# Patient Record
Sex: FEMALE | Race: WHITE | NOT HISPANIC OR LATINO | Employment: OTHER | URBAN - NONMETROPOLITAN AREA
[De-identification: names, ages, dates, MRNs, and addresses within clinical notes are randomized per-mention and may not be internally consistent; named-entity substitution may affect disease eponyms.]

---

## 2017-01-04 DIAGNOSIS — J44.1 COPD EXACERBATION (H): Primary | ICD-10-CM

## 2017-01-04 RX ORDER — PREDNISONE 10 MG/1
TABLET ORAL
Qty: 18 TABLET | Refills: 0 | Status: SHIPPED | OUTPATIENT
Start: 2017-01-04 | End: 2017-01-19

## 2017-01-08 DIAGNOSIS — I25.810 CORONARY ARTERY DISEASE INVOLVING CORONARY BYPASS GRAFT OF NATIVE HEART WITHOUT ANGINA PECTORIS: Primary | ICD-10-CM

## 2017-01-10 RX ORDER — CLOPIDOGREL BISULFATE 75 MG/1
TABLET ORAL
Qty: 90 TABLET | Refills: 0 | Status: SHIPPED | OUTPATIENT
Start: 2017-01-10 | End: 2017-04-04

## 2017-01-10 NOTE — TELEPHONE ENCOUNTER
Last visit: 9.22.15 - Overdue on follow up. Please advise on refills. Thank you  Last refill: 10.12.16 #90

## 2017-01-19 DIAGNOSIS — J44.1 COPD EXACERBATION (H): Primary | ICD-10-CM

## 2017-01-23 RX ORDER — PREDNISONE 10 MG/1
TABLET ORAL
Qty: 18 TABLET | Refills: 0 | Status: SHIPPED | OUTPATIENT
Start: 2017-01-23 | End: 2017-02-12

## 2017-01-25 ENCOUNTER — OFFICE VISIT (OUTPATIENT)
Dept: FAMILY MEDICINE | Facility: OTHER | Age: 72
End: 2017-01-25
Attending: FAMILY MEDICINE
Payer: COMMERCIAL

## 2017-01-25 VITALS
TEMPERATURE: 99.2 F | WEIGHT: 123.6 LBS | HEIGHT: 62 IN | HEART RATE: 93 BPM | OXYGEN SATURATION: 90 % | RESPIRATION RATE: 18 BRPM | SYSTOLIC BLOOD PRESSURE: 160 MMHG | DIASTOLIC BLOOD PRESSURE: 60 MMHG | BODY MASS INDEX: 22.74 KG/M2

## 2017-01-25 DIAGNOSIS — J44.0 CHRONIC OBSTRUCTIVE PULMONARY DISEASE WITH ACUTE LOWER RESPIRATORY INFECTION (H): ICD-10-CM

## 2017-01-25 DIAGNOSIS — J43.1 PANLOBULAR EMPHYSEMA (H): ICD-10-CM

## 2017-01-25 DIAGNOSIS — R05.9 COUGH: Primary | ICD-10-CM

## 2017-01-25 PROBLEM — Z71.89 ACP (ADVANCE CARE PLANNING): Chronic | Status: ACTIVE | Noted: 2017-01-25

## 2017-01-25 PROCEDURE — 99214 OFFICE O/P EST MOD 30 MIN: CPT | Performed by: FAMILY MEDICINE

## 2017-01-25 PROCEDURE — 71020 ZZHC CHEST TWO VIEWS, FRONT/LAT: CPT | Mod: TC | Performed by: RADIOLOGY

## 2017-01-25 RX ORDER — IPRATROPIUM BROMIDE AND ALBUTEROL SULFATE 2.5; .5 MG/3ML; MG/3ML
1 SOLUTION RESPIRATORY (INHALATION) EVERY 6 HOURS PRN
Qty: 90 VIAL | Refills: 3 | Status: SHIPPED | OUTPATIENT
Start: 2017-01-25 | End: 2017-06-07

## 2017-01-25 ASSESSMENT — ANXIETY QUESTIONNAIRES
GAD7 TOTAL SCORE: 0
3. WORRYING TOO MUCH ABOUT DIFFERENT THINGS: NOT AT ALL
IF YOU CHECKED OFF ANY PROBLEMS ON THIS QUESTIONNAIRE, HOW DIFFICULT HAVE THESE PROBLEMS MADE IT FOR YOU TO DO YOUR WORK, TAKE CARE OF THINGS AT HOME, OR GET ALONG WITH OTHER PEOPLE: NOT DIFFICULT AT ALL
7. FEELING AFRAID AS IF SOMETHING AWFUL MIGHT HAPPEN: NOT AT ALL
5. BEING SO RESTLESS THAT IT IS HARD TO SIT STILL: NOT AT ALL
1. FEELING NERVOUS, ANXIOUS, OR ON EDGE: NOT AT ALL
6. BECOMING EASILY ANNOYED OR IRRITABLE: NOT AT ALL
2. NOT BEING ABLE TO STOP OR CONTROL WORRYING: NOT AT ALL

## 2017-01-25 ASSESSMENT — PAIN SCALES - GENERAL: PAINLEVEL: NO PAIN (0)

## 2017-01-25 ASSESSMENT — PATIENT HEALTH QUESTIONNAIRE - PHQ9: 5. POOR APPETITE OR OVEREATING: NOT AT ALL

## 2017-01-25 NOTE — MR AVS SNAPSHOT
"              After Visit Summary   1/25/2017    Sheila Nuñez    MRN: [id number]           Patient Information     Date Of Birth          1945        Visit Information        Provider Department      1/25/2017 3:20 PM David [facility]        Today's Diagnoses     Cough    -  1     Chronic obstructive pulmonary disease with acute lower respiratory infection (H)         Panlobular emphysema (H)           Care Instructions    Take amoxicillin-clavulinic acid (Augmentin) twice daily        Follow-ups after your visit        Follow-up notes from your care team     Return in about 2 weeks (around 2/8/2017) for Follow Up Visit.      Who to contact     If you have questions or need follow up information about today's clinic visit or your schedule please contact [facility] directly at [phone].  Normal or non-critical lab and imaging results will be communicated to you by MyChart, letter or phone within 4 business days after the clinic has received the results. If you do not hear from us within 7 days, please contact the clinic through MyChart or phone. If you have a critical or abnormal lab result, we will notify you by phone as soon as possible.  Submit refill requests through [employer] or call your pharmacy and they will forward the refill request to us. Please allow 3 business days for your refill to be completed.          Additional Information About Your Visit        [employer] Information     [employer] lets you send messages to your doctor, view your test results, renew your prescriptions, schedule appointments and more. To sign up, go to [email or web address] . Click on \"Log in\" on the left side of the screen, which will take you to the Welcome page. Then click on \"Sign up Now\" on the right side of the page.     You will be asked to enter the access code listed below, as well as some personal information. Please follow the directions to create your username and " "password.     Your access code is: [email or web address]  Expires: 2017 10:16 AM     Your access code will  in 90 days. If you need help or a new code, please call your [place] clinic or [phone].        Care EveryWhere ID     This is your Care EveryWhere ID. This could be used by other organizations to access your [place] medical records  [id number]        Your Vitals Were     Pulse Temperature Respirations Height BMI (Body Mass Index) Pulse Oximetry    93 99.2  F (37.3  C) (Tympanic) 18 5' 2\" (1.575 m) 22.60 kg/m2 90%       Blood Pressure from Last 3 Encounters:   17 160/60   09/22/15 130/66   09/03/15 160/67    Weight from Last 3 Encounters:   17 123 lb 9.6 oz (56.065 kg)   09/22/15 125 lb (56.7 kg)   09/02/15 129 lb 6.6 oz (58.7 kg)              We Performed the Following     XR CHEST 2 VW (Clinic Performed)          Today's Medication Changes          These changes are accurate as of: 17 11:59 PM.  If you have any questions, ask your nurse or doctor.               Start taking these medicines.        Dose/Directions    amoxicillin-clavulanate 875-125 MG per tablet   Commonly known as:  AUGMENTIN   Used for:  Chronic obstructive pulmonary disease with acute lower respiratory infection (H)   Started by:  David Littlejohn MD        Dose:  1 tablet   Take 1 tablet by mouth 2 times daily   Quantity:  20 tablet   Refills:  0         These medicines have changed or have updated prescriptions.        Dose/Directions    * ipratropium - albuterol 0.5 mg/2.5 mg/3 mL 0.5-2.5 (3) MG/3ML neb solution   Commonly known as:  DUONEB   This may have changed:  Another medication with the same name was added. Make sure you understand how and when to take each.   Used for:  COPD (chronic obstructive pulmonary disease) (H)   Changed by:  David Littlejohn MD        Dose:  1 vial   Take 1 vial (3 mLs) by nebulization every 6 hours as needed for shortness of breath / dyspnea or wheezing   Quantity:  90 vial "   Refills:  1       * COMBIVENT RESPIMAT  MCG/ACT inhaler   This may have changed:  Another medication with the same name was added. Make sure you understand how and when to take each.   Used for:  COPD exacerbation (H)   Generic drug:  Ipratropium-Albuterol        Dose:  2 puff   Inhale 2 puffs into the lungs 4 times daily as needed for shortness of breath / dyspnea or wheezing Not to exceed 6 doses per day.   Quantity:  3 Inhaler   Refills:  1       * ipratropium - albuterol 0.5 mg/2.5 mg/3 mL 0.5-2.5 (3) MG/3ML neb solution   Commonly known as:  DUONEB   This may have changed:  You were already taking a medication with the same name, and this prescription was added. Make sure you understand how and when to take each.   Used for:  Chronic obstructive pulmonary disease with acute lower respiratory infection (H)   Changed by:  David Littlejohn MD        Dose:  1 vial   Take 1 vial (3 mLs) by nebulization every 6 hours as needed for shortness of breath / dyspnea or wheezing   Quantity:  90 vial   Refills:  3       lovastatin 20 MG tablet   Commonly known as:  MEVACOR   This may have changed:  Another medication with the same name was removed. Continue taking this medication, and follow the directions you see here.   Used for:  Mixed hyperlipidemia   Changed by:  David Littlejohn MD        TAKE ONE TABLET BY MOUTH ONCE DAILY   Quantity:  90 tablet   Refills:  0       * Notice:  This list has 3 medication(s) that are the same as other medications prescribed for you. Read the directions carefully, and ask your doctor or other care provider to review them with you.      Stop taking these medicines if you haven't already. Please contact your care team if you have questions.     azithromycin 250 MG tablet   Commonly known as:  ZITHROMAX   Stopped by:  David Littlejohn MD           nicotine 21 MG/24HR 24 hr patch   Commonly known as:  NICODERM CQ   Stopped by:  David Littlejohn MD                Where to get your  medicines      These medications were sent to [id number] - [place], [place] - [address]   [address] , [place] [address]     Phone:  [phone]    - amoxicillin-clavulanate 875-125 MG per tablet  - ipratropium - albuterol 0.5 mg/2.5 mg/3 mL 0.5-2.5 (3) MG/3ML neb solution             Primary Care Provider Office Phone # Fax #    David Littlejohn [phone]       [facility] [address]  [place] [address]        Thank you!     Thank you for choosing [facility]  for your care. Our goal is always to provide you with excellent care. Hearing back from our patients is one way we can continue to improve our services. Please take a few minutes to complete the written survey that you may receive in the mail after your visit with us. Thank you!             Your Updated Medication List - Protect others around you: Learn how to safely use, store and throw away your medicines at www.disposemymeds.org.          This list is accurate as of: 1/25/17 11:59 PM.  Always use your most recent med list.                   Brand Name Dispense Instructions for use    amoxicillin-clavulanate 875-125 MG per tablet    AUGMENTIN    20 tablet    Take 1 tablet by mouth 2 times daily       clopidogrel 75 MG tablet    PLAVIX    90 tablet    TAKE ONE TABLET BY MOUTH ONCE DAILY       FLAXSEED      2,000 mg 2 times daily       * ipratropium - albuterol 0.5 mg/2.5 mg/3 mL 0.5-2.5 (3) MG/3ML neb solution    DUONEB    90 vial    Take 1 vial (3 mLs) by nebulization every 6 hours as needed for shortness of breath / dyspnea or wheezing       * COMBIVENT RESPIMAT  MCG/ACT inhaler   Generic drug:  Ipratropium-Albuterol     3 Inhaler    Inhale 2 puffs into the lungs 4 times daily as needed for shortness of breath / dyspnea or wheezing Not to exceed 6 doses per day.       * ipratropium - albuterol 0.5 mg/2.5 mg/3 mL 0.5-2.5 (3) MG/3ML neb solution    DUONEB    90 vial    Take 1 vial (3 mLs) by  nebulization every 6 hours as needed for shortness of breath / dyspnea or wheezing       lovastatin 20 MG tablet    MEVACOR    90 tablet    TAKE ONE TABLET BY MOUTH ONCE DAILY       metoprolol 50 MG tablet    LOPRESSOR    60 tablet    TAKE ONE TABLET BY MOUTH TWICE DAILY WITH MEALS       predniSONE 10 MG tablet    DELTASONE    18 tablet    TAKE THREE TABLETS BY MOUTH ONCE DAILY FOR 3 DAYS TWO ONCE DAILY FOR 3 DAYS ONE ONCE DAILY FOR 3 DAYS WITH FOOD THEN  STOP       * Notice:  This list has 3 medication(s) that are the same as other medications prescribed for you. Read the directions carefully, and ask your doctor or other care provider to review them with you.

## 2017-01-25 NOTE — NURSING NOTE
"Chief Complaint   Patient presents with     URI     has a non productive cough with chills, fatigue and low grade temp,  x one month     COPD     f/u       Initial /60 mmHg  Pulse 93  Temp(Src) 99.2  F (37.3  C) (Tympanic)  Resp 18  Ht 5' 2\" (1.575 m)  Wt 123 lb 9.6 oz (56.065 kg)  BMI 22.60 kg/m2  SpO2 90% Estimated body mass index is 22.6 kg/(m^2) as calculated from the following:    Height as of this encounter: 5' 2\" (1.575 m).    Weight as of this encounter: 123 lb 9.6 oz (56.065 kg).  BP completed using cuff size: aramis Montenegro      "

## 2017-01-26 ASSESSMENT — ANXIETY QUESTIONNAIRES: GAD7 TOTAL SCORE: 0

## 2017-01-26 ASSESSMENT — PATIENT HEALTH QUESTIONNAIRE - PHQ9: SUM OF ALL RESPONSES TO PHQ QUESTIONS 1-9: 0

## 2017-01-29 NOTE — PROGRESS NOTES
SUBJECTIVE:  Sheila Nuñez, 71 year old, female is seen with upper respiratory congestion and cough.  Present over the last weeks.  She now has developed progressive dyspnea.  Sheila was retarted on prednisone and her symptoms persist.    Denies chest pain, dizzyness, nausea, vomiting, diaphoresis, palpitations, numbness, tingling, or paresthesias.      Current Outpatient Prescriptions   Medication Sig Dispense Refill     amoxicillin-clavulanate (AUGMENTIN) 875-125 MG per tablet Take 1 tablet by mouth 2 times daily 20 tablet 0     ipratropium - albuterol 0.5 mg/2.5 mg/3 mL (DUONEB) 0.5-2.5 (3) MG/3ML neb solution Take 1 vial (3 mLs) by nebulization every 6 hours as needed for shortness of breath / dyspnea or wheezing 90 vial 3     clopidogrel (PLAVIX) 75 MG tablet TAKE ONE TABLET BY MOUTH ONCE DAILY 90 tablet 0     metoprolol (LOPRESSOR) 50 MG tablet TAKE ONE TABLET BY MOUTH TWICE DAILY WITH MEALS 60 tablet 0     lovastatin (MEVACOR) 20 MG tablet TAKE ONE TABLET BY MOUTH ONCE DAILY 90 tablet 0     Ipratropium-Albuterol (COMBIVENT RESPIMAT)  MCG/ACT inhaler Inhale 2 puffs into the lungs 4 times daily as needed for shortness of breath / dyspnea or wheezing Not to exceed 6 doses per day. 3 Inhaler 1     ipratropium - albuterol 0.5 mg/2.5 mg/3 mL (DUONEB) 0.5-2.5 (3) MG/3ML nebulization Take 1 vial (3 mLs) by nebulization every 6 hours as needed for shortness of breath / dyspnea or wheezing 90 vial 1     FLAXSEED 2,000 mg 2 times daily        predniSONE (DELTASONE) 10 MG tablet TAKE THREE TABLETS BY MOUTH ONCE DAILY FOR 3 DAYS TWO ONCE DAILY FOR 3 DAYS ONE ONCE DAILY FOR 3 DAYS WITH FOOD THEN  STOP 18 tablet 0        Allergies   Allergen Reactions     Ciprofloxacin Other (See Comments)     Cipro  intolerance     Ciprofloxacin Hcl Other (See Comments)     Cipro  intolerance     Influenza Vaccines      Influenza virus vacc. Specific       Morphine Other (See Comments)     Becomes very mean     Nitrofurantoin  Other (See Comments)     Macrobid  intolerance     Nitrofurantoin Macrocrystal Other (See Comments)     Macrobid  Intolerance         Past Medical History   Diagnosis Date     Dyslipidemia 10/06/2011     CHD (coronary heart disease) 7/27/2011     Tobacco abuse 7/27/2011     Asthma 1/3/2012     Past Surgical History   Procedure Laterality Date     Stents       Angiogram       Family History   Problem Relation Age of Onset     C.A.D. Father 63     cause of death     C.A.D. Mother 78     cause of death     Social History     Social History     Marital Status:      Spouse Name: N/A     Number of Children: N/A     Years of Education: N/A     Occupational History     Not on file.     Social History Main Topics     Smoking status: Current Some Day Smoker -- 0.30 packs/day for 20 years     Types: Cigarettes     Last Attempt to Quit: 08/30/2015     Smokeless tobacco: Never Used      Comment: no passive smoke exposure     Alcohol Use: No     Drug Use: Not on file     Sexual Activity: Not on file     Other Topics Concern     Caffeine Concern Yes     coffee > 6 cups daily     Social History Narrative         Review Of Systems  Constitutional, HEENT, cardiovascular, pulmonary, gi and gu systems are negative, except as otherwise noted.    OBJECTIVE:  Vitals: B/P: 160/60, T: 99.2, P: 93, R: 18    Exam:  Physical Exam   Constitutional: She is oriented to person, place, and time. She appears well-developed and well-nourished. No distress.   HENT:   Head: Normocephalic.   Right Ear: Hearing, tympanic membrane, external ear and ear canal normal.   Left Ear: Hearing, tympanic membrane, external ear and ear canal normal.   Nose: Nose normal. Right sinus exhibits no maxillary sinus tenderness and no frontal sinus tenderness. Left sinus exhibits no maxillary sinus tenderness and no frontal sinus tenderness.   Mouth/Throat: Uvula is midline and oropharynx is clear and moist. No oropharyngeal exudate or posterior oropharyngeal  erythema.   Eyes: Conjunctivae are normal.   Neck: Neck supple.   Pulmonary/Chest: Effort normal. No respiratory distress. She has wheezes. She has rales.   Markedly diminished breath sounds and she is displaying pursed lip breathing.   Lymphadenopathy:     She has no cervical adenopathy.   Neurological: She is alert and oriented to person, place, and time.   Skin: Skin is warm and dry.   Psychiatric: She has a normal mood and affect.     Other exam not repeated    Labs:  Admission on 09/02/2015, Discharged on 09/03/2015   Component Date Value Ref Range Status     Sodium 09/02/2015 130* 133 - 144 mmol/L Final     Potassium 09/02/2015 3.4  3.4 - 5.3 mmol/L Final     Chloride 09/02/2015 96  94 - 109 mmol/L Final     Carbon Dioxide 09/02/2015 25  20 - 32 mmol/L Final     Anion Gap 09/02/2015 9  3 - 14 mmol/L Final     Glucose 09/02/2015 126* 70 - 99 mg/dL Final     Urea Nitrogen 09/02/2015 10  7 - 30 mg/dL Final     Creatinine 09/02/2015 0.62  0.52 - 1.04 mg/dL Final     GFR Estimate 09/02/2015   >60 mL/min/1.7m2 Final                    Value:>90  Non  GFR Calc       GFR Estimate If Black 09/02/2015   >60 mL/min/1.7m2 Final                    Value:>90   GFR Calc       Calcium 09/02/2015 8.9  8.5 - 10.1 mg/dL Final     Bilirubin Total 09/02/2015 1.0  0.2 - 1.3 mg/dL Final     Albumin 09/02/2015 3.3* 3.4 - 5.0 g/dL Final     Protein Total 09/02/2015 8.1  6.8 - 8.8 g/dL Final     Alkaline Phosphatase 09/02/2015 119  40 - 150 U/L Final     ALT 09/02/2015 14  0 - 50 U/L Final     AST 09/02/2015 12  0 - 45 U/L Final     WBC 09/02/2015 15.1* 4.0 - 11.0 10e9/L Final     RBC Count 09/02/2015 4.84  3.8 - 5.2 10e12/L Final     Hemoglobin 09/02/2015 14.7  11.7 - 15.7 g/dL Final     Hematocrit 09/02/2015 42.0  35.0 - 47.0 % Final     MCV 09/02/2015 87  78 - 100 fl Final     MCH 09/02/2015 30.4  26.5 - 33.0 pg Final     MCHC 09/02/2015 35.0  31.5 - 36.5 g/dL Final     RDW 09/02/2015 13.0  10.0 -  15.0 % Final     Platelet Count 09/02/2015 182  150 - 450 10e9/L Final     Diff Method 09/02/2015 Automated Method   Final     % Neutrophils 09/02/2015 76.7   Final     % Lymphocytes 09/02/2015 9.0   Final     % Monocytes 09/02/2015 13.6   Final     % Eosinophils 09/02/2015 0.1   Final     % Basophils 09/02/2015 0.3   Final     % Immature Granulocytes 09/02/2015 0.3   Final     Absolute Neutrophil 09/02/2015 11.6* 1.6 - 8.3 10e9/L Final     Absolute Lymphocytes 09/02/2015 1.4  0.8 - 5.3 10e9/L Final     Absolute Monocytes 09/02/2015 2.1* 0.0 - 1.3 10e9/L Final     Absolute Eosinophils 09/02/2015 0.0  0.0 - 0.7 10e9/L Final     Absolute Basophils 09/02/2015 0.0  0.0 - 0.2 10e9/L Final     Abs Immature Granulocytes 09/02/2015 0.0  0 - 0.4 10e9/L Final     Lactic Acid 09/02/2015 1.0  0.4 - 2.0 mmol/L Final     Specimen Description 09/02/2015 Blood   Final     Special Requests 09/02/2015 Left Arm   Final     Culture Micro 09/02/2015 No growth after 6 days   Final     Micro Report Status 09/02/2015 FINAL 09/08/2015   Final     Specimen Description 09/02/2015 Blood   Final     Special Requests 09/02/2015 Right Arm   Final     Culture Micro 09/02/2015 No growth after 6 days   Final     Micro Report Status 09/02/2015 FINAL 09/08/2015   Final     Troponin I ES 09/02/2015   0.000 - 0.045 ug/L Final                    Value:<0.015  The 99th percentile for upper reference range is 0.045 ug/L.  Troponin values in   the range of 0.045 - 0.120 ug/L may be associated with risks of adverse   clinical events.       Magnesium 09/02/2015 1.9  1.6 - 2.3 mg/dL Final     Specimen Description 09/02/2015 Swab   Final     Culture Micro 09/02/2015 No MRSA isolated   Final     Micro Report Status 09/02/2015 FINAL 09/04/2015   Final     Sodium 09/03/2015 140  133 - 144 mmol/L Final     Potassium 09/03/2015 3.8  3.4 - 5.3 mmol/L Final     Chloride 09/03/2015 107  94 - 109 mmol/L Final     Carbon Dioxide 09/03/2015 24  20 - 32 mmol/L Final      Anion Gap 09/03/2015 9  3 - 14 mmol/L Final     Glucose 09/03/2015 126* 70 - 99 mg/dL Final     Urea Nitrogen 09/03/2015 10  7 - 30 mg/dL Final     Creatinine 09/03/2015 0.54  0.52 - 1.04 mg/dL Final     GFR Estimate 09/03/2015   >60 mL/min/1.7m2 Final                    Value:>90  Non  GFR Calc       GFR Estimate If Black 09/03/2015   >60 mL/min/1.7m2 Final                    Value:>90   GFR Calc       Calcium 09/03/2015 9.0  8.5 - 10.1 mg/dL Final     WBC 09/03/2015 12.3* 4.0 - 11.0 10e9/L Final     RBC Count 09/03/2015 4.48  3.8 - 5.2 10e12/L Final     Hemoglobin 09/03/2015 13.7  11.7 - 15.7 g/dL Final     Hematocrit 09/03/2015 39.7  35.0 - 47.0 % Final     MCV 09/03/2015 89  78 - 100 fl Final     MCH 09/03/2015 30.6  26.5 - 33.0 pg Final     MCHC 09/03/2015 34.5  31.5 - 36.5 g/dL Final     RDW 09/03/2015 13.1  10.0 - 15.0 % Final     Platelet Count 09/03/2015 201  150 - 450 10e9/L Final     Diff Method 09/03/2015 Automated Method   Final     % Neutrophils 09/03/2015 84.9   Final     % Lymphocytes 09/03/2015 8.4   Final     % Monocytes 09/03/2015 6.3   Final     % Eosinophils 09/03/2015 0.0   Final     % Basophils 09/03/2015 0.1   Final     % Immature Granulocytes 09/03/2015 0.3   Final     Absolute Neutrophil 09/03/2015 10.5* 1.6 - 8.3 10e9/L Final     Absolute Lymphocytes 09/03/2015 1.0  0.8 - 5.3 10e9/L Final     Absolute Monocytes 09/03/2015 0.8  0.0 - 1.3 10e9/L Final     Absolute Eosinophils 09/03/2015 0.0  0.0 - 0.7 10e9/L Final     Absolute Basophils 09/03/2015 0.0  0.0 - 0.2 10e9/L Final     Abs Immature Granulocytes 09/03/2015 0.0  0 - 0.4 10e9/L Final     Magnesium 09/03/2015 2.1  1.6 - 2.3 mg/dL Final       ASSESSMENT/PLAN:  Cough  Chest xray show small right lower lobe infiltrate.  - XR CHEST 2 VW (Clinic Performed)    Chronic obstructive pulmonary disease with acute lower respiratory infection (H)  Augmentin twice daily.   Continue prednisone and home nebs  -  amoxicillin-clavulanate (AUGMENTIN) 875-125 MG per tablet; Take 1 tablet by mouth 2 times daily  - ipratropium - albuterol 0.5 mg/2.5 mg/3 mL (DUONEB) 0.5-2.5 (3) MG/3ML neb solution; Take 1 vial (3 mLs) by nebulization every 6 hours as needed for shortness of breath / dyspnea or wheezing            David Littlejohn MD

## 2017-02-12 DIAGNOSIS — J44.1 COPD EXACERBATION (H): ICD-10-CM

## 2017-02-13 RX ORDER — PREDNISONE 10 MG/1
TABLET ORAL
Qty: 18 TABLET | Refills: 0 | Status: SHIPPED | OUTPATIENT
Start: 2017-02-13 | End: 2017-03-06

## 2017-03-06 DIAGNOSIS — J44.1 COPD EXACERBATION (H): ICD-10-CM

## 2017-03-06 RX ORDER — PREDNISONE 10 MG/1
TABLET ORAL
Qty: 18 TABLET | Refills: 0 | Status: SHIPPED | OUTPATIENT
Start: 2017-03-06 | End: 2017-03-27

## 2017-03-06 NOTE — TELEPHONE ENCOUNTER
Prednisone      Last Written Prescription Date: 2/13/17  Last Fill Quantity: 18,  # refills: 0   Last Office Visit with [facility], P or [facility] prescribing provider: 1/25/17

## 2017-03-27 DIAGNOSIS — J44.1 COPD EXACERBATION (H): ICD-10-CM

## 2017-03-27 DIAGNOSIS — I10 HTN (HYPERTENSION): ICD-10-CM

## 2017-03-29 RX ORDER — METOPROLOL TARTRATE 50 MG
TABLET ORAL
Qty: 60 TABLET | Refills: 0 | Status: SHIPPED | OUTPATIENT
Start: 2017-03-29 | End: 2017-04-27

## 2017-03-29 RX ORDER — PREDNISONE 10 MG/1
TABLET ORAL
Qty: 18 TABLET | Refills: 0 | Status: SHIPPED | OUTPATIENT
Start: 2017-03-29 | End: 2017-04-27

## 2017-04-04 DIAGNOSIS — I25.810 CORONARY ARTERY DISEASE INVOLVING CORONARY BYPASS GRAFT OF NATIVE HEART WITHOUT ANGINA PECTORIS: ICD-10-CM

## 2017-04-06 RX ORDER — CLOPIDOGREL BISULFATE 75 MG/1
TABLET ORAL
Qty: 90 TABLET | Refills: 0 | Status: SHIPPED | OUTPATIENT
Start: 2017-04-06 | End: 2017-07-15

## 2017-04-06 NOTE — TELEPHONE ENCOUNTER
Clopidogrel 75mg           Last Written Prescription Date: 1-  Last Fill Quantity: 90, # refills: 0    Last Office Visit with [facility], [facility] or [facility] prescribing provider:  1-   Future Office Visit:       Lab Results   Component Value Date    WBC 12.3 09/03/2015     Lab Results   Component Value Date    RBC 4.48 09/03/2015     Lab Results   Component Value Date    HGB 13.7 09/03/2015     Lab Results   Component Value Date    HCT 39.7 09/03/2015     No components found for: MCT  Lab Results   Component Value Date    MCV 89 09/03/2015     Lab Results   Component Value Date    MCH 30.6 09/03/2015     Lab Results   Component Value Date    MCHC 34.5 09/03/2015     Lab Results   Component Value Date    RDW 13.1 09/03/2015     Lab Results   Component Value Date     09/03/2015     Lab Results   Component Value Date    AST 12 09/02/2015     Lab Results   Component Value Date    ALT 14 09/02/2015     Creatinine   Date Value Ref Range Status   09/03/2015 0.54 0.52 - 1.04 mg/dL Final   ]

## 2017-04-27 DIAGNOSIS — I10 HTN (HYPERTENSION): ICD-10-CM

## 2017-04-27 DIAGNOSIS — J44.1 COPD EXACERBATION (H): ICD-10-CM

## 2017-04-27 NOTE — TELEPHONE ENCOUNTER
metoprolol      Last Written Prescription Date: 3/29/17  Last Fill Quantity: 60, # refills: 0  Last Office Visit with [facility], [facility] or [facility] prescribing provider: 1/25/17       Potassium   Date Value Ref Range Status   09/03/2015 3.8 3.4 - 5.3 mmol/L Final     Creatinine   Date Value Ref Range Status   09/03/2015 0.54 0.52 - 1.04 mg/dL Final     BP Readings from Last 3 Encounters:   01/25/17 160/60   09/22/15 130/66   09/03/15 160/67     prednisone      Last Written Prescription Date: 3/29/17  Last Fill Quantity: 18,  # refills: 0   Last Office Visit with [facility], [facility] or [facility] prescribing provider: 1/25/17

## 2017-04-28 RX ORDER — PREDNISONE 10 MG/1
TABLET ORAL
Qty: 18 TABLET | Refills: 0 | Status: SHIPPED | OUTPATIENT
Start: 2017-04-28 | End: 2017-06-01

## 2017-04-28 RX ORDER — METOPROLOL TARTRATE 50 MG
TABLET ORAL
Qty: 60 TABLET | Refills: 8 | Status: ON HOLD | OUTPATIENT
Start: 2017-04-28 | End: 2017-06-07

## 2017-06-01 DIAGNOSIS — E78.2 MIXED HYPERLIPIDEMIA: ICD-10-CM

## 2017-06-01 DIAGNOSIS — J44.1 COPD EXACERBATION (H): ICD-10-CM

## 2017-06-02 RX ORDER — LOVASTATIN 20 MG
TABLET ORAL
Qty: 90 TABLET | Refills: 1 | Status: SHIPPED | OUTPATIENT
Start: 2017-06-02 | End: 2017-12-09

## 2017-06-02 RX ORDER — PREDNISONE 10 MG/1
TABLET ORAL
Qty: 18 TABLET | Refills: 0 | Status: ON HOLD | OUTPATIENT
Start: 2017-06-02 | End: 2017-06-08

## 2017-06-02 NOTE — TELEPHONE ENCOUNTER
predniSONE (DELTASONE) 10 MG tablet    Last Written Prescription Date: 04/28/2017  Last Fill Quantity: 18,  # refills: 0   Last Office Visit with [facility], [facility] or [facility] prescribing provider: 01/25/2017

## 2017-06-07 ENCOUNTER — HOSPITAL ENCOUNTER (INPATIENT)
Facility: HOSPITAL | Age: 72
LOS: 1 days | Discharge: HOME | DRG: 190 | End: 2017-06-08
Attending: FAMILY MEDICINE | Admitting: INTERNAL MEDICINE
Payer: COMMERCIAL

## 2017-06-07 ENCOUNTER — TELEPHONE (OUTPATIENT)
Dept: FAMILY MEDICINE | Facility: OTHER | Age: 72
End: 2017-06-07

## 2017-06-07 DIAGNOSIS — J44.1 COPD EXACERBATION (H): ICD-10-CM

## 2017-06-07 DIAGNOSIS — I25.10 CORONARY ARTERY DISEASE INVOLVING NATIVE CORONARY ARTERY OF NATIVE HEART WITHOUT ANGINA PECTORIS: Primary | ICD-10-CM

## 2017-06-07 LAB
ANION GAP SERPL CALCULATED.3IONS-SCNC: 9 MMOL/L (ref 3–14)
BASOPHILS # BLD AUTO: 0 10E9/L (ref 0–0.2)
BASOPHILS NFR BLD AUTO: 0.3 %
BUN SERPL-MCNC: 11 MG/DL (ref 7–30)
CALCIUM SERPL-MCNC: 8.7 MG/DL (ref 8.5–10.1)
CHLORIDE SERPL-SCNC: 99 MMOL/L (ref 94–109)
CO2 SERPL-SCNC: 25 MMOL/L (ref 20–32)
CREAT SERPL-MCNC: 0.71 MG/DL (ref 0.52–1.04)
DIFFERENTIAL METHOD BLD: ABNORMAL
EOSINOPHIL # BLD AUTO: 0 10E9/L (ref 0–0.7)
EOSINOPHIL NFR BLD AUTO: 0 %
ERYTHROCYTE [DISTWIDTH] IN BLOOD BY AUTOMATED COUNT: 12.6 % (ref 10–15)
GFR SERPL CREATININE-BSD FRML MDRD: 81 ML/MIN/1.7M2
GLUCOSE SERPL-MCNC: 124 MG/DL (ref 70–99)
HCT VFR BLD AUTO: 42.1 % (ref 35–47)
HGB BLD-MCNC: 15.2 G/DL (ref 11.7–15.7)
IMM GRANULOCYTES # BLD: 0 10E9/L (ref 0–0.4)
IMM GRANULOCYTES NFR BLD: 0.2 %
LYMPHOCYTES # BLD AUTO: 1 10E9/L (ref 0.8–5.3)
LYMPHOCYTES NFR BLD AUTO: 7.6 %
MCH RBC QN AUTO: 31.5 PG (ref 26.5–33)
MCHC RBC AUTO-ENTMCNC: 36.1 G/DL (ref 31.5–36.5)
MCV RBC AUTO: 87 FL (ref 78–100)
MONOCYTES # BLD AUTO: 1 10E9/L (ref 0–1.3)
MONOCYTES NFR BLD AUTO: 7.8 %
NEUTROPHILS # BLD AUTO: 10.5 10E9/L (ref 1.6–8.3)
NEUTROPHILS NFR BLD AUTO: 84.1 %
NRBC # BLD AUTO: 0 10*3/UL
NRBC BLD AUTO-RTO: 0 /100
PLATELET # BLD AUTO: 209 10E9/L (ref 150–450)
POTASSIUM SERPL-SCNC: 3.5 MMOL/L (ref 3.4–5.3)
RBC # BLD AUTO: 4.82 10E12/L (ref 3.8–5.2)
SODIUM SERPL-SCNC: 133 MMOL/L (ref 133–144)
WBC # BLD AUTO: 12.5 10E9/L (ref 4–11)

## 2017-06-07 PROCEDURE — 25000125 ZZHC RX 250: Performed by: INTERNAL MEDICINE

## 2017-06-07 PROCEDURE — 99223 1ST HOSP IP/OBS HIGH 75: CPT | Performed by: INTERNAL MEDICINE

## 2017-06-07 PROCEDURE — 94640 AIRWAY INHALATION TREATMENT: CPT

## 2017-06-07 PROCEDURE — 93010 ELECTROCARDIOGRAM REPORT: CPT | Performed by: INTERNAL MEDICINE

## 2017-06-07 PROCEDURE — 25000128 H RX IP 250 OP 636: Performed by: FAMILY MEDICINE

## 2017-06-07 PROCEDURE — 96375 TX/PRO/DX INJ NEW DRUG ADDON: CPT

## 2017-06-07 PROCEDURE — 94640 AIRWAY INHALATION TREATMENT: CPT | Mod: 76

## 2017-06-07 PROCEDURE — 40000786 ZZHCL STATISTIC ACTIVE MRSA SURVEILLANCE CULTURE: Performed by: INTERNAL MEDICINE

## 2017-06-07 PROCEDURE — 80048 BASIC METABOLIC PNL TOTAL CA: CPT | Performed by: FAMILY MEDICINE

## 2017-06-07 PROCEDURE — 12000000 ZZH R&B MED SURG/OB

## 2017-06-07 PROCEDURE — 40000275 ZZH STATISTIC RCP TIME EA 10 MIN

## 2017-06-07 PROCEDURE — 85025 COMPLETE CBC W/AUTO DIFF WBC: CPT | Performed by: FAMILY MEDICINE

## 2017-06-07 PROCEDURE — 25000128 H RX IP 250 OP 636: Performed by: INTERNAL MEDICINE

## 2017-06-07 PROCEDURE — 96367 TX/PROPH/DG ADDL SEQ IV INF: CPT

## 2017-06-07 PROCEDURE — 96365 THER/PROPH/DIAG IV INF INIT: CPT

## 2017-06-07 PROCEDURE — 25000132 ZZH RX MED GY IP 250 OP 250 PS 637: Performed by: INTERNAL MEDICINE

## 2017-06-07 PROCEDURE — 71020 ZZHC CHEST TWO VIEWS, FRONT/LAT: CPT | Mod: TC

## 2017-06-07 PROCEDURE — 25000125 ZZHC RX 250: Performed by: FAMILY MEDICINE

## 2017-06-07 PROCEDURE — 99285 EMERGENCY DEPT VISIT HI MDM: CPT | Mod: 25

## 2017-06-07 PROCEDURE — 93005 ELECTROCARDIOGRAM TRACING: CPT

## 2017-06-07 PROCEDURE — 99285 EMERGENCY DEPT VISIT HI MDM: CPT | Performed by: FAMILY MEDICINE

## 2017-06-07 RX ORDER — METOPROLOL TARTRATE 25 MG/1
25 TABLET, FILM COATED ORAL EVERY EVENING
Status: DISCONTINUED | OUTPATIENT
Start: 2017-06-07 | End: 2017-06-08 | Stop reason: HOSPADM

## 2017-06-07 RX ORDER — NALOXONE HYDROCHLORIDE 0.4 MG/ML
.1-.4 INJECTION, SOLUTION INTRAMUSCULAR; INTRAVENOUS; SUBCUTANEOUS
Status: DISCONTINUED | OUTPATIENT
Start: 2017-06-07 | End: 2017-06-08 | Stop reason: HOSPADM

## 2017-06-07 RX ORDER — ONDANSETRON 2 MG/ML
4 INJECTION INTRAMUSCULAR; INTRAVENOUS EVERY 6 HOURS PRN
Status: DISCONTINUED | OUTPATIENT
Start: 2017-06-07 | End: 2017-06-08 | Stop reason: HOSPADM

## 2017-06-07 RX ORDER — IPRATROPIUM BROMIDE AND ALBUTEROL SULFATE 2.5; .5 MG/3ML; MG/3ML
3 SOLUTION RESPIRATORY (INHALATION)
Status: COMPLETED | OUTPATIENT
Start: 2017-06-07 | End: 2017-06-07

## 2017-06-07 RX ORDER — ONDANSETRON 4 MG/1
4 TABLET, ORALLY DISINTEGRATING ORAL EVERY 6 HOURS PRN
Status: DISCONTINUED | OUTPATIENT
Start: 2017-06-07 | End: 2017-06-08 | Stop reason: HOSPADM

## 2017-06-07 RX ORDER — IPRATROPIUM BROMIDE AND ALBUTEROL SULFATE 2.5; .5 MG/3ML; MG/3ML
3 SOLUTION RESPIRATORY (INHALATION) EVERY 4 HOURS
Status: DISCONTINUED | OUTPATIENT
Start: 2017-06-07 | End: 2017-06-08

## 2017-06-07 RX ORDER — LEVOFLOXACIN 5 MG/ML
750 INJECTION, SOLUTION INTRAVENOUS ONCE
Status: COMPLETED | OUTPATIENT
Start: 2017-06-07 | End: 2017-06-07

## 2017-06-07 RX ORDER — ALBUTEROL SULFATE 0.83 MG/ML
3 SOLUTION RESPIRATORY (INHALATION)
Status: DISCONTINUED | OUTPATIENT
Start: 2017-06-07 | End: 2017-06-08 | Stop reason: HOSPADM

## 2017-06-07 RX ORDER — IPRATROPIUM BROMIDE AND ALBUTEROL SULFATE 2.5; .5 MG/3ML; MG/3ML
3 SOLUTION RESPIRATORY (INHALATION) ONCE
Status: COMPLETED | OUTPATIENT
Start: 2017-06-07 | End: 2017-06-07

## 2017-06-07 RX ORDER — ACETAMINOPHEN 325 MG/1
650 TABLET ORAL EVERY 4 HOURS PRN
Status: DISCONTINUED | OUTPATIENT
Start: 2017-06-07 | End: 2017-06-08 | Stop reason: HOSPADM

## 2017-06-07 RX ORDER — LEVOFLOXACIN 500 MG/1
500 TABLET, FILM COATED ORAL DAILY
Status: DISCONTINUED | OUTPATIENT
Start: 2017-06-08 | End: 2017-06-08 | Stop reason: HOSPADM

## 2017-06-07 RX ORDER — CLOPIDOGREL BISULFATE 75 MG/1
75 TABLET ORAL DAILY
Status: DISCONTINUED | OUTPATIENT
Start: 2017-06-08 | End: 2017-06-08 | Stop reason: HOSPADM

## 2017-06-07 RX ORDER — METOPROLOL TARTRATE 50 MG
50 TABLET ORAL EVERY MORNING
Status: DISCONTINUED | OUTPATIENT
Start: 2017-06-08 | End: 2017-06-08 | Stop reason: HOSPADM

## 2017-06-07 RX ORDER — METHYLPREDNISOLONE SODIUM SUCCINATE 125 MG/2ML
60 INJECTION, POWDER, LYOPHILIZED, FOR SOLUTION INTRAMUSCULAR; INTRAVENOUS DAILY
Status: DISCONTINUED | OUTPATIENT
Start: 2017-06-07 | End: 2017-06-08

## 2017-06-07 RX ORDER — METOPROLOL TARTRATE 50 MG
50 TABLET ORAL 2 TIMES DAILY
Status: DISCONTINUED | OUTPATIENT
Start: 2017-06-07 | End: 2017-06-07

## 2017-06-07 RX ORDER — SIMVASTATIN 10 MG
10 TABLET ORAL DAILY
Status: DISCONTINUED | OUTPATIENT
Start: 2017-06-07 | End: 2017-06-08 | Stop reason: HOSPADM

## 2017-06-07 RX ORDER — METHYLPREDNISOLONE SODIUM SUCCINATE 125 MG/2ML
125 INJECTION, POWDER, LYOPHILIZED, FOR SOLUTION INTRAMUSCULAR; INTRAVENOUS ONCE
Status: COMPLETED | OUTPATIENT
Start: 2017-06-07 | End: 2017-06-07

## 2017-06-07 RX ADMIN — IPRATROPIUM BROMIDE AND ALBUTEROL SULFATE 3 ML: .5; 3 SOLUTION RESPIRATORY (INHALATION) at 09:55

## 2017-06-07 RX ADMIN — TAZOBACTAM SODIUM AND PIPERACILLIN SODIUM 4.5 G: 500; 4 INJECTION, SOLUTION INTRAVENOUS at 14:07

## 2017-06-07 RX ADMIN — LEVOFLOXACIN 750 MG: 5 INJECTION, SOLUTION INTRAVENOUS at 14:59

## 2017-06-07 RX ADMIN — IPRATROPIUM BROMIDE AND ALBUTEROL SULFATE 3 ML: .5; 3 SOLUTION RESPIRATORY (INHALATION) at 14:37

## 2017-06-07 RX ADMIN — METHYLPREDNISOLONE SODIUM SUCCINATE 125 MG: 125 INJECTION, POWDER, FOR SOLUTION INTRAMUSCULAR; INTRAVENOUS at 10:14

## 2017-06-07 RX ADMIN — METOPROLOL TARTRATE 25 MG: 25 TABLET ORAL at 20:33

## 2017-06-07 RX ADMIN — ENOXAPARIN SODIUM 40 MG: 40 INJECTION SUBCUTANEOUS at 18:25

## 2017-06-07 RX ADMIN — IPRATROPIUM BROMIDE AND ALBUTEROL SULFATE 3 ML: .5; 3 SOLUTION RESPIRATORY (INHALATION) at 10:05

## 2017-06-07 RX ADMIN — SIMVASTATIN 10 MG: 10 TABLET, FILM COATED ORAL at 20:33

## 2017-06-07 RX ADMIN — IPRATROPIUM BROMIDE AND ALBUTEROL SULFATE 3 ML: .5; 3 SOLUTION RESPIRATORY (INHALATION) at 19:39

## 2017-06-07 RX ADMIN — IPRATROPIUM BROMIDE AND ALBUTEROL SULFATE 3 ML: .5; 3 SOLUTION RESPIRATORY (INHALATION) at 10:31

## 2017-06-07 ASSESSMENT — ENCOUNTER SYMPTOMS
MUSCULOSKELETAL NEGATIVE: 1
ACTIVITY CHANGE: 1
COUGH: 1
FEVER: 1
ABDOMINAL PAIN: 0
PALPITATIONS: 0
NEUROLOGICAL NEGATIVE: 1
NERVOUS/ANXIOUS: 1
SHORTNESS OF BREATH: 1
WHEEZING: 1
FATIGUE: 1

## 2017-06-07 NOTE — ED NOTES
"Presents to ER with c/o \"increasing shortness of breath,starting this am.\" Does have history of COPD.  Denies cough or any oter symptoms. See assessments. Call light placed within reach.   "

## 2017-06-07 NOTE — TELEPHONE ENCOUNTER
8:01 AM    Reason for Call: OVERBOOK    Patient is having the following symptoms: difficulty breathing for 5 days.    The patient is requesting an appointment for 6-7-17 with Dr Littlejohn.    Was an appointment offered for this call? Yes, patient would like sooner than next available    Preferred method for responding to this message: Telephone Call [phone]    If we cannot reach you directly, may we leave a detailed response at the number you provided? Yes    Can this message wait until your PCP/provider returns, if unavailable today? YES    Kyleigh Hi

## 2017-06-07 NOTE — IP AVS SNAPSHOT
MRN:[id number]                      After Visit Summary   6/7/2017    Sheila Nuñez    MRN: [id number]           Thank you!     Thank you for choosing [facility] for your care. Our goal is always to provide you with excellent care. Hearing back from our patients is one way we can continue to improve our services. Please take a few minutes to complete the written survey that you may receive in the mail after you visit with us. Thank you!        Patient Information     Date Of Birth          1945        Designated Caregiver       Most Recent Value    Caregiver    Will someone help with your care after discharge? no      About your hospital stay     You were admitted on:  June 7, 2017 You last received care in the:  [facility]    You were discharged on:  June 8, 2017        Reason for your hospital stay       Ms. Sheila Nuñez is a 71-year-old woman with history of COPD, clinically severe, coronary artery disease, dyslipidemia and chronic smoking who presented to the Emergency Department because of shortness of breath.  This morning her pulse oximetry at home showed 83%.  Her daughter insisted on her to come to the Emergency Department.  In the Emergency Department, the patient was found to have low sats and she had to be placed on oxygen.  By the next morning she felt markedly improved.  She was anxious to leave the hospital.  She reluctantly agreed to home oxygen.  Added to dorsa as a long-acting bronchodilator with inhaled corticosteroid.  Tapering dose of prednisone.                  Who to Call     For medical emergencies, please call 911.  For non-urgent questions about your medical care, please call your primary care provider or clinic, [phone]          Attending Provider     Provider Specialty    Sylvia Campbell MD Emergency Medicine    GharaibehVelvet MD Internal Medicine    Monty Delgadillo MD Internal Medicine       Primary Care  Provider Office Phone # Fax #    David Littlejohn [phone]      After Care Instructions     Activity       Your activity upon discharge: activity as tolerated            Diet       Follow this diet upon discharge: Orders Placed This Encounter      Combination Diet Regular Diet Adult            Oxygen Adult       [place] Oxygen Order 02 liter(s) by nasal cannula continuously with use of portable tank. Expected treatment length is indefinite (99 months).. Test on conserving device as applicable.    Patients who qualify for home O2 coverage under the CMS guidelines require ABG tests or O2 sat readings obtained THE DAY OF DISCHARGE WHILE IN STABLE CONDITION; 85 PERCENT ON ROOM AIR AT REST;  92 PERCENT, BREATHING 2 l OF OXYGEN VIA NASAL CANNULA  Testing must be performed while patient is in the chronic stable state. See notes for O2 sats.    I certify that this patient, Sheila Nuñez has been under my care and that I, or a nurse practitioner or physician's assistant working with me, had a face-to-face encounter that meets the face-to-face encounter requirements with this patient on 6/8/2017. The patient, Sheila Nuñez was evaluated or treated in whole, or in part, for the following medical condition, which necessitates the use of the ordered oxygen. Treatment Diagnosis: Severe COPD    Attending Provider: Monty Bailey  Physician signature: See electronic signature associated with these discharge orders  Date of Order: June 8, 2017                  Follow-up Appointments     Follow-up and recommended labs and tests        Follow up with primary care provider, David Littlejohn, within 7-14 days for hospital follow- up.                  Your next 10 appointments already scheduled     Jun 21, 2017 10:20 AM CDT   (Arrive by 10:05 AM)   SHORT with David Littlejohn MD   [facility] ([facility])    [address] [place] [address]   [phone]              Future  "tests that were ordered for you     General PFT Lab (Please always keep checked)           Pulmonary Function Test       ** Bronchodilators/neb treatments should be held 4-6 hours prior to receiving a bronchodilator study.    [facility] will distribute Patient Information Packet;     [facility] ([facility]) will contact patient by telephone    Please call the following departments if there are questions or need assistance:  [facility]: [phone]  [facility]: [phone]  [facility]: [phone]  [facility] Registered: [phone]  [facility]: [phone]  [facility]: [phone]                        Further instructions from your care team       You have a follow up appointment with Dr. Littlejohn on 6/21/2017 at 10:05 AM    Pending Results     Date and Time Order Name Status Description    6/7/2017 1658 Active MRSA Surveillance Culture Preliminary     6/7/2017 1019 EKG CARDIAC - HIM SCAN Preliminary             Statement of Approval     Ordered          06/08/17 1209  I have reviewed and agree with all the recommendations and orders detailed in this document.  EFFECTIVE NOW     Approved and electronically signed by:  Monty Delgadillo MD             Admission Information     Date & Time Provider Department Dept. Phone    6/7/2017 Monty Delgadillo MD [facility] [phone]      Your Vitals Were     Blood Pressure Pulse Temperature Respirations Height Weight    157/65 (BP Location: Left arm) 89 98  F (36.7  C) (Tympanic) 18 1.588 m (5' 2.5\") 55.2 kg (121 lb 11.1 oz)    Pulse Oximetry BMI (Body Mass Index)                92% 21.9 kg/m2          MyChart Information     [facility] lets you send messages to your doctor, view your test results, renew your prescriptions, schedule appointments and more. To sign up, go to [email or web address] . Click on \"Log in\" on the left side of " "the screen, which will take you to the Welcome page. Then click on \"Sign up Now\" on the right side of the page.     You will be asked to enter the access code listed below, as well as some personal information. Please follow the directions to create your username and password.     Your access code is: [email or web address]  Expires: 2017 12:18 PM     Your access code will  in 90 days. If you need help or a new code, please call your [facility] clinic or [phone].        Care EveryWhere ID     This is your Care EveryWhere ID. This could be used by other organizations to access your [facility] medical records  [id number]           Review of your medicines      START taking        Dose / Directions    levofloxacin 500 MG tablet   Commonly known as:  LEVAQUIN   Indication:  Community Acquired Pneumonia        Dose:  500 mg   Start taking on:  2017   Take 1 tablet (500 mg) by mouth daily for 5 days   Quantity:  5 tablet   Refills:  0       umeclidinium-vilanterol 62.5-25 MCG/INH oral inhaler   Commonly known as:  ANORO ELLIPTA        Dose:  1 puff   Inhale 1 puff into the lungs daily   Quantity:  1 Inhaler   Refills:  11         CONTINUE these medicines which may have CHANGED, or have new prescriptions. If we are uncertain of the size of tablets/capsules you have at home, strength may be listed as something that might have changed.        Dose / Directions    predniSONE 10 MG tablet   Commonly known as:  DELTASONE   This may have changed:  See the new instructions.        4 tabs PO daily x3, then 3 tabs PO daily x3, then 2 tabs PO daily x3, then 1 tab PO daily x3, then stop   Quantity:  30 tablet   Refills:  0         CONTINUE these medicines which have NOT CHANGED        Dose / Directions    clopidogrel 75 MG tablet   Commonly known as:  PLAVIX   Used for:  Coronary artery disease involving coronary bypass graft of native heart without angina pectoris        TAKE ONE TABLET BY MOUTH ONCE DAILY   Quantity:  90 " tablet   Refills:  0       FLAXSEED        Dose:  2000 mg   2,000 mg 2 times daily   Refills:  0       * LOPRESSOR PO        Dose:  25 mg   Take 25 mg by mouth every evening   Refills:  0       * LOPRESSOR PO        Dose:  50 mg   Take 50 mg by mouth every morning   Refills:  0       lovastatin 20 MG tablet   Commonly known as:  MEVACOR   Used for:  Mixed hyperlipidemia        TAKE ONE TABLET BY MOUTH ONCE DAILY   Quantity:  90 tablet   Refills:  1       * Notice:  This list has 2 medication(s) that are the same as other medications prescribed for you. Read the directions carefully, and ask your doctor or other care provider to review them with you.         Where to get your medicines      These medications were sent to [facility] - [place], [place] - [address]   [id number] , [place] [address]     Phone:  [phone]     levofloxacin 500 MG tablet    predniSONE 10 MG tablet    umeclidinium-vilanterol 62.5-25 MCG/INH oral inhaler                Protect others around you: Learn how to safely use, store and throw away your medicines at www.disposemymeds.org.             Medication List: This is a list of all your medications and when to take them. Check marks below indicate your daily home schedule. Keep this list as a reference.      Medications           Morning Afternoon Evening Bedtime As Needed    clopidogrel 75 MG tablet   Commonly known as:  PLAVIX   TAKE ONE TABLET BY MOUTH ONCE DAILY   Last time this was given:  75 mg on 6/8/2017  8:12 AM                                FLAXSEED   2,000 mg 2 times daily                                levofloxacin 500 MG tablet   Commonly known as:  LEVAQUIN   Take 1 tablet (500 mg) by mouth daily for 5 days   Start taking on:  6/9/2017                                * LOPRESSOR PO   Take 25 mg by mouth every evening   Last time this was given:  50 mg on 6/8/2017  8:12 AM                                * LOPRESSOR PO   Take 50 mg by mouth every morning   Last time  this was given:  50 mg on 6/8/2017  8:12 AM                                lovastatin 20 MG tablet   Commonly known as:  MEVACOR   TAKE ONE TABLET BY MOUTH ONCE DAILY                                predniSONE 10 MG tablet   Commonly known as:  DELTASONE   4 tabs PO daily x3, then 3 tabs PO daily x3, then 2 tabs PO daily x3, then 1 tab PO daily x3, then stop   Last time this was given:  40 mg on 6/8/2017  8:12 AM                                umeclidinium-vilanterol 62.5-25 MCG/INH oral inhaler   Commonly known as:  ANORO ELLIPTA   Inhale 1 puff into the lungs daily   Last time this was given:  1 puff on 6/8/2017 11:03 AM                                * Notice:  This list has 2 medication(s) that are the same as other medications prescribed for you. Read the directions carefully, and ask your doctor or other care provider to review them with you.              More Information        Pneumonia (Adult)  Pneumonia is an infection deep within the lungs. It is in the small air sacs (alveoli). Pneumonia may be caused by a virus or bacteria. Pneumonia caused by bacteria is usually treated with an antibiotic. Severe cases may need to be treated in the hospital. Milder cases can be treated at home. Symptoms usually start to get better during the first 2 days of treatment.    Home care  Follow these guidelines when caring for yourself at home:    Rest at home for the first 2 to 3 days, or until you feel stronger. Don t let yourself get overly tired when you go back to your activities.    Stay away from cigarette smoke - yours or other people s.    You may use acetaminophen or ibuprofen to control fever or pain, unless another medicine was prescribed. If you have chronic liver or kidney disease, talk with your health care provider before using these medicines. Also talk with your provider if you ve had a stomach ulcer or GI bleeding. Don t give aspirin to anyone younger than 18 years of age who is ill with a fever. It may  cause severe liver damage.    Your appetite may be poor, so a light diet is fine.    Drink 6 to 8 glasses of fluids every day to make sure you are getting enough fluids. Beverages can include water, sport drinks, sodas without caffeine, juices, tea, or soup. Fluids will help loosen secretions in the lung. This will make it easier for you to cough up the phlegm (sputum). If you also have heart or kidney disease, check with your health care provider before you drink extra fluids.    Take antibiotic medicine prescribed until it is all gone, even if you are feeling better after a few days.  Follow-up care  Follow up with your health care provider in the next 2 to 3 days, or as advised. This is to be sure the medicine is helping you get better.  If you are 65 or older, you should get a pneumococcal vaccine and a yearly flu (influenza) shot. You should also get these vaccines if you have chronic lung disease like asthma, emphysema, or COPD. Ask your provider about this.  When to seek medical advice  Call your health care provider right away if any of these occur:    You don t get better within the first 48 hours of treatment    Shortness of breath gets worse    Rapid breathing (more than 25 breaths per minute)    Coughing up blood    Chest pain gets worse with breathing    Fever of 102 F (38 C) or higher that doesn t get better with fever medicine    Weakness, dizziness, or fainting that gets worse    Thirst or dry mouth that gets worse    Sinus pain, headache, or a stiff neck    Chest pain not caused by coughing    6925-5920 [employer]. [address] [place] [address], [place] [address]. All rights reserved. This information is not intended as a substitute for professional medical care. Always follow your healthcare professional's instructions.                Discharge Instructions for Pneumonia  You have been diagnosed with pneumonia, a serious lung infection. Most cases of pneumonia are caused by bacteria.  Pneumonia most often occurs in older adults, young children, and people with chronic health problems.  Home care    Take your medication exactly as directed. Don t skip doses. Continue taking your antibiotics as directed until they are all gone -- even if you start to feel better. This will prevent the pneumonia from coming back.    Drink at least 8 glasses of water daily, unless directed otherwise. This helps to loosen and thin secretions so that you can cough them up.    Use a cool-mist humidifier in your bedroom. Be sure to clean the humidifier daily.    Coughing up mucus is normal. Don t use medications to suppress your cough unless your cough is dry, painful, or interferes with your sleep. You may use an expectorant if ordered by your doctor.    Warm compresses or a heating pad on the lowest setting can be used to relieve chest discomfort. Use several times a day for 15 to 20 minutes at a time. (To prevent injuring your skin, be sure the temperature of the compress or heating pad is warm, not hot.)    Get plenty of rest until your fever, shortness of breath, and chest pain go away.    Plan to get a flu shot every year.    Ask your doctor about pneumonia vaccinations.     Follow-up care  Make a follow-up appointment as directed by our staff.     When to seek medical care  Call 911 right away if you have any of the following:    Chest pain    Trouble breathing    Blue lips or fingernails  Otherwise, call your doctor if you have any of the following:    Fever above 101.5 F  (38.6 C)    Yellow, green, bloody, or smelly sputum    More than normal mucus production    Vomiting     4350-4283 [employer]. [address] [place] [address], [place] [address]. All rights reserved. This information is not intended as a substitute for professional medical care. Always follow your healthcare professional's instructions.                What is Pneumonia?  Pneumonia is a serious lung infection. Many cases of pneumonia are  caused by bacteria or viruses. Other less common causes include    Fungi    Chemicals    Gases    You may also get pneumonia after another illness, such as a cold, flu, or bronchitis. Those most at risk include the elderly and people with chronic health problems.  Healthy Lungs    Air travels in and out of the lungs through tubes called airways.    The tubes branch into smaller passages called bronchioles. These end in balloonlike sacs called alveoli.    Blood vessels surrounding the alveoli take oxygen into the bloodstream. At the same time, the alveoli remove carbon dioxide (a waste gas) from the blood. The carbon dioxide is then exhaled.  When You Have Pneumonia      Pneumonia causes the bronchioles and the alveoli to fill with excess mucus and become inflamed.    Your body s response may be to cough. This can help clear out the fluid.    The fluid (or mucus) you cough up may appear green or dark yellow.    The excess mucus may make you feel short of breath.    The inflammation and infection may give you a fever.  What are the Symptoms?  Symptoms of pneumonia can come without warning. At first, you may think you have a cold or flu. But symptoms may get worse quickly, turning into pneumonia. Symyptoms can be different for bacterial and viral pneumonia. Common symptoms may include the following:    Severe cough with green or yellow mucus that doesn't improve or gets worse    Fever and chills    Nausea, vomiting or diarrhea    Shortness of breath with normal daily activities    Increased heart rate    Chest pain or discomfort when breathing in or coughing    Headache    Excessive sweating and clammy skin    7711-3563 [employer]. [address] [place] [address]. All rights reserved. This information is not intended as a substitute for professional medical care. Always follow your healthcare professional's instructions.                Treating Pneumonia  Pneumonia is an infection of one or  both of the lungs. Pneumonia:    Is usually caused by a virus    Can be very serious, especially in infants, young children, and older adults. And also in those with other long-term health problems or weakened immune systems    Is sometimes treated at home and sometimes in the hospital    Antibiotic Medications  Antibiotics may be prescribed or administered for pneumonia caused by bacteria. They may be pills or oral medications, or shots or injections. Or they may be given intravenously (IV) or into the vein. If you are taking oral medications at home:    Fill your prescription and start taking your medication as soon as you can.    You will likely start to feel better in a day or two, but don t stop taking the antibiotic.    Use a pill organizer to help you remember to take your medication.    Let your health care provider know if you have side effects.    Take your medication exactly as directed on the label. Talk to your pharmacist if you have any questions.  Antiviral Medications  Antiviral medication may be prescribed or given for pneumonia cause by a virus. For example, antiviral medication may be prescribed for pneumonia caused by the flu virus. Antibiotics do not work against viruses. If you are taking antiviral medication at home:    Fill your prescription and start taking your medication as soon as you can.    Talk with your provider or pharmacist about possible side effects.    Take the medication exactly as instructed.  To Relieve Symptoms  There are many medications that can help relieve symptoms of pneumonia. Some are prescription and some are over-the-counter.  Your health care provider may recommend:    Acetaminophen or ibuprofen to lower your fever and to lessen headache or other pain    Cough medication to loosen mucus or to reduce coughing  Make sure you check with your health care provider or pharmacist before taking any over-the-counter medications.  Special Treatments  If you are hospitalized  for pneumonia, you may have other therapies, including:    Inhaled medications to help with breathing or chest congestion    Supplemental oxygen to increase low oxygen levels  Drink Fluids and Eat Healthy  You should eat healthy to help your body fight the infection and drink a lot of fluids to replace fluids lost from fever and to help loosen mucus in the chest.    Diet. Make health food choices, including fruits and vegetables, lean meats and other proteins, 100% whole grain and low- or no-fat dairy products.    Fluids. Drinks at least 6-8 tall glasses a day. Water and 100% fruit or vegetable juice are best.  Get Plenty of Rest and Sleep  You may be more tired than usual for a while. It is important to get enough sleep at night. And, it's important to rest during the day. Talk with your health care provider if coughing or other symptoms are interfering with your sleep.  Preventing the Spread of Germs  The best thing you can do to prevent spreading germs is to wash your hands often. You should:    Rub your hand with soap and water for 20 to 30 seconds.    Clean in between your fingers, the backs of your hands, and around your nails.    Dry your hands on a separate towel or use paper towels.  You should also:    Keep alcohol-based hand  nearby.    Make sure you also clean surfaces that you touch. Use a product that kills all types of germs.    Stay away from others until you are feeling better.  When to Call Your Health Care Provider  Call your health care provider if you have any of these:    Symptoms get worse    Fever continues    Shortness of breath gets worse    Increased mucus or mucus that is darker in color    Coughing gets worse    Lips or fingers are bluish in color    Side effects from your medication     2816-5260 [employer]. [address] [place] [address], [place] [address]. All rights reserved. This information is not intended as a substitute for professional medical care. Always follow  your healthcare professional's instructions.

## 2017-06-07 NOTE — PROVIDER NOTIFICATION
Notified Dr. Gharaibeh that pt reports she is taking lopressor differently than ordered. 50mg in the morning and 25mg at night. PTA med list updated. Will monitor for new orders.

## 2017-06-07 NOTE — ED NOTES
Up and walked in the dept, pt states felt great ready to go home.   sats decreased to 76% on RA very sob.  Oxygen on pt, sats up to 89% on 2 liters.

## 2017-06-07 NOTE — ED NOTES
Spoke with daughter, concerned over mother's well being, States she feels she won't wear 02 at home and needs monitoring..

## 2017-06-07 NOTE — ED PROVIDER NOTES
"  History     Chief Complaint   Patient presents with     COPD     low sats and sob since yest     HPI  Sheila Nuñez is a 71 year old female who has COPD and still smokes 1/2 PPD.  This morning she started having increased dyspnea, checked her oxygen saturation on her 's oximeter and was 83%.  Her daughter insisted she come to the hospital, she did agree because of the oxygen sat and dyspnea, but wanted a quick fix and to go home.  She ran out of her inhaler some time ago, has not gotten a refill.  She is on no medications for her COPD at this time, and is on metoprolol.      I have reviewed the Medications, Allergies, Past Medical and Surgical History, and Social History in the Epic system.    Review of Systems   Constitutional: Positive for activity change, fatigue and fever.        Low grade fever   HENT: Negative.    Respiratory: Positive for cough, shortness of breath and wheezing.    Cardiovascular: Negative for chest pain, palpitations and leg swelling.   Gastrointestinal: Negative for abdominal pain.   Genitourinary: Negative.    Musculoskeletal: Negative.    Skin: Positive for pallor.   Neurological: Negative.    Psychiatric/Behavioral: The patient is nervous/anxious.        Physical Exam   BP: 177/90  Pulse: 92  Heart Rate: 85  Temp: 99.7  F (37.6  C)  Resp: 20  Height: 158.8 cm (5' 2.5\")  SpO2: (!) 86 %  Physical Exam   Constitutional: She is oriented to person, place, and time. She appears well-developed and well-nourished. She appears distressed.   HENT:   Head: Normocephalic and atraumatic.   Neck: Normal range of motion. Neck supple.   Cardiovascular: Normal rate, regular rhythm, normal heart sounds and intact distal pulses.    No murmur heard.  Pulmonary/Chest: She is in respiratory distress. She has wheezes.   Abdominal: Soft. Bowel sounds are normal. She exhibits no distension. There is no tenderness.   Musculoskeletal: Normal range of motion.   Neurological: She is alert and oriented " to person, place, and time.   Skin: Skin is warm and dry.   Psychiatric: She has a normal mood and affect.   Nursing note and vitals reviewed.      ED Course     ED Course     Procedures             EKG Interpretation:      Interpreted by Sylvia Humphrey  Time reviewed: 1021  Symptoms at time of EKG: dyspnea   Rhythm: normal sinus   Rate: Normal  Axis: Normal  Ectopy: premature atrial contraction  Conduction: normal  ST Segments/ T Waves: No ST-T wave changes  Q Waves: none  Comparison to prior: prolonged Q waves on previous EKG resolved    Clinical Impression: no acute changes and non-specific EKG      Labs Ordered and Resulted from Time of ED Arrival Up to the Time of Departure from the ED   CBC WITH PLATELETS DIFFERENTIAL - Abnormal; Notable for the following:        Result Value    WBC 12.5 (*)     Absolute Neutrophil 10.5 (*)     All other components within normal limits   BASIC METABOLIC PANEL - Abnormal; Notable for the following:     Glucose 124 (*)     All other components within normal limits   VITAL SIGNS   PULSE OXIMETRY NURSING   CARDIAC CONTINUOUS MONITORING   PERIPHERAL IV CATHETER       Assessments & Plan (with Medical Decision Making)   Patient has COPD exacerbation with possible pneumonia.  Antibiotics initiated in ED after pneumonia noted by radiologist.  Patient's oxygen saturation dropped to 86% with ambulation to bathroom, as low as 73% on ambulation in hallway without oxygen.  Patient will be admitted to medicine by Dr. Delgadillo.    I have reviewed the nursing notes.    I have reviewed the findings, diagnosis, plan and need for follow up with the patient.    Current Discharge Medication List          Final diagnoses:   COPD exacerbation (H)       6/7/2017   [facility]     Sylvia Campbell MD  06/07/17 0851

## 2017-06-07 NOTE — TELEPHONE ENCOUNTER
Please schedule patient for date/time: Unable to see today, please see if another provider can see her or Urgent Care.     Have patient go to ER/Urgent Care Center. Urgent Care hours are 9:30 am to 8 pm, open 7 days a week. Yes.    Provider will call patient.No.    Other:

## 2017-06-07 NOTE — PLAN OF CARE
[facility] Inpatient Admission Note:    Patient admitted to 3210/3210-1 at approximately 1520 via cart accompanied by daughter from emergency room . Report received from Alanna TITUS in SBAR format at 1515 via telephone. Patient transferred to bed via self.. Patient is alert and oriented X 3, denies pain; rates at 0 on 0-10 scale.  Patient oriented to room, unit, hourly rounding, and plan of care. Explained admission packet and patient handbook with patient bill of rights brochure. Will continue to monitor and document as needed.     Inpatient Nursing criteria listed below was met:      Health care directives status obtained and documented: No      Care Everywhere authorization obtained No      MRSA swab completed for patient 65 years and older: Yes      Patient identifies a surrogate decision maker: Yes If yes, who: Contact Information:      Core Measure diagnosis present:Yes. If yes, state diagnosis: pneumonia       If initial lactic acid >2.0, repeat lactic acid drawn within one hour of arrival to unit: NA. If no, state reason: none done      Vaccination assessment and education completed: Yes   Vaccinations received prior to admission: Pneumovax no  Influenza(seasonal)  N/A   Vaccination(s) ordered: patient declines      Clergy visit ordered if patient requests: N/A      Skin issues/needs documented: N/A      Isolation Patient: no Education given, correct sign in place and documentation row added to PCS:  N/a        Fall Prevention No: Care plan updated, education given and documented, sticker and magnet in place: N/A      Care Plan initiated: Yes      Education Documented (including assessment): Yes      Patient has discharge needs : No If yes, please explain:

## 2017-06-07 NOTE — IP AVS SNAPSHOT
[facility]    [address] [address]    Phone:  [phone]    Fax:  [phone]                                       After Visit Summary   6/7/2017    Sheila Nuñez    MRN: [id number]           After Visit Summary Signature Page     I have received my discharge instructions, and my questions have been answered. I have discussed any challenges I see with this plan with the nurse or doctor.    ..........................................................................................................................................  Patient/Patient Representative Signature      ..........................................................................................................................................  Patient Representative Print Name and Relationship to Patient    ..................................................               ................................................  Date                                            Time    ..........................................................................................................................................  Reviewed by Signature/Title    ...................................................              ..............................................  Date                                                            Time

## 2017-06-07 NOTE — ED NOTES
Pt's daughter came to desk wanting to talk to the doctor or nurse before her mother comes back from Xray

## 2017-06-08 VITALS
DIASTOLIC BLOOD PRESSURE: 65 MMHG | OXYGEN SATURATION: 92 % | TEMPERATURE: 98 F | WEIGHT: 121.69 LBS | RESPIRATION RATE: 18 BRPM | HEART RATE: 89 BPM | BODY MASS INDEX: 21.56 KG/M2 | SYSTOLIC BLOOD PRESSURE: 157 MMHG | HEIGHT: 63 IN

## 2017-06-08 LAB — GLUCOSE BLDC GLUCOMTR-MCNC: 125 MG/DL (ref 70–99)

## 2017-06-08 PROCEDURE — 94640 AIRWAY INHALATION TREATMENT: CPT

## 2017-06-08 PROCEDURE — 00000146 ZZHCL STATISTIC GLUCOSE BY METER IP

## 2017-06-08 PROCEDURE — 25000132 ZZH RX MED GY IP 250 OP 250 PS 637: Performed by: INTERNAL MEDICINE

## 2017-06-08 PROCEDURE — 94640 AIRWAY INHALATION TREATMENT: CPT | Mod: 76

## 2017-06-08 PROCEDURE — 99239 HOSP IP/OBS DSCHRG MGMT >30: CPT | Performed by: INTERNAL MEDICINE

## 2017-06-08 PROCEDURE — 25000125 ZZHC RX 250: Performed by: INTERNAL MEDICINE

## 2017-06-08 RX ORDER — PREDNISONE 10 MG/1
10 TABLET ORAL DAILY
Status: DISCONTINUED | OUTPATIENT
Start: 2017-06-14 | End: 2017-06-08 | Stop reason: HOSPADM

## 2017-06-08 RX ORDER — PREDNISONE 20 MG/1
40 TABLET ORAL DAILY
Status: DISCONTINUED | OUTPATIENT
Start: 2017-06-08 | End: 2017-06-08 | Stop reason: HOSPADM

## 2017-06-08 RX ORDER — PREDNISONE 20 MG/1
20 TABLET ORAL DAILY
Status: DISCONTINUED | OUTPATIENT
Start: 2017-06-12 | End: 2017-06-08 | Stop reason: HOSPADM

## 2017-06-08 RX ORDER — PREDNISONE 10 MG/1
TABLET ORAL
Qty: 30 TABLET | Refills: 0 | Status: SHIPPED | OUTPATIENT
Start: 2017-06-08 | End: 2017-06-21

## 2017-06-08 RX ORDER — LEVOFLOXACIN 500 MG/1
500 TABLET, FILM COATED ORAL DAILY
Qty: 5 TABLET | Refills: 0 | Status: SHIPPED | OUTPATIENT
Start: 2017-06-09 | End: 2017-06-14

## 2017-06-08 RX ORDER — IPRATROPIUM BROMIDE AND ALBUTEROL SULFATE 2.5; .5 MG/3ML; MG/3ML
3 SOLUTION RESPIRATORY (INHALATION) EVERY 4 HOURS PRN
Status: DISCONTINUED | OUTPATIENT
Start: 2017-06-08 | End: 2017-06-08 | Stop reason: HOSPADM

## 2017-06-08 RX ADMIN — CLOPIDOGREL 75 MG: 75 TABLET, FILM COATED ORAL at 08:12

## 2017-06-08 RX ADMIN — PREDNISONE 40 MG: 20 TABLET ORAL at 08:12

## 2017-06-08 RX ADMIN — UMECLIDINIUM BROMIDE AND VILANTEROL TRIFENATATE 1 PUFF: 62.5; 25 POWDER RESPIRATORY (INHALATION) at 11:03

## 2017-06-08 RX ADMIN — IPRATROPIUM BROMIDE AND ALBUTEROL SULFATE 3 ML: .5; 3 SOLUTION RESPIRATORY (INHALATION) at 11:04

## 2017-06-08 RX ADMIN — METOPROLOL TARTRATE 50 MG: 50 TABLET, FILM COATED ORAL at 08:12

## 2017-06-08 RX ADMIN — IPRATROPIUM BROMIDE AND ALBUTEROL SULFATE 3 ML: .5; 3 SOLUTION RESPIRATORY (INHALATION) at 07:16

## 2017-06-08 NOTE — PLAN OF CARE
Problem: Patient Goal: Social Work Focus  Goal: 1. Patient Goal: Social Work Focus  Outcome: Adequate for Discharge Date Met:  06/08/17  Assessment done via flowsheet.     LOC: awake, alert and oriented X3  Others present: daughter Almaz     Primary PCP: Dr Littlejohn  Healthcare directive: no, does not want information     Support system: daughter     Lives at: home in [place]  With:  Jaylen  Equipment used: none, does not feel any is needed  Home/[facility] services: none  Pharmacy: [facility]     Adequate resources for needs (housing, utilities, transportation, food/med): no concerns  Meds and appointment management: independent, no concerns     ADL's: independent  Ambulation: independent, no devices  Household: maintains independently with      Falls: none  Exposure to violence/abuse: denied     Mental health or substance abuse: denies concerns, does not currently work with any mental health providers  She was provided information on smoking cessation programs through [employer], she was also encouraged to work with her PCP and insurance company to access covered cessation options.      [place]: no     Transportation: drives, also gets ride from her daughter Almaz    We reviewed the Medicare IM letter including the right to remain here for 4 hours; she voiced acknowledgement and signed the letter.      [place]: low     Goal: return home, oxygen to be arranged by respiratory     Barriers: none

## 2017-06-08 NOTE — DISCHARGE SUMMARY
[facility]    Discharge Summary  Hospitalist    Date of Admission:  6/7/2017  Date of Discharge:  6/8/2017  Discharging Provider: Monty Bailey  Date of Service (when I saw the patient): 06/08/17    Discharge Diagnoses   Acute on chronic respiratory failure, predominantly hypoxemic.  Severe chronic obstructive pulmonary disease in exacerbation.  Possible acute bronchitis related to chronic obstructive pulmonary disease exacerbation    History of Present Illness   Ms. Sheila Nuñez is a [id number] with history of COPD,coronary artery disease, dyslipidemia and chronic cigarette use who presented to the Emergency Department complaining of shortness of breath.  This morning when her breathing gradually got worse and she took her pulse oximetry using her 's unit and it was 83%.  Her daughter insisted on her to come to the Emergency Department.  In the Emergency Department, the patient was found to have persistent hypoxemia, particularly with minimal activity. She was, however, rapidly responsive to low flow oxygen.  The patient does not wear oxygen at home.  The patient denied chest pain, no cough, no sputum production, no chills.  The patient denied any other symptoms.  In the Emergency Department, the patient received ipratropium, bromide/albuterol, methylprednisolone, levofloxacin and Zosyn.     Hospital Course   Sheila Nuñez was admitted on 6/7/2017.  The following problems were addressed during her hospitalization:    1.  Acute on chronic respiratory failure.  Marked by hypoxemia, although the patient herself notes that even when she is feeling well.  Her oxygen saturations may be in the mid 80s at times.  However, she had increase in dyspnea, inspiratory effort,  And respiratory frequency.  On presentation to emergency department and certainly acute hypoxemic respiratory failure was present as well.  As below.  No evidence of a lower respiratory tract infection.  Overall meet  criteria for an exacerbation of chronic obstructive pulmonary disease, clinically severe.  2.  Chronic obstructive pulmonary disease in exacerbation.  Prolonged history of tobacco abuse, likely greater than 100 pack years on discussion with her.  Overall, appears to present with exacerbation of chronic obstructive pulmonary disease without clear evidence of an infectious source, although an acute bronchitis cannot be eliminated with competence.  Chest radiograph predominantly shows hyperinflation.  She does have increased markings in the right lower lobe.  However, this is not changed compared to study in January.  She has had persistent dyspnea and multiple courses of prednisone over the period of time from January to now.  Overall, quite unlikely.  The risks represents a lower respiratory tract infection.  Cannot eliminate acute bronchitis or upper airway infection in the context of exacerbation of chronic obstructive pulmonary disease.  Will continue a 5 day course of levofloxacin.  In other respects.  Continue short acting bronchodilators.  At baseline she uses Combivent infrequently, never more than 4 times daily.  She notes little dyspnea at rest.  On review of her records, however she has had multiple short courses of prednisone, particularly over the past 6 months.  Extensive discussion with her regarding treatment for her chronic obstructive pulmonary disease.  Of recommended pulmonary function tests to establish her baseline in another 4-6 weeks.  Recommended a long-acting agent. Umelidinium/vilanterol (Anoro) (initially.  Likely that a long-acting anticholinergic such as this is the best initial long-acting agent for chronic obstructive pulmonary disease.  At time of discharge while her dyspnea is markedly improved and both she and her daughter agree that she looks and feels better, she still quite hypoxemic, although this may be near her baseline.  Discussed with her the role of supplemental oxygen.   "Prescribed 2 L of oxygen by nasal cannula at time of discharge.  At the time of discharge while breathing room air at rest.  Oxygen saturation 84-85 percent.  92 percent while breathing 2 L of oxygen.  Recommended use of this at least 18 hours per day.  Discussed with her that oxygen does not decrease, dyspnea or other uuncomfortable respiratory sensation.  3.  Tobacco abuse.  Extensive discussion  Regarding the high importance of stopping smoking.  She is indicated.  She has done this multiple times in the past for up to 5 years.  I strongly encouraged her that if she is able to do that.  It is very likely she will be successful at another attempt at cessation.  Given information regarding outpatient tobacco cessation support resources.    At the time of discharge, she is quite anxious to leave the hospital.  I discussed with her that another day of hospitalization might allow better stabilization of her respiratory function.  She is insistent, however, on leaving.  She has plans today that were made at sometime in the past.  All she might benefit from further hospitalization.  It is not frankly unsafe for her to leave.  I discussed my reservations.. Arrange relatively rapid follow-up.  Discussed with her that it is important that she return to hospital if she has recurrence of  Significant dyspnea.  He is  Monty Bailey        Pending Results   These results will be followed up by Dr. Littlejohn  Unresulted Labs Ordered in the Past 30 Days of this Admission     Date and Time Order Name Status Description    6/7/2017 1658 Active MRSA Surveillance Culture Preliminary           Code Status   DNR / DNI       Primary Care Physician   David Littlejohn    Vital signs:  Temp: 98  F (36.7  C) Temp src: Tympanic BP: 157/65 Pulse: 89 Heart Rate: 77 Resp: 18 SpO2: 92 % O2 Device: Nasal cannula Oxygen Delivery: 2 LPM Height: 158.8 cm (5' 2.5\") Weight: 55.2 kg (121 lb 11.1 oz)  Estimated body mass index is 21.9 kg/(m^2) as " "calculated from the following:    Height as of this encounter: 1.588 m (5' 2.5\").    Weight as of this encounter: 55.2 kg (121 lb 11.1 oz).        Awake, alert, concentration preserved. Oriented ×3   HEENT: Pupils equal, conjugate. No icterus or nystagmus. Oral mucosa moist. No facial asymmetry.   Neck: Supple, jugular veins not elevated. Trachea midline   Chest: No chest wall movement asymmetry. Aeration globally markedly diminished. Accessory muscles not in use. Expiratory time moderately increased. No tidal wheezes. Basilar medium rhonchi. No discrete crackles.   Cardiac: PMI not displaced. S1, S2 unremarkable. No S3, S4. P2 questionably accentuated. No murmurs. Carotid upstroke preserved. Carotid amplitude preserved.   Abdomen: Soft. No palpation or percussion tenderness. No distention. Normoactive bowel sounds. Liver and spleen not increased in size. No bruits, masses, or pulsations.   Extremities: No lower extremity edema. Extremities warm distally. No eccymoses, clubbing.   Neurologic: Mental state above. Motor 5/5 and bilaterally equal. Tone preserved. No fasiculations or tremors. Sensation intact to light touch. DTR 2/4 and bilaterally equal.     Discharge Disposition   Discharged to home  Condition at discharge: Stable    Consultations This Hospital Stay   CARE COORDINATOR IP CONSULT  RESPIRATORY CARE IP CONSULT    Time Spent on this Encounter   I, Monty Bailey, personally saw the patient today and spent greater than 30 minutes discharging this patient.    Discharge Orders     Reason for your hospital stay   Ms. Sheila Nuñez is a 71-year-old woman with history of COPD, clinically severe, coronary artery disease, dyslipidemia and chronic smoking who presented to the Emergency Department because of shortness of breath.  This morning her pulse oximetry at home showed 83%.  Her daughter insisted on her to come to the Emergency Department.  In the Emergency Department, the patient was found to have " low sats and she had to be placed on oxygen.  By the next morning she felt markedly improved.  She was anxious to leave the hospital.  She reluctantly agreed to home oxygen.  Added to dorsa as a long-acting bronchodilator with inhaled corticosteroid.  Tapering dose of prednisone.     Follow-up and recommended labs and tests    Follow up with primary care provider, David Littlejohn, within 7-14 days for hospital follow- up.     Activity   Your activity upon discharge: activity as tolerated     DNR/DNI     Oxygen Adult   [facility] Order 02 liter(s) by nasal cannula continuously with use of portable tank. Expected treatment length is indefinite (99 months).. Test on conserving device as applicable.    Patients who qualify for home O2 coverage under the CMS guidelines require ABG tests or O2 sat readings obtained THE DAY OF DISCHARGE WHILE IN STABLE CONDITION; 85 PERCENT ON ROOM AIR AT REST;  92 PERCENT, BREATHING 2 l OF OXYGEN VIA NASAL CANNULA  Testing must be performed while patient is in the chronic stable state. See notes for O2 sats.    I certify that this patient, Sheila Nuñez has been under my care and that I, or a nurse practitioner or physician's assistant working with me, had a face-to-face encounter that meets the face-to-face encounter requirements with this patient on 6/8/2017. The patient, Sheila Nuñez was evaluated or treated in whole, or in part, for the following medical condition, which necessitates the use of the ordered oxygen. Treatment Diagnosis: Severe COPD    Attending Provider: Monty Bailey  Physician signature: See electronic signature associated with these discharge orders  Date of Order: June 8, 2017     General PFT Lab (Please always keep checked)     Pulmonary Function Test   ** Bronchodilators/neb treatments should be held 4-6 hours prior to receiving a bronchodilator study.    [facility] will distribute Patient Information Packet;     [facility] ([facility]) will contact patient by telephone    Please call the following departments if there are questions or need assistance:  [facility]: [phone]  [facility]: [phone]  [facility]: [phone]  [facility] Registered: [phone]  [facility]: [phone]  [facility]: [phone]           Diet   Follow this diet upon discharge: Orders Placed This Encounter     Combination Diet Regular Diet Adult       Discharge Medications   Current Discharge Medication List      START taking these medications    Details   umeclidinium-vilanterol (ANORO ELLIPTA) 62.5-25 MCG/INH oral inhaler Inhale 1 puff into the lungs daily  Qty: 1 Inhaler, Refills: 11    Associated Diagnoses: COPD exacerbation (H)      levofloxacin (LEVAQUIN) 500 MG tablet Take 1 tablet (500 mg) by mouth daily for 5 days  Qty: 5 tablet, Refills: 0    Associated Diagnoses: COPD exacerbation (H)         CONTINUE these medications which have CHANGED    Details   predniSONE (DELTASONE) 10 MG tablet 4 tabs PO daily x3, then 3 tabs PO daily x3, then 2 tabs PO daily x3, then 1 tab PO daily x3, then stop  Qty: 30 tablet, Refills: 0    Associated Diagnoses: COPD exacerbation (H)         CONTINUE these medications which have NOT CHANGED    Details   !! Metoprolol Tartrate (LOPRESSOR PO) Take 25 mg by mouth every evening      !! Metoprolol Tartrate (LOPRESSOR PO) Take 50 mg by mouth every morning      lovastatin (MEVACOR) 20 MG tablet TAKE ONE TABLET BY MOUTH ONCE DAILY  Qty: 90 tablet, Refills: 1    Associated Diagnoses: Mixed hyperlipidemia      clopidogrel (PLAVIX) 75 MG tablet TAKE ONE TABLET BY MOUTH ONCE DAILY  Qty: 90 tablet, Refills: 0    Associated Diagnoses: Coronary artery disease involving coronary bypass graft of native heart without angina pectoris      FLAXSEED 2,000 mg 2 times daily        !! - Potential duplicate medications found. Please  discuss with provider.        Allergies   Allergies   Allergen Reactions     Ciprofloxacin Other (See Comments)     Cipro  intolerance     Influenza Vaccines      Influenza virus vacc. Specific       Morphine Other (See Comments)     Becomes very mean     Nitrofurantoin Other (See Comments)     Macrobid  intolerance     Nitrofurantoin Macrocrystal Other (See Comments)     Macrobid  Intolerance       Data   Most Recent 3 CBC's:  Recent Labs   Lab Test  06/07/17   1005  09/03/15   0650  09/02/15   0925   WBC  12.5*  12.3*  15.1*   HGB  15.2  13.7  14.7   MCV  87  89  87   PLT  209  201  182      Most Recent 3 BMP's:  Recent Labs   Lab Test  06/07/17   1005  09/03/15   0650  09/02/15   0925   NA  133  140  130*   POTASSIUM  3.5  3.8  3.4   CHLORIDE  99  107  96   CO2  25  24  25   BUN  11  10  10   CR  0.71  0.54  0.62   ANIONGAP  9  9  9   MARIELA  8.7  9.0  8.9   GLC  124*  126*  126*     Most Recent 2 LFT's:  Recent Labs   Lab Test  09/02/15   0925  03/30/15   0857   AST  12  33   ALT  14  25   ALKPHOS  119  131   BILITOTAL  1.0  0.4     Most Recent INR's and Anticoagulation Dosing History:  Anticoagulation Dose History     There is no flowsheet data to display.        Most Recent 3 Troponin's:  Recent Labs   Lab Test  09/02/15   0925   TROPI  <0.015  The 99th percentile for upper reference range is 0.045 ug/L.  Troponin values in   the range of 0.045 - 0.120 ug/L may be associated with risks of adverse   clinical events.       Most Recent Cholesterol Panel:  Recent Labs   Lab Test  03/30/15   0857   CHOL  219*   LDL  116   HDL  55   TRIG  240*     Most Recent 6 Bacteria Isolates From Any Culture (See EPIC Reports for Culture Details):  Recent Labs   Lab Test  06/07/17   1703  09/02/15   1227  09/02/15   0952  09/02/15   0925   CULT  Culture in progress  No MRSA isolated  No growth after 6 days  No growth after 6 days     Most Recent TSH, T4 and A1c Labs:  Recent Labs   Lab Test  09/23/14   0940  02/26/14   1030   TSH   0.29*   --    T4  1.24   --    A1C   --   5.9     Results for orders placed or performed during the hospital encounter of 06/07/17   XR Chest 2 Views    Narrative    CHEST TWO VIEWS    HISTORY:  Shortness of breath.    COMPARISON:  Today's study is compared to a prior examination which is  dated January 25, 2017.    FINDINGS:  The cardiac silhouette and pulmonary vasculature are within  normal limits.  Lungs remain hyperinflated.  There is an area of  increased density at the right lung base that appears to represent  worsening right lower lobe pneumonia.    IMPRESSION:  PERSISTENT HYPERINFLATION OF THE LUNGS WITH THE  SUGGESTION OF A WORSENING RIGHT BASILAR PNEUMONIA.  Exam Date: Jun 07, 2017 12:20:00 AM  Author: ORLANDO SEARS  This report is final and signed      O

## 2017-06-08 NOTE — H&P
[facility]  History and Physical  Hospitalist    DATE OF SERVICE:  2017 at 5:27 p.m.      CHIEF COMPLAINT:     1.  Shortness of breath.   2.  Hypoxemia.      HISTORY OF PRESENT ILLNESS:  Ms. Sheila Nuñez is a 71-year-old female with history of COPD, asthma, coronary artery disease, dyslipidemia and chronic smoker who presented to the Emergency Department complaining of shortness of breath.  This morning when her breathing gradually got worse and she took her pulse oximetry through her 's and it was 83%.  Her daughter insisted on her to come to the Emergency Department.  In the Emergency Department, the patient was found to have low sats and she had to be placed on oxygen.  The patient does not wear oxygen at home.  The patient denied chest pain, no cough, no sputum production, no chills.  The patient denied any other symptoms.  In the Emergency Department, the patient received DuoNebs, methylprednisolone, levofloxacin and Zosyn.      REVIEW OF SYSTEMS:  Negative except listed in the HPI.       Past Medical History   I have reviewed this patient's medical history and updated it with pertinent information if needed.   Past Medical History:   Diagnosis Date     Asthma 1/3/2012     CHD (coronary heart disease) 2011     Dyslipidemia 10/06/2011     Tobacco abuse 2011       Past Surgical History   I have reviewed this patient's surgical history and updated it with pertinent information if needed.  Past Surgical History:   Procedure Laterality Date     ANGIOGRAM       stents         Prior to Admission Medications   Prior to Admission Medications   Prescriptions Last Dose Informant Patient Reported? Taking?   FLAXSEED 2017 at Unknown time  Yes Yes   Si,000 mg 2 times daily    clopidogrel (PLAVIX) 75 MG tablet 2017 at Unknown time  No Yes   Sig: TAKE ONE TABLET BY MOUTH ONCE DAILY   lovastatin (MEVACOR) 20 MG tablet 2017 at Unknown time  No Yes   Sig: TAKE ONE TABLET BY  MOUTH ONCE DAILY   metoprolol (LOPRESSOR) 50 MG tablet 6/7/2017 at Unknown time  No Yes   Sig: TAKE ONE TABLET BY MOUTH TWICE DAILY WITH MEALS   predniSONE (DELTASONE) 10 MG tablet 6/7/2017 at Unknown time  No Yes   Sig: TAKE THREE TABLETS BY MOUTH ONCE DAILY FOR 3 DAYS TWO ONCE DAILY FOR 3 DAYS ONE ONCE DAILY FOR 3 DAYS WITH FOOD      Facility-Administered Medications: None     Allergies   Allergies   Allergen Reactions     Ciprofloxacin Other (See Comments)     Cipro  intolerance     Ciprofloxacin Hcl Other (See Comments)     Cipro  intolerance     Influenza Vaccines      Influenza virus vacc. Specific       Morphine Other (See Comments)     Becomes very mean     Nitrofurantoin Other (See Comments)     Macrobid  intolerance     Nitrofurantoin Macrocrystal Other (See Comments)     Macrobid  Intolerance         Social History   I have reviewed this patient's social history and updated it with pertinent information if needed. Sheila Nuñez  reports that she has been smoking Cigarettes.  She has a 20.00 pack-year smoking history. She has never used smokeless tobacco. She reports that she does not drink alcohol or use illicit drugs.    Family History   I have reviewed this patient's family history and updated it with pertinent information if needed.   Family History   Problem Relation Age of Onset     C.A.D. Father 63     cause of death     C.A.D. Mother 78     cause of death       Recent Labs  Lab 06/07/17  1005   WBC 12.5*   HGB 15.2   MCV 87         POTASSIUM 3.5   CHLORIDE 99   CO2 25   BUN 11   CR 0.71   ANIONGAP 9   MARIELA 8.7   *       Recent Results (from the past 24 hour(s))   XR Chest 2 Views    Narrative    CHEST TWO VIEWS    HISTORY:  Shortness of breath.    COMPARISON:  Today's study is compared to a prior examination which is  dated January 25, 2017.    FINDINGS:  The cardiac silhouette and pulmonary vasculature are within  normal limits.  Lungs remain hyperinflated.  There is an area  of  increased density at the right lung base that appears to represent  worsening right lower lobe pneumonia.    IMPRESSION:  PERSISTENT HYPERINFLATION OF THE LUNGS WITH THE  SUGGESTION OF A WORSENING RIGHT BASILAR PNEUMONIA.  Exam Date: Jun 07, 2017 12:20:00 AM  Author: ORLANDO SEARS  This report is final and signed          PHYSICAL EXAMINATION:   VITAL SIGNS:  Afebrile 98.5, heart rate 83, blood pressure 150/86, satting 91% on 2 liters of oxygen.   GENERAL:  Pleasant lady without evidence of acute distress, alert, oriented x4.   HEENT:  No abnormality.     LUNGS:  Decreased air entry bilaterally significantly.  No wheezes, no crackles.   HEART:  Distant heart sounds, otherwise normal S1, S2, regular rate and rhythm.   ABDOMEN:  Soft, lax, nontender.   LOWER EXTREMITIES:  No edema or erythema.     NEUROLOGIC:  No focal deficits.   PSYCHIATRIC:  Normal affect.   SKIN:  No rashes.   NECK:  Supple.      LABORATORY DATA:  Chemistry was significant for mild hyponatremia, 133.  White count 12.5, hemoglobin of 15.2.        The patient had a chest x-ray which showed persistent hyperinflation of the lungs and it is reported has a worsening right basilar pneumonia.      ASSESSMENT AND PLAN:  Ms. Nuñez is a 71-year-old lady with history of dyslipidemia, coronary artery disease, asthma, chronic obstructive pulmonary disease, and chronic smoker who is being admitted with chronic obstructive pulmonary disease exacerbation with possible community-acquired pneumonia.   1.  Right basilar infiltrate concerning for community-acquired pneumonia.   -- The patient received Levaquin and Zosyn in the Emergency Department.  For sure we will discontinue Zosyn and I will keep her on levofloxacin and we will switch her to oral starting tomorrow for a total course of 7 days.   -- Continue supportive care with oxygen.   2.  Chronic obstructive pulmonary disease excerebration.  Antibiotic as above.   --  Continue Solu-Medrol 60 mg q.6 h.  for a total of 24 hours, then we will switch her to prednisone.   --  DuoNebs scheduled every 4 hours, then we will space it off for 24 hours.   --  Continue oxygen and try to wean off as necessary.  Patient may require home oxygen.   3.  Dyslipidemia.  Continue lovastatin.   4.  Coronary artery disease.  Continue Plavix, lovastatin, metoprolol.   5.  Deep venous thrombosis prophylaxis:  Lovenox.      CODE STATUS:  DNR/DNI, discussed this with the patient in detail.      KAMEL A. GHARAIBEH, MD          D: 2017 17:32   T: 2017 18:33   MT: RICHELLE      Name:     SEBASTIAN WEBSTER   MRN:      [id number]        Account:      [id number]   :      1945           Admitted:     [id number]      Document: [id number]

## 2017-06-08 NOTE — PLAN OF CARE
Problem: Patient Goal: Rt Focus  Goal: 1. Patient Goal: RT Focus  Outcome: No Change  [facility] - Respiratory Clinical Assessment     Current Patient History:    Respiratory History: COPD    Smoking History: 1 ppd    Oxygen dependency: Yes,    Oxygen prescribed: none     6/8/2017 5:14 AM Patient Initial Assessment:     Level of Consciousness: alert , cooperative    Skin color: pink    Lung sounds:diminished bilat.    Respirations:  Normal with easy respirations and no use of accessory muscles to breathe    Respiratory symptoms: productive cough    Cough/Sputum:  productive    Current oxygenation status: 93% on 2.5lpm nc        Comments:   Patient refusing nebs at night.  Given every 4hrs during day.  94% on 2.5lpm nc.  Diminished breath sounds.

## 2017-06-08 NOTE — PLAN OF CARE
Problem: Goal Outcome Summary  Goal: Goal Outcome Summary  Patient is alert and oriented.  Denies any pain.  States she is leaving in AM.  SPO2 89-90% on 3L. Lungs are diminished bilat.  Bowel sounds positive X4.  Pedal pulses palpable with no edema.  Patient is currently in bed resting.  Plan to continue to observe.     Patient has been in bed resting without any c/o distress.  Respiratory continues to assist patient with beds as needed. Plan to continue to observe.       0130: Patient in bed resting.  IV flushed, lungs auscultated rhonchi noted in left lower base.  Blood sugar glucose 125.  Patient is perspiring states she always does at night and relates it to her increase use of blankets.  Denies any feelings of unease, no headache, increased thirst or abdominal pain noted. Assisted patient with linen adjustment.  Patient currently resting.  Will continue to observe.     0400:  Patient has been resting comfortably all night with no c/o distress.  Lungs clear but diminished  in lower bases. No coughing noted at this time.  Plan to continue to observe.

## 2017-06-08 NOTE — PLAN OF CARE
Problem: Goal Outcome Summary  Goal: Goal Outcome Summary  Outcome: Adequate for Discharge Date Met:  06/08/17  VSS. O2 saturations 92% with 2lpm resting, 85% on room air.  Alert oriented. Denies pain.

## 2017-06-08 NOTE — PLAN OF CARE
Face to face report given with opportunity to observe patient.    Report given to TACHO Goldberg.     Izabel Barnes   6/8/2017  7:05 AM

## 2017-06-08 NOTE — PLAN OF CARE
Patient discharged at 1:43 PM via ambulation accompanied by daughter and staff. Prescriptions sent to patients preferred pharmacy. All belongings sent with patient.     Discharge instructions reviewed with patient and daughter. Listed belongings gathered and returned to patient. yes    Patient discharged to home.   Report called to n/a    Core Measures and Vaccines  Core Measures applicable during stay: Yes. If yes, state diagnosis: pneumonia  Pneumonia and Influenza given prior to discharge, if indicated: N/A    Surgical Patient   Surgical Procedures during stay: no  Did patient receive discharge instruction on wound care and recognition of infection symptoms? N/A    MISC  Follow up appointment made:  Yes  Home and hospital aquired medications returned to patient: Yes  Patient reports pain was well managed at discharge: Yes

## 2017-06-08 NOTE — PROGRESS NOTES
Name: Sheila Nuñez    MRN#: [id number]    Reason for Hospitalization: COPD exacerbation (H) [J44.1]    RAMONITA: low    Discharge Date: 6/8/2017    Patient / Family response to discharge plan: agreeable    Follow-Up Appt: Future Appointments  Date Time Provider Department Center   6/21/2017 10:20 AM David Littlejohn MD [facility]       Other Providers (Care Coordinator, County Services, PCA services etc): No    Discharge Disposition: home    Samaria Ji RN

## 2017-06-08 NOTE — PLAN OF CARE
Face to face report given with opportunity to observe patient.  Report given to TACHO Crowley.    Norma Marin  6/7/2017, 7:30 PM

## 2017-06-09 ENCOUNTER — TELEPHONE (OUTPATIENT)
Dept: CASE MANAGEMENT | Facility: HOSPITAL | Age: 72
End: 2017-06-09

## 2017-06-09 LAB
BACTERIA SPEC CULT: NORMAL
MICRO REPORT STATUS: NORMAL
SPECIMEN SOURCE: NORMAL

## 2017-06-13 ENCOUNTER — TELEPHONE (OUTPATIENT)
Dept: CASE MANAGEMENT | Facility: HOSPITAL | Age: 72
End: 2017-06-13

## 2017-06-21 ENCOUNTER — OFFICE VISIT (OUTPATIENT)
Dept: FAMILY MEDICINE | Facility: OTHER | Age: 72
End: 2017-06-21
Attending: FAMILY MEDICINE
Payer: COMMERCIAL

## 2017-06-21 VITALS
BODY MASS INDEX: 22.63 KG/M2 | HEIGHT: 62 IN | RESPIRATION RATE: 20 BRPM | HEART RATE: 80 BPM | WEIGHT: 123 LBS | DIASTOLIC BLOOD PRESSURE: 78 MMHG | SYSTOLIC BLOOD PRESSURE: 148 MMHG | TEMPERATURE: 98.5 F | OXYGEN SATURATION: 90 %

## 2017-06-21 DIAGNOSIS — Z72.0 TOBACCO ABUSE: ICD-10-CM

## 2017-06-21 DIAGNOSIS — J43.1 PANLOBULAR EMPHYSEMA (H): Primary | ICD-10-CM

## 2017-06-21 PROCEDURE — 99214 OFFICE O/P EST MOD 30 MIN: CPT | Performed by: FAMILY MEDICINE

## 2017-06-21 ASSESSMENT — PAIN SCALES - GENERAL: PAINLEVEL: NO PAIN (0)

## 2017-06-21 NOTE — NURSING NOTE
"Chief Complaint   Patient presents with     Hospital F/U     hospitalized on 6/7/2017 for COPD... Feels better       Initial /78 (BP Location: Left arm, Patient Position: Chair, Cuff Size: Adult Regular)  Pulse 80  Temp 98.5  F (36.9  C) (Tympanic)  Resp 20  Ht 5' 2\" (1.575 m)  Wt 123 lb (55.8 kg)  SpO2 90%  BMI 22.5 kg/m2 Estimated body mass index is 22.5 kg/(m^2) as calculated from the following:    Height as of this encounter: 5' 2\" (1.575 m).    Weight as of this encounter: 123 lb (55.8 kg).  Medication Reconciliation: complete   Celia Montenegro    "

## 2017-06-21 NOTE — PROGRESS NOTES
SUBJECTIVE:  Sheila Nuñez, 71 year old, female is seen in hospital follow up.  She developed worsening dyspnea and was seen in the ER and subsequently admitted with acute hypoxemic respiratory failure was present as well. There was no evidence of a lower respiratory tract infection. Sheila has a prolonged history of tobacco abuse, likely greater than 100 pack year.  Chest radiograph showed hyperinflation with increased markings in the right lower lobe.  However, this had not changed compared to study in January.  She was discharged on levofloxacin and short acting bronchodilators.  It was recommended that she have pulmonary function tests to establish her baseline in another 4-6 weeks.  Sheila was also prescribed 2 L of oxygen by nasal cannula at time of discharge.  At the time of discharge while breathing room air at rest.  Oxyg. It was recommended use of this at least 18 hours per day.     Unfortunately she continues to smoke. She has quit multiple times in the past for up to 5 years.    Denies chest pain, dyspnea, dizzyness, nausea, vomiting, diaphoresis, palpitations, numbness, tingling, or paresthesias.    Current Outpatient Prescriptions   Medication Sig Dispense Refill     umeclidinium-vilanterol (ANORO ELLIPTA) 62.5-25 MCG/INH oral inhaler Inhale 1 puff into the lungs daily 1 Inhaler 11     Metoprolol Tartrate (LOPRESSOR PO) Take 25 mg by mouth every evening       Metoprolol Tartrate (LOPRESSOR PO) Take 50 mg by mouth every morning       lovastatin (MEVACOR) 20 MG tablet TAKE ONE TABLET BY MOUTH ONCE DAILY 90 tablet 1     clopidogrel (PLAVIX) 75 MG tablet TAKE ONE TABLET BY MOUTH ONCE DAILY 90 tablet 0     FLAXSEED 2,000 mg 2 times daily           Allergies   Allergen Reactions     Ciprofloxacin Other (See Comments)     Cipro  intolerance     Influenza Vaccines      Influenza virus vacc. Specific       Morphine Other (See Comments)     Becomes very mean     Nitrofurantoin Other (See Comments)      Macrobid  intolerance     Nitrofurantoin Macrocrystal Other (See Comments)     Macrobid  Intolerance         Past Medical History:   Diagnosis Date     Asthma 1/3/2012     CHD (coronary heart disease) 7/27/2011     Dyslipidemia 10/06/2011     Tobacco abuse 7/27/2011     Past Surgical History:   Procedure Laterality Date     ANGIOGRAM       stents       Family History   Problem Relation Age of Onset     C.A.D. Father 63     cause of death     C.A.D. Mother 78     cause of death     Social History     Social History     Marital status:      Spouse name: N/A     Number of children: N/A     Years of education: N/A     Occupational History     Not on file.     Social History Main Topics     Smoking status: Current Every Day Smoker     Packs/day: 1.00     Years: 20.00     Types: Cigarettes     Last attempt to quit: 8/30/2015     Smokeless tobacco: Never Used      Comment: no passive smoke exposure     Alcohol use No     Drug use: No     Sexual activity: Not on file     Other Topics Concern     Caffeine Concern Yes     coffee > 6 cups daily     Social History Narrative         Review Of Systems  Constitutional, HEENT, cardiovascular, pulmonary, gi and gu systems are negative, except as otherwise noted.    OBJECTIVE:  Vitals: B/P: 148/78, T: 98.5, P: 80, R: 20    Exam:  Physical Exam   Constitutional: She is oriented to person, place, and time. She appears well-developed and well-nourished. No distress.   HENT:   Head: Normocephalic.   Right Ear: Hearing, tympanic membrane, external ear and ear canal normal.   Left Ear: Hearing, tympanic membrane, external ear and ear canal normal.   Nose: Nose normal. Right sinus exhibits no maxillary sinus tenderness and no frontal sinus tenderness. Left sinus exhibits no maxillary sinus tenderness and no frontal sinus tenderness.   Mouth/Throat: Uvula is midline and oropharynx is clear and moist. No oropharyngeal exudate or posterior oropharyngeal erythema.   Eyes: Conjunctivae  are normal.   Neck: Neck supple.   Cardiovascular: Normal rate and regular rhythm.    Pulmonary/Chest: Effort normal.   Markedly diminished breath sounds throughout.  She is doing pursed lip breathing   Lymphadenopathy:     She has no cervical adenopathy.   Neurological: She is alert and oriented to person, place, and time.   Skin: Skin is warm and dry.   Psychiatric: She has a normal mood and affect.     Other exam not repeated    Labs:  Admission on 06/07/2017, Discharged on 06/08/2017   Component Date Value Ref Range Status     WBC 06/07/2017 12.5* 4.0 - 11.0 10e9/L Final     RBC Count 06/07/2017 4.82  3.8 - 5.2 10e12/L Final     Hemoglobin 06/07/2017 15.2  11.7 - 15.7 g/dL Final     Hematocrit 06/07/2017 42.1  35.0 - 47.0 % Final     MCV 06/07/2017 87  78 - 100 fl Final     MCH 06/07/2017 31.5  26.5 - 33.0 pg Final     MCHC 06/07/2017 36.1  31.5 - 36.5 g/dL Final     RDW 06/07/2017 12.6  10.0 - 15.0 % Final     Platelet Count 06/07/2017 209  150 - 450 10e9/L Final     Diff Method 06/07/2017 Automated Method   Final     % Neutrophils 06/07/2017 84.1  % Final     % Lymphocytes 06/07/2017 7.6  % Final     % Monocytes 06/07/2017 7.8  % Final     % Eosinophils 06/07/2017 0.0  % Final     % Basophils 06/07/2017 0.3  % Final     % Immature Granulocytes 06/07/2017 0.2  % Final     Nucleated RBCs 06/07/2017 0  0 /100 Final     Absolute Neutrophil 06/07/2017 10.5* 1.6 - 8.3 10e9/L Final     Absolute Lymphocytes 06/07/2017 1.0  0.8 - 5.3 10e9/L Final     Absolute Monocytes 06/07/2017 1.0  0.0 - 1.3 10e9/L Final     Absolute Eosinophils 06/07/2017 0.0  0.0 - 0.7 10e9/L Final     Absolute Basophils 06/07/2017 0.0  0.0 - 0.2 10e9/L Final     Abs Immature Granulocytes 06/07/2017 0.0  0 - 0.4 10e9/L Final     Absolute Nucleated RBC 06/07/2017 0.0   Final     Sodium 06/07/2017 133  133 - 144 mmol/L Final     Potassium 06/07/2017 3.5  3.4 - 5.3 mmol/L Final     Chloride 06/07/2017 99  94 - 109 mmol/L Final     Carbon Dioxide  06/07/2017 25  20 - 32 mmol/L Final     Anion Gap 06/07/2017 9  3 - 14 mmol/L Final     Glucose 06/07/2017 124* 70 - 99 mg/dL Final     Urea Nitrogen 06/07/2017 11  7 - 30 mg/dL Final     Creatinine 06/07/2017 0.71  0.52 - 1.04 mg/dL Final     GFR Estimate 06/07/2017 81  >60 mL/min/1.7m2 Final    Non  GFR Calc     GFR Estimate If Black 06/07/2017   >60 mL/min/1.7m2 Final                    Value:>90   GFR Calc       Calcium 06/07/2017 8.7  8.5 - 10.1 mg/dL Final     Specimen Description 06/07/2017 Nares   Final     Culture Micro 06/07/2017 No MRSA isolated   Final     Micro Report Status 06/07/2017 FINAL 06/09/2017   Final     Glucose 06/08/2017 125* 70 - 99 mg/dL Final       ASSESSMENT/PLAN:  COPD (chronic obstructive pulmonary disease) (H)  Reviewed hospital records.  Discussed need for oxygen and continued follow up.  She will continue same medication regimen.  Await formal PFTs.  Follow up one month.    Tobacco abuse  Discussed the importance of smoking cessation.  Nonpharmacologic measures discussed.            David Littlejohn MD

## 2017-06-21 NOTE — MR AVS SNAPSHOT
After Visit Summary   6/21/2017    Sheila Nuñez    MRN: [id number]           Patient Information     Date Of Birth          1945        Visit Information        Provider Department      6/21/2017 10:20 AM David Littlejohn MD [facility] [place]        Today's Diagnoses     COPD (chronic obstructive pulmonary disease) (H)    -  1    Tobacco abuse          Care Instructions    Continue same medications.            Follow-ups after your visit        Additional Services     CALL IT QUITS (QUITPLAN) REFERRAL       [place] TOBACCO QUITLINES FAX FORM  Fax form to: [phone]    The clinic will facilitate the referral to the quitline.    Provider Information:  ===============================================================  David Littlejohn MD  ID#: [id number] - [facility] [place] [phone] Fax: [phone]   [email or web address]  Payor: BCBS / Plan: BCBS OUT OF STATE / Product Type: Indemnity /   ===============================================================    The Public Health Service Guideline does not recommend providing over-the-counter nicotine replacement therapy products without physician authorization to patients with the following conditions: pregnancy, uncontrolled high blood pressure, or cardiovascular diseases.     I authorize the [place] Tobacco Quitlines to provide over-the-counter nicotine replacement products for the patient listed below if the patient's health plan benefits cover NRT or if the patient is eligible for QUITPLAN services.    Patient Consented to:  ===============================================================  - YES - I am ready to quit tobacco and request the above information be given to the quitline so they may contact me.  I understand that one of [place]'s Tobacco Quitlines will inform my provider about my  participation.  ===============================================================  Please check the BEST 3-hour call window for them to reach you: 5pm - 8pm  May we leave a message?  YES  Language Preference:  English  Phone Number: Home Phone      [phone]  Mobile          [phone]     E-mail Address: No e-mail address on record    ========================================================================  FOR QUITLINE USE ONLY:  THIS INFORMATION WILL BE PROVIDED BACK TO THE PROVIDER  Contact date: __/ __/__ or ____ Did not reach after three attempts.    Outcome:  __ Enrolled in telephone counseling program  __ Declined  __ Not Reached    Stage of readiness: _______________________  Planned Quit Date: ___/ ___/ ___  Comments:      2011 [facility]   This message funded by [employer] [place], an independent licensee of the Blue Cross and [employer]. Rev. 11/1/12                  Follow-up notes from your care team     Return in about 1 month (around 7/21/2017) for Follow Up Visit.      Your next 10 appointments already scheduled     Jul 06, 2017 12:15 PM CDT   Pulmonary Function with [facility]   [facility] ([facility] )    [address] [id number] [phone]           No Inhalers for 6 hours prior to test No Smoking 2 hours prior to test            Jul 19, 2017 10:40 AM CDT   (Arrive by 10:25 AM)   SHORT with David Littlejohn MD   [facility] ([facility] )    [address]  [facility] [address]   [phone]              Who to contact     If you have questions or need follow up information about today's clinic visit or your schedule please contact [facility] directly at [phone].  Normal or non-critical lab and imaging results will be communicated to you by MyChart, letter or phone within 4 business days after the clinic has received the results. If you do  "not hear from us within 7 days, please contact the clinic through [employer] or phone. If you have a critical or abnormal lab result, we will notify you by phone as soon as possible.  Submit refill requests through [employer] or call your pharmacy and they will forward the refill request to us. Please allow 3 business days for your refill to be completed.          Additional Information About Your Visit        [employer] Information     [employer] lets you send messages to your doctor, view your test results, renew your prescriptions, schedule appointments and more. To sign up, go to [email or web address] . Click on \"Log in\" on the left side of the screen, which will take you to the Welcome page. Then click on \"Sign up Now\" on the right side of the page.     You will be asked to enter the access code listed below, as well as some personal information. Please follow the directions to create your username and password.     Your access code is: [email or web address]  Expires: 2017 12:18 PM     Your access code will  in 90 days. If you need help or a new code, please call your [place] clinic or [phone].        Care EveryWhere ID     This is your Care EveryWhere ID. This could be used by other organizations to access your [place] medical records  [id number]        Your Vitals Were     Pulse Temperature Respirations Height Pulse Oximetry BMI (Body Mass Index)    80 98.5  F (36.9  C) (Tympanic) 20 5' 2\" (1.575 m) 90% 22.5 kg/m2       Blood Pressure from Last 3 Encounters:   17 148/78   17 157/65   17 160/60    Weight from Last 3 Encounters:   17 123 lb (55.8 kg)   17 121 lb 11.1 oz (55.2 kg)   17 123 lb 9.6 oz (56.1 kg)              We Performed the Following     CALL IT QUITS (QUITPLAN) REFERRAL          Today's Medication Changes          These changes are accurate as of: 17 11:59 PM.  If you have any questions, ask your nurse or doctor.               Stop taking these medicines " if you haven't already. Please contact your care team if you have questions.     predniSONE 10 MG tablet   Commonly known as:  DELTASONE   Stopped by:  David Littlejohn MD                    Primary Care Provider Office Phone # Fax #    David Littlejohn [phone]       [facility] [address] [place] [address]        Equal Access to Services     [facility]: Hadii aad ku hadasho Soomaali, waaxda luqadaha, qaybta kaalmada adeegyada, waxay idiin hayaan adeeg kharash la'aan ah. So [facility] [phone].    ATENCIÓN: Si habla espheather, tiene a villalobos disposición servicios gratuitos de asistencia lingüística. Sukhjinder al [phone].    We comply with applicable federal civil rights laws and [place] laws. We do not discriminate on the basis of race, color, national origin, age, disability sex, sexual orientation or gender identity.            Thank you!     Thank you for choosing [facility]  for your care. Our goal is always to provide you with excellent care. Hearing back from our patients is one way we can continue to improve our services. Please take a few minutes to complete the written survey that you may receive in the mail after your visit with us. Thank you!             Your Updated Medication List - Protect others around you: Learn how to safely use, store and throw away your medicines at www.disposemymeds.org.          This list is accurate as of: 6/21/17 11:59 PM.  Always use your most recent med list.                   Brand Name Dispense Instructions for use Diagnosis    clopidogrel 75 MG tablet    PLAVIX    90 tablet    TAKE ONE TABLET BY MOUTH ONCE DAILY    Coronary artery disease involving coronary bypass graft of native heart without angina pectoris       FLAXSEED      2,000 mg 2 times daily        * LOPRESSOR PO      Take 25 mg by mouth every evening        * LOPRESSOR PO      Take 50 mg by mouth every morning        lovastatin 20 MG tablet    MEVACOR    90 tablet     TAKE ONE TABLET BY MOUTH ONCE DAILY    Mixed hyperlipidemia       umeclidinium-vilanterol 62.5-25 MCG/INH oral inhaler    ANORO ELLIPTA    1 Inhaler    Inhale 1 puff into the lungs daily    COPD exacerbation (H)       * Notice:  This list has 2 medication(s) that are the same as other medications prescribed for you. Read the directions carefully, and ask your doctor or other care provider to review them with you.

## 2017-07-15 DIAGNOSIS — I25.810 CORONARY ARTERY DISEASE INVOLVING CORONARY BYPASS GRAFT OF NATIVE HEART WITHOUT ANGINA PECTORIS: ICD-10-CM

## 2017-07-18 RX ORDER — CLOPIDOGREL BISULFATE 75 MG/1
TABLET ORAL
Qty: 90 TABLET | Refills: 1 | Status: SHIPPED | OUTPATIENT
Start: 2017-07-18 | End: 2018-01-17

## 2017-07-18 NOTE — TELEPHONE ENCOUNTER
Plavix            Last Written Prescription Date: 4/6/17  Last Fill Quantity: 90, # refills: 0    Last Office Visit with G, P or  Health prescribing provider:  6/21/17   Future Office Visit:    Next 5 appointments (look out 90 days)     Jul 19, 2017 10:40 AM CDT   (Arrive by 10:25 AM)   SHORT with David Littlejohn MD   [facility] [place] ([facility] [place] )    [address]  [place] [address]   [phone]                   Lab Results   Component Value Date    WBC 12.5 06/07/2017     Lab Results   Component Value Date    RBC 4.82 06/07/2017     Lab Results   Component Value Date    HGB 15.2 06/07/2017     Lab Results   Component Value Date    HCT 42.1 06/07/2017     No components found for: MCT  Lab Results   Component Value Date    MCV 87 06/07/2017     Lab Results   Component Value Date    MCH 31.5 06/07/2017     Lab Results   Component Value Date    MCHC 36.1 06/07/2017     Lab Results   Component Value Date    RDW 12.6 06/07/2017     Lab Results   Component Value Date     06/07/2017     Lab Results   Component Value Date    AST 12 09/02/2015     Lab Results   Component Value Date    ALT 14 09/02/2015     Creatinine   Date Value Ref Range Status   06/07/2017 0.71 0.52 - 1.04 mg/dL Final   ]

## 2017-09-27 ENCOUNTER — TELEPHONE (OUTPATIENT)
Dept: FAMILY MEDICINE | Facility: OTHER | Age: 72
End: 2017-09-27

## 2017-09-27 ENCOUNTER — OFFICE VISIT (OUTPATIENT)
Dept: FAMILY MEDICINE | Facility: OTHER | Age: 72
End: 2017-09-27
Attending: FAMILY MEDICINE
Payer: COMMERCIAL

## 2017-09-27 VITALS
TEMPERATURE: 97.7 F | SYSTOLIC BLOOD PRESSURE: 180 MMHG | WEIGHT: 120 LBS | HEART RATE: 71 BPM | HEIGHT: 62 IN | RESPIRATION RATE: 20 BRPM | BODY MASS INDEX: 22.08 KG/M2 | DIASTOLIC BLOOD PRESSURE: 72 MMHG | OXYGEN SATURATION: 91 %

## 2017-09-27 DIAGNOSIS — J43.1 PANLOBULAR EMPHYSEMA (H): Primary | ICD-10-CM

## 2017-09-27 PROCEDURE — 99214 OFFICE O/P EST MOD 30 MIN: CPT | Performed by: FAMILY MEDICINE

## 2017-09-27 ASSESSMENT — PAIN SCALES - GENERAL: PAINLEVEL: NO PAIN (0)

## 2017-09-27 NOTE — NURSING NOTE
"Chief Complaint   Patient presents with     discuss oxygen orders     want oxygen to be discontinued       Initial /72 (BP Location: Left arm, Patient Position: Sitting, Cuff Size: Adult Regular)  Pulse 71  Temp 97.7  F (36.5  C) (Tympanic)  Resp 20  Ht 5' 2\" (1.575 m)  Wt 120 lb (54.4 kg)  SpO2 91%  BMI 21.95 kg/m2 Estimated body mass index is 21.95 kg/(m^2) as calculated from the following:    Height as of this encounter: 5' 2\" (1.575 m).    Weight as of this encounter: 120 lb (54.4 kg).  Medication Reconciliation: complete   Nery Rawls LPN      "

## 2017-09-27 NOTE — TELEPHONE ENCOUNTER
STAR Trent from [facility] called. Patient has insisted on bringing back concentrator. [facility] does not do separate oxygen tanks and her insurance wouldn't cover that anyway. [facility] had her sign an AMA for returning concentrator with orders for O2 PRN.

## 2017-09-27 NOTE — TELEPHONE ENCOUNTER
Miley, [facility] staff, called stating patient brought in a prescription for O2 PRN, and would like to return her concentrator. Please call Miley back at [phone] to discuss this. Thank you.

## 2017-09-27 NOTE — MR AVS SNAPSHOT
"              After Visit Summary   9/27/2017    Sheila Nuñez    MRN: [id number]           Patient Information     Date Of Birth          1945        Visit Information        Provider Department      9/27/2017 2:20 PM David Littlejohn MD [facility]        Today's Diagnoses     COPD (chronic obstructive pulmonary disease) (H)    -  1      Care Instructions    Continue same medications.            Follow-ups after your visit        Follow-up notes from your care team     Return in about 3 months (around 12/27/2017) for Follow Up Visit.      Who to contact     If you have questions or need follow up information about today's clinic visit or your schedule please contact [facility] directly at [phone].  Normal or non-critical lab and imaging results will be communicated to you by MyChart, letter or phone within 4 business days after the clinic has received the results. If you do not hear from us within 7 days, please contact the clinic through MyChart or phone. If you have a critical or abnormal lab result, we will notify you by phone as soon as possible.  Submit refill requests through [employer] or call your pharmacy and they will forward the refill request to us. Please allow 3 business days for your refill to be completed.          Additional Information About Your Visit        MyChart Information     [employer] lets you send messages to your doctor, view your test results, renew your prescriptions, schedule appointments and more. To sign up, go to [email or web address] . Click on \"Log in\" on the left side of the screen, which will take you to the Welcome page. Then click on \"Sign up Now\" on the right side of the page.     You will be asked to enter the access code listed below, as well as some personal information. Please follow the directions to create your username and password.     Your access code is: [email or web address]  Expires: 12/30/2017  4:59 PM     Your access code will " " in 90 days. If you need help or a new code, please call your [facility] or [phone].        Care EveryWhere ID     This is your Care EveryWhere ID. This could be used by other organizations to access your [place] medical records  [id number]        Your Vitals Were     Pulse Temperature Respirations Height Pulse Oximetry BMI (Body Mass Index)    71 97.7  F (36.5  C) (Tympanic) 20 5' 2\" (1.575 m) 91% 21.95 kg/m2       Blood Pressure from Last 3 Encounters:   17 180/72   17 148/78   17 157/65    Weight from Last 3 Encounters:   17 120 lb (54.4 kg)   17 123 lb (55.8 kg)   17 121 lb 11.1 oz (55.2 kg)              Today, you had the following     No orders found for display         Today's Medication Changes          These changes are accurate as of: 17 11:59 PM.  If you have any questions, ask your nurse or doctor.               Start taking these medicines.        Dose/Directions    Ipratropium-Albuterol  MCG/ACT inhaler   Commonly known as:  COMBIVENT RESPIMAT   Used for:  Panlobular emphysema (H)   Started by:  David Littlejohn MD        Dose:  1 puff   Inhale 1 puff into the lungs 4 times daily Not to exceed 6 doses per day.   Quantity:  3 Inhaler   Refills:  3       order for DME   Used for:  Panlobular emphysema (H)   Started by:  David Littlejohn MD        Oxygen 2L via nasal cannula PRN dyspnea, hypoxia   Quantity:  1 Device   Refills:  0            Where to get your medicines      These medications were sent to [id number] [address] [place] [address] [address]  [address] , [place] [address]     Phone:  [phone]     Ipratropium-Albuterol  MCG/ACT inhaler         Some of these will need a paper prescription and others can be bought over the counter.  Ask your nurse if you have questions.     Bring a paper prescription for each of these medications     order for DME                Primary Care Provider Office Phone # Fax #    David " MD [place] [phone]       [facility] [address]  [facility] [address]        Equal Access to Services     DRAGAN TERRAZAS : Hadii aad ku hadidaliamel Joyacl, wachinyereda andreapascualha, naderta kaaryanda lizzy, nikos ishain hayaan marilouchris beckham lajayeshjagdish deal. So [facility] [phone].    ATENCIÓN: Si habla español, tiene a villalobos disposición servicios gratuitos de asistencia lingüística. Llame al [phone].    We comply with applicable federal civil rights laws and [place] laws. We do not discriminate on the basis of race, color, national origin, age, disability, sex, sexual orientation, or gender identity.            Thank you!     Thank you for choosing [facility]  for your care. Our goal is always to provide you with excellent care. Hearing back from our patients is one way we can continue to improve our services. Please take a few minutes to complete the written survey that you may receive in the mail after your visit with us. Thank you!             Your Updated Medication List - Protect others around you: Learn how to safely use, store and throw away your medicines at www.disposemymeds.org.          This list is accurate as of: 9/27/17 11:59 PM.  Always use your most recent med list.                   Brand Name Dispense Instructions for use Diagnosis    clopidogrel 75 MG tablet    PLAVIX    90 tablet    TAKE ONE TABLET BY MOUTH ONCE DAILY    Coronary artery disease involving coronary bypass graft of native heart without angina pectoris       FLAXSEED      2,000 mg 2 times daily        Ipratropium-Albuterol  MCG/ACT inhaler    COMBIVENT RESPIMAT    3 Inhaler    Inhale 1 puff into the lungs 4 times daily Not to exceed 6 doses per day.    Panlobular emphysema (H)       * LOPRESSOR PO      Take 25 mg by mouth every evening        * LOPRESSOR PO      Take 50 mg by mouth every morning        lovastatin 20 MG tablet    MEVACOR    90 tablet    TAKE ONE TABLET BY MOUTH ONCE DAILY    Mixed  hyperlipidemia       order for DME     1 Device    Oxygen 2L via nasal cannula PRN dyspnea, hypoxia    Panlobular emphysema (H)       * Notice:  This list has 2 medication(s) that are the same as other medications prescribed for you. Read the directions carefully, and ask your doctor or other care provider to review them with you.

## 2017-09-27 NOTE — PROGRESS NOTES
SUBJECTIVE:  Sheila Nuñez, 72 year old, female is seen with the following medical problems.    COPD.  Sheila has a history of moderate-severe COPD and currently not oxygen dependent. She was hospitalized with exacerbation and was treated with oxygen, steroids, and frequent nebulization . She remains on bronchodilatory therapy and has been tolerating them well.  Most recent PFTs were ordered prior and have not been performed..  No cough, sputum production, dyspnea, or wheezing. Sheila has been on home oxygen and her saturations are above 90 without this.  She uses this only as needed and wishes to have the concentrator removed.    Current Outpatient Prescriptions   Medication Sig Dispense Refill     clopidogrel (PLAVIX) 75 MG tablet TAKE ONE TABLET BY MOUTH ONCE DAILY 90 tablet 1     Metoprolol Tartrate (LOPRESSOR PO) Take 25 mg by mouth every evening       Metoprolol Tartrate (LOPRESSOR PO) Take 50 mg by mouth every morning       lovastatin (MEVACOR) 20 MG tablet TAKE ONE TABLET BY MOUTH ONCE DAILY 90 tablet 1     FLAXSEED 2,000 mg 2 times daily           Allergies   Allergen Reactions     Ciprofloxacin Other (See Comments)     Cipro  intolerance     Influenza Vaccines      Influenza virus vacc. Specific       Morphine Other (See Comments)     Becomes very mean     Nitrofurantoin Other (See Comments)     Macrobid  intolerance     Nitrofurantoin Macrocrystal Other (See Comments)     Macrobid  Intolerance         Past Medical History:   Diagnosis Date     Asthma 1/3/2012     CHD (coronary heart disease) 7/27/2011     Dyslipidemia 10/06/2011     Tobacco abuse 7/27/2011     Past Surgical History:   Procedure Laterality Date     ANGIOGRAM       stents       Family History   Problem Relation Age of Onset     C.A.D. Father 63     cause of death     C.A.D. Mother 78     cause of death     Social History     Social History     Marital status:      Spouse name: N/A     Number of children: N/A     Years of  "education: N/A     Occupational History     Not on file.     Social History Main Topics     Smoking status: Current Every Day Smoker     Packs/day: 0.25     Years: 20.00     Types: Cigarettes     Last attempt to quit: 8/30/2015     Smokeless tobacco: Never Used      Comment: does not want quit plan. working on quitting now.     Alcohol use No     Drug use: No     Sexual activity: Not on file     Other Topics Concern     Caffeine Concern Yes     coffee > 6 cups daily     Social History Narrative         Review Of Systems  Constitutional, HEENT, cardiovascular, pulmonary, gi and gu systems are negative, except as otherwise noted.      OBJECTIVE:/72 (BP Location: Left arm, Patient Position: Sitting, Cuff Size: Adult Regular)  Pulse 71  Temp 97.7  F (36.5  C) (Tympanic)  Resp 20  Ht 5' 2\" (1.575 m)  Wt 120 lb (54.4 kg)  SpO2 91%  BMI 21.95 kg/m2      Exam:  Physical Exam   Constitutional: She is oriented to person, place, and time. She appears well-developed and well-nourished. No distress.   HENT:   Head: Normocephalic.   Right Ear: Hearing, tympanic membrane, external ear and ear canal normal.   Left Ear: Hearing, tympanic membrane, external ear and ear canal normal.   Nose: Nose normal. Right sinus exhibits no maxillary sinus tenderness and no frontal sinus tenderness. Left sinus exhibits no maxillary sinus tenderness and no frontal sinus tenderness.   Mouth/Throat: Uvula is midline and oropharynx is clear and moist. No oropharyngeal exudate or posterior oropharyngeal erythema.   Eyes: Conjunctivae are normal.   Neck: Neck supple.   Pulmonary/Chest: Effort normal.   Markedly diminished breath sounds.   Lymphadenopathy:     She has no cervical adenopathy.   Neurological: She is alert and oriented to person, place, and time.   Skin: Skin is warm and dry.   Psychiatric: She has a normal mood and affect.     Other exam not repeated    Labs:  Admission on 06/07/2017, Discharged on 06/08/2017   Component Date " Value Ref Range Status     WBC 06/07/2017 12.5* 4.0 - 11.0 10e9/L Final     RBC Count 06/07/2017 4.82  3.8 - 5.2 10e12/L Final     Hemoglobin 06/07/2017 15.2  11.7 - 15.7 g/dL Final     Hematocrit 06/07/2017 42.1  35.0 - 47.0 % Final     MCV 06/07/2017 87  78 - 100 fl Final     MCH 06/07/2017 31.5  26.5 - 33.0 pg Final     MCHC 06/07/2017 36.1  31.5 - 36.5 g/dL Final     RDW 06/07/2017 12.6  10.0 - 15.0 % Final     Platelet Count 06/07/2017 209  150 - 450 10e9/L Final     Diff Method 06/07/2017 Automated Method   Final     % Neutrophils 06/07/2017 84.1  % Final     % Lymphocytes 06/07/2017 7.6  % Final     % Monocytes 06/07/2017 7.8  % Final     % Eosinophils 06/07/2017 0.0  % Final     % Basophils 06/07/2017 0.3  % Final     % Immature Granulocytes 06/07/2017 0.2  % Final     Nucleated RBCs 06/07/2017 0  0 /100 Final     Absolute Neutrophil 06/07/2017 10.5* 1.6 - 8.3 10e9/L Final     Absolute Lymphocytes 06/07/2017 1.0  0.8 - 5.3 10e9/L Final     Absolute Monocytes 06/07/2017 1.0  0.0 - 1.3 10e9/L Final     Absolute Eosinophils 06/07/2017 0.0  0.0 - 0.7 10e9/L Final     Absolute Basophils 06/07/2017 0.0  0.0 - 0.2 10e9/L Final     Abs Immature Granulocytes 06/07/2017 0.0  0 - 0.4 10e9/L Final     Absolute Nucleated RBC 06/07/2017 0.0   Final     Sodium 06/07/2017 133  133 - 144 mmol/L Final     Potassium 06/07/2017 3.5  3.4 - 5.3 mmol/L Final     Chloride 06/07/2017 99  94 - 109 mmol/L Final     Carbon Dioxide 06/07/2017 25  20 - 32 mmol/L Final     Anion Gap 06/07/2017 9  3 - 14 mmol/L Final     Glucose 06/07/2017 124* 70 - 99 mg/dL Final     Urea Nitrogen 06/07/2017 11  7 - 30 mg/dL Final     Creatinine 06/07/2017 0.71  0.52 - 1.04 mg/dL Final     GFR Estimate 06/07/2017 81  >60 mL/min/1.7m2 Final    Non  GFR Calc     GFR Estimate If Black 06/07/2017   >60 mL/min/1.7m2 Final                    Value:>90   GFR Calc       Calcium 06/07/2017 8.7  8.5 - 10.1 mg/dL Final     Specimen  Description 06/07/2017 Nares   Final     Culture Micro 06/07/2017 No MRSA isolated   Final     Micro Report Status 06/07/2017 FINAL 06/09/2017   Final     Glucose 06/08/2017 125* 70 - 99 mg/dL Final       ASSESSMENT/PLAN:  COPD (chronic obstructive pulmonary disease) (H)  Continue home nebs as needed.  Restart Combivent as she states this is more effective.  Recommend formal PFTs.  Follow up 3 months.  - Ipratropium-Albuterol (COMBIVENT RESPIMAT)  MCG/ACT inhaler; Inhale 1 puff into the lungs 4 times daily Not to exceed 6 doses per day.  - order for DME; Oxygen 2L via nasal cannula PRN dyspnea, hypoxia            David Littlejohn MD

## 2017-12-09 DIAGNOSIS — E78.2 MIXED HYPERLIPIDEMIA: ICD-10-CM

## 2017-12-11 NOTE — TELEPHONE ENCOUNTER
Lovastatin      Last Written Prescription Date: 6/2/17  Last Fill Quantity: 90,  # refills: 1   Last Office Visit with [facility], [facility] or [facility] prescribing provider: 9/27/17

## 2017-12-12 RX ORDER — LOVASTATIN 20 MG
TABLET ORAL
Qty: 30 TABLET | Refills: 0 | Status: SHIPPED | OUTPATIENT
Start: 2017-12-12 | End: 2018-01-17

## 2018-01-17 DIAGNOSIS — E78.2 MIXED HYPERLIPIDEMIA: ICD-10-CM

## 2018-01-17 DIAGNOSIS — I25.810 CORONARY ARTERY DISEASE INVOLVING CORONARY BYPASS GRAFT OF NATIVE HEART WITHOUT ANGINA PECTORIS: ICD-10-CM

## 2018-01-18 RX ORDER — CLOPIDOGREL BISULFATE 75 MG/1
TABLET ORAL
Qty: 90 TABLET | Refills: 0 | Status: SHIPPED | OUTPATIENT
Start: 2018-01-18 | End: 2018-04-11

## 2018-01-18 NOTE — TELEPHONE ENCOUNTER
lovastatin      Last Written Prescription Date:  12/12/17  Last Fill Quantity: 30,   # refills: 0  Last Office Visit: 9/27/17  Future Office visit: none

## 2018-01-18 NOTE — TELEPHONE ENCOUNTER
plavix      Last Written Prescription Date:  7/18/17  Last Fill Quantity: 90,   # refills: 1  Last Office Visit: 9/27/17  Future Office visit:  none

## 2018-01-19 RX ORDER — LOVASTATIN 20 MG
TABLET ORAL
Qty: 30 TABLET | Refills: 0 | Status: SHIPPED | OUTPATIENT
Start: 2018-01-19 | End: 2018-02-27

## 2018-02-27 DIAGNOSIS — E78.2 MIXED HYPERLIPIDEMIA: ICD-10-CM

## 2018-02-28 RX ORDER — LOVASTATIN 20 MG
TABLET ORAL
Qty: 90 TABLET | Refills: 0 | Status: SHIPPED | OUTPATIENT
Start: 2018-02-28 | End: 2018-05-23

## 2018-04-11 DIAGNOSIS — I25.810 CORONARY ARTERY DISEASE INVOLVING CORONARY BYPASS GRAFT OF NATIVE HEART WITHOUT ANGINA PECTORIS: ICD-10-CM

## 2018-04-11 RX ORDER — CLOPIDOGREL BISULFATE 75 MG/1
TABLET ORAL
Qty: 90 TABLET | Refills: 0 | Status: SHIPPED | OUTPATIENT
Start: 2018-04-11 | End: 2018-07-15

## 2018-04-25 DIAGNOSIS — I10 HTN (HYPERTENSION): ICD-10-CM

## 2018-04-26 RX ORDER — METOPROLOL TARTRATE 50 MG
TABLET ORAL
Qty: 60 TABLET | Refills: 1 | Status: SHIPPED | OUTPATIENT
Start: 2018-04-26 | End: 2018-07-15

## 2018-05-23 DIAGNOSIS — E78.2 MIXED HYPERLIPIDEMIA: ICD-10-CM

## 2018-05-23 NOTE — TELEPHONE ENCOUNTER
lovastatin      Last Written Prescription Date:  2/28/18  Last Fill Quantity: 90,   # refills: 0  Last Office Visit: 9/27/17  Future Office visit:  none

## 2018-05-24 RX ORDER — LOVASTATIN 20 MG
TABLET ORAL
Qty: 90 TABLET | Refills: 0 | Status: SHIPPED | OUTPATIENT
Start: 2018-05-24 | End: 2018-08-23

## 2018-05-24 NOTE — TELEPHONE ENCOUNTER
Please see note below for Lovastatin.  Patient due for labs.     LDL on file in past 12 months           Recent Labs   Lab Test  03/30/15   0857   LDL  116        Please advise.  Thank you.

## 2018-07-15 DIAGNOSIS — I25.810 CORONARY ARTERY DISEASE INVOLVING CORONARY BYPASS GRAFT OF NATIVE HEART WITHOUT ANGINA PECTORIS: ICD-10-CM

## 2018-07-15 DIAGNOSIS — I10 HTN (HYPERTENSION): ICD-10-CM

## 2018-07-16 RX ORDER — METOPROLOL TARTRATE 50 MG
TABLET ORAL
Qty: 60 TABLET | Refills: 1 | Status: SHIPPED | OUTPATIENT
Start: 2018-07-16 | End: 2018-09-05

## 2018-07-16 NOTE — TELEPHONE ENCOUNTER
metoprolol      Last Written Prescription Date:  4/26/18  Last Fill Quantity: 60,   # refills: 1  Last Office Visit: 9/27/17  Future Office visit: none      plavix      Last Written Prescription Date:  4/11/18  Last Fill Quantity: 90,   # refills: 0  Last Office Visit: 9/27/17

## 2018-07-16 NOTE — TELEPHONE ENCOUNTER
Plavix failing protocol due to being overdue for last Hgb.  Please advise. Thank you      Hemoglobin   Date Value Ref Range Status   06/07/2017 15.2 11.7 - 15.7 g/dL Final

## 2018-07-17 RX ORDER — CLOPIDOGREL BISULFATE 75 MG/1
TABLET ORAL
Qty: 90 TABLET | Refills: 0 | Status: SHIPPED | OUTPATIENT
Start: 2018-07-17 | End: 2018-10-13

## 2018-09-04 ENCOUNTER — TELEPHONE (OUTPATIENT)
Dept: FAMILY MEDICINE | Facility: OTHER | Age: 73
End: 2018-09-04

## 2018-09-04 NOTE — TELEPHONE ENCOUNTER
Please schedule patient for date/time: no available appointments. Please see another provider or go to UC/ER      Have patient go to ER/Urgent Care Center. Urgent Care hours are 9:30 am to 8 pm, open 7 days a week. Yes.    Provider will call patient.No.    Other:

## 2018-09-04 NOTE — TELEPHONE ENCOUNTER
9:16 AM    Reason for Call: OVERBOOK    Patient is having the following symptoms: possible pnemonia for 2 days.    The patient is requesting an appointment for 09/04/18 with .    Was an appointment offered for this call? No  If yes : Appointment type              Date    Preferred method for responding to this message: Telephone Call  What is your phone number ?[phone]    If we cannot reach you directly, may we leave a detailed response at the number you provided? Yes    Can this message wait until your PCP/provider returns, if unavailable today? Not applicable, pcp is in     [facility]

## 2018-09-05 ENCOUNTER — RADIANT APPOINTMENT (OUTPATIENT)
Dept: GENERAL RADIOLOGY | Facility: OTHER | Age: 73
End: 2018-09-05
Attending: PHYSICIAN ASSISTANT
Payer: COMMERCIAL

## 2018-09-05 ENCOUNTER — OFFICE VISIT (OUTPATIENT)
Dept: FAMILY MEDICINE | Facility: OTHER | Age: 73
End: 2018-09-05
Attending: PHYSICIAN ASSISTANT
Payer: COMMERCIAL

## 2018-09-05 VITALS
HEART RATE: 102 BPM | TEMPERATURE: 100.9 F | OXYGEN SATURATION: 89 % | RESPIRATION RATE: 26 BRPM | WEIGHT: 110 LBS | HEIGHT: 62 IN | SYSTOLIC BLOOD PRESSURE: 160 MMHG | BODY MASS INDEX: 20.24 KG/M2 | DIASTOLIC BLOOD PRESSURE: 70 MMHG

## 2018-09-05 DIAGNOSIS — R05.9 COUGH: ICD-10-CM

## 2018-09-05 DIAGNOSIS — J44.1 COPD EXACERBATION (H): Primary | ICD-10-CM

## 2018-09-05 DIAGNOSIS — J43.1 PANLOBULAR EMPHYSEMA (H): ICD-10-CM

## 2018-09-05 LAB
BASOPHILS # BLD AUTO: 0 10E9/L (ref 0–0.2)
BASOPHILS NFR BLD AUTO: 0.1 %
DIFFERENTIAL METHOD BLD: ABNORMAL
EOSINOPHIL # BLD AUTO: 0 10E9/L (ref 0–0.7)
EOSINOPHIL NFR BLD AUTO: 0.1 %
ERYTHROCYTE [DISTWIDTH] IN BLOOD BY AUTOMATED COUNT: 13.1 % (ref 10–15)
HCT VFR BLD AUTO: 43.6 % (ref 35–47)
HGB BLD-MCNC: 15.2 G/DL (ref 11.7–15.7)
LYMPHOCYTES # BLD AUTO: 1.7 10E9/L (ref 0.8–5.3)
LYMPHOCYTES NFR BLD AUTO: 10.7 %
MCH RBC QN AUTO: 31.1 PG (ref 26.5–33)
MCHC RBC AUTO-ENTMCNC: 34.9 G/DL (ref 31.5–36.5)
MCV RBC AUTO: 89 FL (ref 78–100)
MONOCYTES # BLD AUTO: 2 10E9/L (ref 0–1.3)
MONOCYTES NFR BLD AUTO: 12.5 %
NEUTROPHILS # BLD AUTO: 12 10E9/L (ref 1.6–8.3)
NEUTROPHILS NFR BLD AUTO: 76.6 %
PLATELET # BLD AUTO: 212 10E9/L (ref 150–450)
PLATELET # BLD EST: ABNORMAL 10*3/UL
RBC # BLD AUTO: 4.89 10E12/L (ref 3.8–5.2)
RBC MORPH BLD: NORMAL
WBC # BLD AUTO: 15.7 10E9/L (ref 4–11)

## 2018-09-05 PROCEDURE — 85025 COMPLETE CBC W/AUTO DIFF WBC: CPT | Performed by: PHYSICIAN ASSISTANT

## 2018-09-05 PROCEDURE — 36415 COLL VENOUS BLD VENIPUNCTURE: CPT | Performed by: PHYSICIAN ASSISTANT

## 2018-09-05 PROCEDURE — 71046 X-RAY EXAM CHEST 2 VIEWS: CPT | Mod: TC

## 2018-09-05 PROCEDURE — 99214 OFFICE O/P EST MOD 30 MIN: CPT | Performed by: PHYSICIAN ASSISTANT

## 2018-09-05 RX ORDER — ALBUTEROL SULFATE 0.83 MG/ML
2.5 SOLUTION RESPIRATORY (INHALATION) EVERY 4 HOURS PRN
Qty: 1 BOX | Refills: 11 | Status: SHIPPED | OUTPATIENT
Start: 2018-09-05 | End: 2022-02-07

## 2018-09-05 RX ORDER — PREDNISONE 10 MG/1
TABLET ORAL
Qty: 30 TABLET | Refills: 0 | Status: SHIPPED | OUTPATIENT
Start: 2018-09-05 | End: 2020-01-24

## 2018-09-05 RX ORDER — AZITHROMYCIN 250 MG/1
TABLET, FILM COATED ORAL
Qty: 6 TABLET | Refills: 0 | Status: SHIPPED | OUTPATIENT
Start: 2018-09-05 | End: 2018-09-10

## 2018-09-05 ASSESSMENT — ANXIETY QUESTIONNAIRES
6. BECOMING EASILY ANNOYED OR IRRITABLE: NOT AT ALL
2. NOT BEING ABLE TO STOP OR CONTROL WORRYING: NOT AT ALL
7. FEELING AFRAID AS IF SOMETHING AWFUL MIGHT HAPPEN: NOT AT ALL
GAD7 TOTAL SCORE: 0
1. FEELING NERVOUS, ANXIOUS, OR ON EDGE: NOT AT ALL
5. BEING SO RESTLESS THAT IT IS HARD TO SIT STILL: NOT AT ALL
3. WORRYING TOO MUCH ABOUT DIFFERENT THINGS: NOT AT ALL
IF YOU CHECKED OFF ANY PROBLEMS ON THIS QUESTIONNAIRE, HOW DIFFICULT HAVE THESE PROBLEMS MADE IT FOR YOU TO DO YOUR WORK, TAKE CARE OF THINGS AT HOME, OR GET ALONG WITH OTHER PEOPLE: NOT DIFFICULT AT ALL

## 2018-09-05 ASSESSMENT — PATIENT HEALTH QUESTIONNAIRE - PHQ9: 5. POOR APPETITE OR OVEREATING: NOT AT ALL

## 2018-09-05 ASSESSMENT — PAIN SCALES - GENERAL: PAINLEVEL: SEVERE PAIN (6)

## 2018-09-05 NOTE — MR AVS SNAPSHOT
"              After Visit Summary   9/5/2018    Sheila Nuñez    MRN: [id number]           Patient Information     Date Of Birth          1945        Visit Information        Provider Department      9/5/2018 1:45 PM Joan Garnica PA [facility]        Today's Diagnoses     COPD exacerbation (H)    -  1    COPD (chronic obstructive pulmonary disease) (H)        Cough           Follow-ups after your visit        Your next 10 appointments already scheduled     Sep 10, 2018  2:15 PM CDT   (Arrive by 2:00 PM)   SHORT with PRISCA Umanzor   [facility] ([facility] )    [address] [facility] [address]   [phone]              Who to contact     If you have questions or need follow up information about today's clinic visit or your schedule please contact [facility] directly at [phone].  Normal or non-critical lab and imaging results will be communicated to you by MyChart, letter or phone within 4 business days after the clinic has received the results. If you do not hear from us within 7 days, please contact the clinic through MyChart or phone. If you have a critical or abnormal lab result, we will notify you by phone as soon as possible.  Submit refill requests through [employer] or call your pharmacy and they will forward the refill request to us. Please allow 3 business days for your refill to be completed.          Additional Information About Your Visit        Care EveryWhere ID     This is your Care EveryWhere ID. This could be used by other organizations to access your [facility] medical records  [id number]        Your Vitals Were     Pulse Temperature Respirations Height Pulse Oximetry BMI (Body Mass Index)    102 100.9  F (38.3  C) (Tympanic) 26 5' 2\" (1.575 m) 89% 20.12 kg/m2       Blood Pressure from Last 3 Encounters:   09/05/18 160/70   09/27/17 180/72   06/21/17 148/78    Weight from Last 3 Encounters:   09/05/18 " 110 lb (49.9 kg)   09/27/17 120 lb (54.4 kg)   06/21/17 123 lb (55.8 kg)              We Performed the Following     CBC with platelets and differential          Today's Medication Changes          These changes are accurate as of 9/5/18  5:09 PM.  If you have any questions, ask your nurse or doctor.               Start taking these medicines.        Dose/Directions    albuterol (2.5 MG/3ML) 0.083% neb solution   Used for:  Panlobular emphysema (H)   Started by:  Joan Garnica PA        Dose:  2.5 mg   Take 1 vial (2.5 mg) by nebulization every 4 hours as needed for shortness of breath / dyspnea or wheezing   Quantity:  1 Box   Refills:  11       azithromycin 250 MG tablet   Commonly known as:  ZITHROMAX   Used for:  COPD exacerbation (H)   Started by:  Joan Garnica PA        Two tablets first day, then one tablet daily for four days.   Quantity:  6 tablet   Refills:  0       predniSONE 10 MG tablet   Commonly known as:  DELTASONE   Used for:  COPD exacerbation (H)   Started by:  Joan Garnica PA        40 mg daily for 3 days, then 30 mg daily for 3 days, then 20 mg daily for 3 days, then 10 mg daily for 3 days   Quantity:  30 tablet   Refills:  0         These medicines have changed or have updated prescriptions.        Dose/Directions    LOPRESSOR PO   This may have changed:  Another medication with the same name was removed. Continue taking this medication, and follow the directions you see here.   Changed by:  Joan Garnica PA        Dose:  25 mg   Take 25 mg by mouth every evening   Refills:  0            Where to get your medicines      These medications were sent to [id number] - [place], [place] - [address]   [id number] , [place] [address]     Phone:  [phone]     albuterol (2.5 MG/3ML) 0.083% neb solution    azithromycin 250 MG tablet    Ipratropium-Albuterol  MCG/ACT inhaler    predniSONE 10 MG tablet                Primary Care Provider Office Phone # Fax #     David Littlejohn [phone]       [address] [place] [address]        Equal Access to Services     DRAGAN TERRAZAS : Hadii aad ku hadidaliamel Phillips, mimida andreapascualha, lili kaquintin ball, nikos simental marilouchris beckham lajayeshjagdish toyin. [facility] [phone].    ATENCIÓN: Si habla español, tiene a villalobos disposición servicios gratuitos de asistencia lingüística. Llame al [phone].    We comply with applicable federal civil rights laws and [place] laws. We do not discriminate on the basis of race, color, national origin, age, disability, sex, sexual orientation, or gender identity.            Thank you!     Thank you for choosing [facility]  for your care. Our goal is always to provide you with excellent care. Hearing back from our patients is one way we can continue to improve our services. Please take a few minutes to complete the written survey that you may receive in the mail after your visit with us. Thank you!             Your Updated Medication List - Protect others around you: Learn how to safely use, store and throw away your medicines at www.disposemymeds.org.          This list is accurate as of 9/5/18  5:09 PM.  Always use your most recent med list.                   Brand Name Dispense Instructions for use Diagnosis    albuterol (2.5 MG/3ML) 0.083% neb solution     1 Box    Take 1 vial (2.5 mg) by nebulization every 4 hours as needed for shortness of breath / dyspnea or wheezing    Panlobular emphysema (H)       azithromycin 250 MG tablet    ZITHROMAX    6 tablet    Two tablets first day, then one tablet daily for four days.    COPD exacerbation (H)       clopidogrel 75 MG tablet    PLAVIX    90 tablet    TAKE 1 TABLET BY MOUTH ONCE DAILY    Coronary artery disease involving coronary bypass graft of native heart without angina pectoris       FLAXSEED      2,000 mg 2 times daily        Ipratropium-Albuterol  MCG/ACT inhaler    COMBIVENT RESPIMAT    3 Inhaler     Inhale 1 puff into the lungs 4 times daily Not to exceed 6 doses per day.    Panlobular emphysema (H)       LOPRESSOR PO      Take 25 mg by mouth every evening        lovastatin 20 MG tablet    MEVACOR    90 tablet    TAKE 1 TABLET BY MOUTH ONCE DAILY WITH  EVENING  MEAL    Mixed hyperlipidemia       order for DME     1 Device    Oxygen 2L via nasal cannula PRN dyspnea, hypoxia    Panlobular emphysema (H)       predniSONE 10 MG tablet    DELTASONE    30 tablet    40 mg daily for 3 days, then 30 mg daily for 3 days, then 20 mg daily for 3 days, then 10 mg daily for 3 days    COPD exacerbation (H)

## 2018-09-05 NOTE — LETTER
My COPD Action Plan     Name: Sheila Nuñez    YOB: 1945   Date: 9/5/2018    My doctor: PRISCA Morocho   My clinic: [address] [address]  [phone]  My Controller Medicine: { :072154}   Dose: ***     My Rescue Medicine: { :278102}   Dose: ***     My Flare Up Medicine: { :853571}   Dose: ***     My COPD Severity: { :995713}      Use of Oxygen: { :035141}     Make sure you've had your pneumonia   vaccines.          GREEN ZONE       Doing well today      Usual level of activity and exercise    Usual amount of cough and mucus    No shortness of breath    Usual level of health (thinking clearly, sleeping well, feel like eating) Actions:      Take daily medicines    Use oxygen as prescribed    Follow regular exercise and diet plan    Avoid cigarette smoke and other irritants that harm the lungs           YELLOW ZONE          Having a bad day or flare up      Short of breath more than usual    A lot more sputum (mucus) than usual    Sputum looks yellow, green, tan, brown or bloody    More coughing or wheezing    Fever or chills    Less energy; trouble completing activities    Trouble thinking or focusing    Using quick relief inhaler or nebulizer more often    Poor sleep; symptoms wake me up    Do not feel like eating Actions:      Get plenty of rest    Take daily medicines    Use quick relief inhaler every *** hours    If you use oxygen, call you doctor to see if you should adjust your oxygen    Do breathing exercises or other things to help you relax    Let a loved one, friend or neighbor know you are feeling worse    Call your care team if you have 2 or more symptoms.  Start taking steroids or antibiotics if directed by your care team           RED ZONE       Need medical care now      Severe shortness of breath (feel you can't breathe)    Fever, chills    Not enough breath to do any activity    Trouble coughing up mucus, walking or  talking    Blood in mucus    Frequent coughing   Rescue medicines are not working    Not able to sleep because of breathing    Feel confused or drowsy    Chest pain    Actions:      Call your health care team.  If you cannot reach your care team, call 911 or go to the emergency room.        Annual Reminders:  Meet with Care Team, Flu Shot every Fall  Pharmacy: [id number] - [place], [place] - [address] [address]

## 2018-09-05 NOTE — NURSING NOTE
"Chief Complaint   Patient presents with     COPD       Initial /70  Pulse 102  Temp 100.9  F (38.3  C) (Tympanic)  Resp 26  Ht 5' 2\" (1.575 m)  Wt 110 lb (49.9 kg)  SpO2 (!) 84%  BMI 20.12 kg/m2 Estimated body mass index is 20.12 kg/(m^2) as calculated from the following:    Height as of this encounter: 5' 2\" (1.575 m).    Weight as of this encounter: 110 lb (49.9 kg).  Medication Reconciliation: complete    Celia Montenegro LPN  "

## 2018-09-05 NOTE — PROGRESS NOTES
SUBJECTIVE:   Sheila Nuñez is a 73 year old female who presents to clinic today for the following health issues. 2 day history of increasing SOB, cough, chills, right chest pain. History of COPD     COPD Follow-Up    Symptoms are currently: much worse    Current fatigue or dyspnea with ambulation: worsened from baseline    Shortness of breath: much worse    Increased or change in Cough/Sputum: Yes-  Coughing up grey colored phelgm    Fever(s): Yes-  For 2 days    Baseline ambulation without stopping to rest:  1 block . Able to walk up 1 flights of stairs without stopping to rest.    Any ER/UC or hospital admissions since your last visit? No     History   Smoking Status     Current Every Day Smoker     Packs/day: 0.25     Years: 20.00     Types: Cigarettes     Last attempt to quit: 8/30/2015   Smokeless Tobacco     Never Used     Comment: does not want quit plan. working on quitting now.     No results found for: FEV1, IOP3PZI    Amount of exercise or physical activity: 6-7 days/week for an average of walks dog to the mail box and back    Problems taking medications regularly: No    Medication side effects: none    Diet: regular (no restrictions)              Problem list and histories reviewed & adjusted, as indicated.  Additional history: as documented    Patient Active Problem List   Diagnosis     COPD (chronic obstructive pulmonary disease) (H)     Dyslipidemia     CHD (coronary heart disease)     Tobacco abuse     Asthma, moderate persistent     ACP (advance care planning)     COPD exacerbation (H)     Past Surgical History:   Procedure Laterality Date     ANGIOGRAM       stents         Social History   Substance Use Topics     Smoking status: Current Every Day Smoker     Packs/day: 0.25     Years: 20.00     Types: Cigarettes     Last attempt to quit: 8/30/2015     Smokeless tobacco: Never Used      Comment: does not want quit plan. working on quitting now.     Alcohol use No     Family History   Problem  Relation Age of Onset     C.A.D. Father 63     cause of death     C.A.D. Mother 78     cause of death         Current Outpatient Prescriptions   Medication Sig Dispense Refill     albuterol (2.5 MG/3ML) 0.083% neb solution Take 1 vial (2.5 mg) by nebulization every 4 hours as needed for shortness of breath / dyspnea or wheezing 1 Box 11     clopidogrel (PLAVIX) 75 MG tablet TAKE 1 TABLET BY MOUTH ONCE DAILY 90 tablet 0     FLAXSEED 2,000 mg 2 times daily        Ipratropium-Albuterol (COMBIVENT RESPIMAT)  MCG/ACT inhaler Inhale 1 puff into the lungs 4 times daily Not to exceed 6 doses per day. 3 Inhaler 3     lovastatin (MEVACOR) 20 MG tablet TAKE 1 TABLET BY MOUTH ONCE DAILY WITH  EVENING  MEAL 90 tablet 0     Metoprolol Tartrate (LOPRESSOR PO) Take 25 mg by mouth every evening       order for DME Oxygen 2L via nasal cannula PRN dyspnea, hypoxia 1 Device 0     [DISCONTINUED] Ipratropium-Albuterol (COMBIVENT RESPIMAT)  MCG/ACT inhaler Inhale 1 puff into the lungs 4 times daily Not to exceed 6 doses per day. 3 Inhaler 3     [DISCONTINUED] Metoprolol Tartrate (LOPRESSOR PO) Take 50 mg by mouth every morning       [DISCONTINUED] metoprolol tartrate (LOPRESSOR) 50 MG tablet TAKE 1 TABLET BY MOUTH TWICE DAILY WITH MEALS 60 tablet 1     Allergies   Allergen Reactions     Ciprofloxacin Other (See Comments)     Cipro  intolerance     Influenza Vaccines      Influenza virus vacc. Specific       Morphine Other (See Comments)     Becomes very mean     Nitrofurantoin Other (See Comments)     Macrobid  intolerance     Nitrofurantoin Macrocrystal Other (See Comments)     Macrobid  Intolerance         Reviewed and updated as needed this visit by clinical staff  Tobacco  Allergies  Meds  Med Hx  Surg Hx  Fam Hx  Soc Hx      Reviewed and updated as needed this visit by Provider         ROS:  Constitutional, HEENT, cardiovascular, pulmonary, gi and gu systems are negative, except as otherwise noted.    OBJECTIVE:      "                                               /70  Pulse 102  Temp 100.9  F (38.3  C) (Tympanic)  Resp 26  Ht 5' 2\" (1.575 m)  Wt 110 lb (49.9 kg)  SpO2 (!) 85%  BMI 20.12 kg/m2  Body mass index is 20.12 kg/(m^2).  General: Alert. Oriented. In no acute distress  Skin: No suspicious rashes or lesions.  HEENT: EAC's and TM's intact. Posterior pharynx moist and pink without edema or exudate.erythema. Neck is supple. No anterior chain  lymphadenopathy palpable.   Resp: distant breath sounds with end expiratory crackles  Cardiac: RRR. Normal S1, S2. No murmur.  Psych: Mood euthymic with corresponding affect.             ASSESSMENT/PLAN:                                                    (J44.1) COPD exacerbation (H)  (primary encounter diagnosis)  Comment: CXR read as normal. ZPack and Prednisone taper. O2 on 24hr/day until improved. Prompt f/u at ER with any worsening of symptoms. Otherwise f/u here on Monday  Plan: azithromycin (ZITHROMAX) 250 MG tablet,         predniSONE (DELTASONE) 10 MG tablet            (J43.1) COPD (chronic obstructive pulmonary disease) (H)  Plan: albuterol (2.5 MG/3ML) 0.083% neb solution,         Ipratropium-Albuterol (COMBIVENT RESPIMAT)          MCG/ACT inhaler            (R05) Cough  Plan: CBC with platelets and differential, XR CHEST 2        VW (Clinic Performed)          Rest, increase fluids, Tylenol for fever or discomfort. Return to clinic if symptoms persist or worsen.        PRISCA Morocho  [facility]  "

## 2018-09-06 ASSESSMENT — ANXIETY QUESTIONNAIRES: GAD7 TOTAL SCORE: 0

## 2018-09-06 ASSESSMENT — PATIENT HEALTH QUESTIONNAIRE - PHQ9: SUM OF ALL RESPONSES TO PHQ QUESTIONS 1-9: 4

## 2018-09-07 NOTE — PROGRESS NOTES
SUBJECTIVE:                                                    Sheila Nuñez is a 73 year old female who presents to clinic today for the following health issues: f/u of bronchitis. Cough and some SOB continue but much improved. No fever      Cough follow up      Duration: 2 weeks    Description (location/character/radiation): Dry cough    Intensity:  moderate    Accompanying signs and symptoms: SOB    History (similar episodes/previous evaluation): None    Precipitating or alleviating factors: None    Therapies tried and outcome: Z pac and Prednisone...Improved           Problem list and histories reviewed & adjusted, as indicated.  Additional history: as documented    Patient Active Problem List   Diagnosis     COPD (chronic obstructive pulmonary disease) (H)     Dyslipidemia     CHD (coronary heart disease)     Tobacco abuse     Asthma, moderate persistent     ACP (advance care planning)     COPD exacerbation (H)     Past Surgical History:   Procedure Laterality Date     ANGIOGRAM       stents         Social History   Substance Use Topics     Smoking status: Current Every Day Smoker     Packs/day: 0.25     Years: 20.00     Types: Cigarettes     Last attempt to quit: 8/30/2015     Smokeless tobacco: Never Used      Comment: does not want quit plan. working on quitting now.     Alcohol use No     Family History   Problem Relation Age of Onset     C.A.D. Father 63     cause of death     C.A.D. Mother 78     cause of death         Current Outpatient Prescriptions   Medication Sig Dispense Refill     albuterol (2.5 MG/3ML) 0.083% neb solution Take 1 vial (2.5 mg) by nebulization every 4 hours as needed for shortness of breath / dyspnea or wheezing 1 Box 11     clopidogrel (PLAVIX) 75 MG tablet TAKE 1 TABLET BY MOUTH ONCE DAILY 90 tablet 0     FLAXSEED 2,000 mg 2 times daily        Ipratropium-Albuterol (COMBIVENT RESPIMAT)  MCG/ACT inhaler Inhale 1 puff into the lungs 4 times daily Not to exceed 6 doses  "per day. 3 Inhaler 3     lovastatin (MEVACOR) 20 MG tablet TAKE 1 TABLET BY MOUTH ONCE DAILY WITH  EVENING  MEAL 90 tablet 0     Metoprolol Tartrate (LOPRESSOR PO) Take 25 mg by mouth every evening       order for DME Oxygen 2L via nasal cannula PRN dyspnea, hypoxia 1 Device 0     predniSONE (DELTASONE) 10 MG tablet 40 mg daily for 3 days, then 30 mg daily for 3 days, then 20 mg daily for 3 days, then 10 mg daily for 3 days 30 tablet 0     Allergies   Allergen Reactions     Ciprofloxacin Other (See Comments)     Cipro  intolerance     Influenza Vaccines      Influenza virus vacc. Specific       Morphine Other (See Comments)     Becomes very mean     Nitrofurantoin Other (See Comments)     Macrobid  intolerance     Nitrofurantoin Macrocrystal Other (See Comments)     Macrobid  Intolerance         ROS:  Constitutional, HEENT, cardiovascular, pulmonary, gi and gu systems are negative, except as otherwise noted.    OBJECTIVE:                                                    /70  Pulse 97  Temp 98.5  F (36.9  C) (Tympanic)  Ht 5' 2\" (1.575 m)  Wt 114 lb (51.7 kg)  SpO2 93%  BMI 20.85 kg/m2  Body mass index is 20.85 kg/(m^2).  General: Alert. Oriented. In no acute distress  Skin: No suspicious rashes or lesions.  HEENT: EAC's and TM's intact. Posterior pharynx moist and pink without edema or exudate.erythema. Neck is supple. No anterior chain  lymphadenopathy palpable.   Resp: Scattered rhonchi. No wheeze, rales  Cardiac: RRR. Normal S1, S2. No murmur.  Psych: Mood euthymic with corresponding affect.             ASSESSMENT/PLAN:                                                    (J40) Bronchitis  (primary encounter diagnosis)  Comment: Much improved with O2 sat 93% RA    Rest, increase fluids, Tylenol for fever or discomfort. Return to clinic if symptoms persist or worsen.              PRISCA Morocho  [facility]        "

## 2018-09-10 ENCOUNTER — OFFICE VISIT (OUTPATIENT)
Dept: FAMILY MEDICINE | Facility: OTHER | Age: 73
End: 2018-09-10
Attending: PHYSICIAN ASSISTANT
Payer: COMMERCIAL

## 2018-09-10 VITALS
HEIGHT: 62 IN | TEMPERATURE: 98.5 F | OXYGEN SATURATION: 93 % | WEIGHT: 114 LBS | DIASTOLIC BLOOD PRESSURE: 70 MMHG | SYSTOLIC BLOOD PRESSURE: 138 MMHG | HEART RATE: 97 BPM | BODY MASS INDEX: 20.98 KG/M2

## 2018-09-10 DIAGNOSIS — J40 BRONCHITIS: Primary | ICD-10-CM

## 2018-09-10 PROCEDURE — 99213 OFFICE O/P EST LOW 20 MIN: CPT | Performed by: PHYSICIAN ASSISTANT

## 2018-09-10 ASSESSMENT — PAIN SCALES - GENERAL: PAINLEVEL: NO PAIN (0)

## 2018-09-10 NOTE — MR AVS SNAPSHOT
"              After Visit Summary   9/10/2018    Sheila Nuñez    MRN: [id number]           Patient Information     Date Of Birth          1945        Visit Information        Provider Department      9/10/2018 2:15 PM Joan Garnica PA [facility]        Today's Diagnoses     Bronchitis    -  1       Follow-ups after your visit        Who to contact     If you have questions or need follow up information about today's clinic visit or your schedule please contact [facility] directly at [phone].  Normal or non-critical lab and imaging results will be communicated to you by MyChart, letter or phone within 4 business days after the clinic has received the results. If you do not hear from us within 7 days, please contact the clinic through MyChart or phone. If you have a critical or abnormal lab result, we will notify you by phone as soon as possible.  Submit refill requests through [employer] or call your pharmacy and they will forward the refill request to us. Please allow 3 business days for your refill to be completed.          Additional Information About Your Visit        Care EveryWhere ID     This is your Care EveryWhere ID. This could be used by other organizations to access your [facility] medical records  [id number]        Your Vitals Were     Pulse Temperature Height Pulse Oximetry BMI (Body Mass Index)       97 98.5  F (36.9  C) (Tympanic) 5' 2\" (1.575 m) 93% 20.85 kg/m2        Blood Pressure from Last 3 Encounters:   09/10/18 138/70   09/05/18 160/70   09/27/17 180/72    Weight from Last 3 Encounters:   09/10/18 114 lb (51.7 kg)   09/05/18 110 lb (49.9 kg)   09/27/17 120 lb (54.4 kg)              Today, you had the following     No orders found for display         Today's Medication Changes          These changes are accurate as of 9/10/18  4:42 PM.  If you have any questions, ask your nurse or doctor.               Stop taking these medicines if you haven't " already. Please contact your care team if you have questions.     azithromycin 250 MG tablet   Commonly known as:  ZITHROMAX                    Primary Care Provider Office Phone # Fax #    David Littlejohn [phone]       [facility] [place] [address]        Equal Access to Services     DRAGAN TERRAZAS : Alyce tripathi hadidaliao Soomaali, waaxda luqadaha, qaybta kaalmada adeegyada, nikos flynnrajaniakin deal. So [facility] [phone].    ATENCIÓN: Si habla español, tiene a villalobos disposición servicios gratuitos de asistencia lingüística. Sukhjinder al [phone].    We comply with applicable federal civil rights laws and [place] laws. We do not discriminate on the basis of race, color, national origin, age, disability, sex, sexual orientation, or gender identity.            Thank you!     Thank you for choosing [facility]  for your care. Our goal is always to provide you with excellent care. Hearing back from our patients is one way we can continue to improve our services. Please take a few minutes to complete the written survey that you may receive in the mail after your visit with us. Thank you!             Your Updated Medication List - Protect others around you: Learn how to safely use, store and throw away your medicines at www.disposemymeds.org.          This list is accurate as of 9/10/18  4:42 PM.  Always use your most recent med list.                   Brand Name Dispense Instructions for use Diagnosis    albuterol (2.5 MG/3ML) 0.083% neb solution     1 Box    Take 1 vial (2.5 mg) by nebulization every 4 hours as needed for shortness of breath / dyspnea or wheezing    Panlobular emphysema (H)       clopidogrel 75 MG tablet    PLAVIX    90 tablet    TAKE 1 TABLET BY MOUTH ONCE DAILY    Coronary artery disease involving coronary bypass graft of native heart without angina pectoris       FLAXSEED      2,000 mg 2 times daily        Ipratropium-Albuterol  MCG/ACT  inhaler    COMBIVENT RESPIMAT    3 Inhaler    Inhale 1 puff into the lungs 4 times daily Not to exceed 6 doses per day.    Panlobular emphysema (H)       LOPRESSOR PO      Take 25 mg by mouth every evening        lovastatin 20 MG tablet    MEVACOR    90 tablet    TAKE 1 TABLET BY MOUTH ONCE DAILY WITH  EVENING  MEAL    Mixed hyperlipidemia       order for DME     1 Device    Oxygen 2L via nasal cannula PRN dyspnea, hypoxia    Panlobular emphysema (H)       predniSONE 10 MG tablet    DELTASONE    30 tablet    40 mg daily for 3 days, then 30 mg daily for 3 days, then 20 mg daily for 3 days, then 10 mg daily for 3 days    COPD exacerbation (H)

## 2018-09-10 NOTE — LETTER
My COPD Action Plan     Name: Sheila Nuñez    YOB: 1945   Date: 9/10/2018    My doctor: PRISCA Morocho   My clinic: [address] [place] [facility] [address]  [phone]  My Controller Medicine: Albuterol (Proair/Ventolin/Proventolin)   Dose: 2.5mg/3ml     My Rescue Medicine: Albuterol (Proair/Ventolin/Proventil) inhaler   Dose: 20mg     My Flare Up Medicine: Prednisone   Dose: 10 MG     My COPD Severity:      Use of Oxygen: Oxygen Not Prescribed      Make sure you've had your pneumonia   vaccines.          GREEN ZONE       Doing well today      Usual level of activity and exercise    Usual amount of cough and mucus    No shortness of breath    Usual level of health (thinking clearly, sleeping well, feel like eating) Actions:      Take daily medicines    Use oxygen as prescribed    Follow regular exercise and diet plan    Avoid cigarette smoke and other irritants that harm the lungs           YELLOW ZONE          Having a bad day or flare up      Short of breath more than usual    A lot more sputum (mucus) than usual    Sputum looks yellow, green, tan, brown or bloody    More coughing or wheezing    Fever or chills    Less energy; trouble completing activities    Trouble thinking or focusing    Using quick relief inhaler or nebulizer more often    Poor sleep; symptoms wake me up    Do not feel like eating Actions:      Get plenty of rest    Take daily medicines    Use quick relief inhaler every 4 hours    If you use oxygen, call you doctor to see if you should adjust your oxygen    Do breathing exercises or other things to help you relax    Let a loved one, friend or neighbor know you are feeling worse    Call your care team if you have 2 or more symptoms.  Start taking steroids or antibiotics if directed by your care team           RED ZONE       Need medical care now      Severe shortness of breath (feel you can't breathe)    Fever, chills    Not enough breath  to do any activity    Trouble coughing up mucus, walking or talking    Blood in mucus    Frequent coughing   Rescue medicines are not working    Not able to sleep because of breathing    Feel confused or drowsy    Chest pain    Actions:      Call your health care team.  If you cannot reach your care team, call 911 or go to the emergency room.        Annual Reminders:  Meet with Care Team, Flu Shot every Fall  Pharmacy: [facility] [address] [facility] [address] [address]

## 2018-11-15 DIAGNOSIS — E78.2 MIXED HYPERLIPIDEMIA: ICD-10-CM

## 2018-11-15 NOTE — LETTER
[facility] [place]  [address]  [place] [address]  [phone]        February 5, 2019    Sheila Nuñez  [address]   [facility] [address]              Dear Sheila Nuñez    This is to remind you that your fasting lab is due.    You may call our office at [phone] to schedule an appointment.    Please disregard this notice if you have already had your labs drawn or made an appointment.        Sincerely,        Deneen Chacko MD

## 2018-11-15 NOTE — TELEPHONE ENCOUNTER
lovastatin (MEVACOR) 20 MG tablet     Last Written Prescription Date:  08/24/2018  Last Fill Quantity: 90,   # refills: 0  Last Office Visit: 09/10/2018  Future Office visit:       Routing refill request to provider for review/approval because:

## 2018-11-16 RX ORDER — LOVASTATIN 20 MG
TABLET ORAL
Qty: 90 TABLET | Refills: 0 | Status: SHIPPED | OUTPATIENT
Start: 2018-11-16 | End: 2019-02-19

## 2018-11-16 NOTE — TELEPHONE ENCOUNTER
Please see note below for Lovastatin   Patient due for labs.  I have signed for labs.  Nurse to call?  Please advise.  Thank you.       LDL on file in past 12 months           Recent Labs   Lab Test  03/30/15   0857   LDL  116

## 2018-12-29 DIAGNOSIS — I10 BENIGN ESSENTIAL HYPERTENSION: ICD-10-CM

## 2018-12-31 RX ORDER — METOPROLOL TARTRATE 50 MG
TABLET ORAL
Qty: 60 TABLET | Refills: 0 | Status: SHIPPED | OUTPATIENT
Start: 2018-12-31 | End: 2019-02-01

## 2019-02-01 DIAGNOSIS — I10 BENIGN ESSENTIAL HYPERTENSION: ICD-10-CM

## 2019-02-01 RX ORDER — METOPROLOL TARTRATE 50 MG
TABLET ORAL
Qty: 60 TABLET | Refills: 0 | Status: SHIPPED | OUTPATIENT
Start: 2019-02-01 | End: 2019-03-20

## 2019-02-01 NOTE — LETTER
February 1, 2019      Sheila Nuñez  [address]   [facility] [address]        Dear Sheila,     APPOINTMENT REMINDER:   Our records indicates that it is time for you to be seen for office visit, medication review and lab to be determined.     Your current medication request for Lopressor will be approved for one refill but you will need to be seen before any additional refills can be approved.  Taking care of your health is important to us, and ongoing visits with your provider are vital to your care.    We look forward to seeing you in the near future.  You may call our office at [phone] to schedule a visit.     Please disregard this notice if you have already made an appointment.      Sincerely,  David Littlejohn MD

## 2019-02-19 DIAGNOSIS — E78.2 MIXED HYPERLIPIDEMIA: ICD-10-CM

## 2019-02-19 RX ORDER — LOVASTATIN 20 MG
TABLET ORAL
Qty: 90 TABLET | Refills: 0 | Status: SHIPPED | OUTPATIENT
Start: 2019-02-19 | End: 2019-05-22

## 2019-02-19 NOTE — TELEPHONE ENCOUNTER
Protocol failed due to    LDL on file in past 12 months    Recent (12 mo) or future (30 days) visit within the authorizing provider's specialty     Last office visit with PCP 9/27/17. Lab ordered, letters sent 2/1/19, 2/5/19, and today. Please advise. Thank you!

## 2019-02-19 NOTE — TELEPHONE ENCOUNTER
Lovastatin  Last Written Prescription Date: 11/16/18  Last Fill Quantity: 90 # of Refills: 0  Last Office Visit: 9/10/18

## 2019-02-19 NOTE — LETTER
February 19, 2019      Sheila Nuñez  [address]   [facility] [place] [place] [address]        Dear Sheila,     APPOINTMENT REMINDER:   Our records indicates that it is time for you to be seen for a yearly follow up appointment and labs.     Your current medication request for Lovastatin will be approved for one refill but you will need to be seen before any additional refills can be approved.  Taking care of your health is important to us, and ongoing visits with your provider are vital to your care.    We look forward to seeing you in the near future.  You may call our office at [phone] to schedule a visit.     Please disregard this notice if you have already made an appointment.        Sincerely,  David Littlejohn MD

## 2019-03-20 DIAGNOSIS — I10 BENIGN ESSENTIAL HYPERTENSION: ICD-10-CM

## 2019-03-21 RX ORDER — METOPROLOL TARTRATE 50 MG
TABLET ORAL
Qty: 60 TABLET | Refills: 2 | Status: SHIPPED | OUTPATIENT
Start: 2019-03-21 | End: 2019-07-16

## 2019-03-21 NOTE — TELEPHONE ENCOUNTER
metoprolol tartrate (LOPRESSOR) 50 MG tablet    Last Written Prescription Date:  2/1/19  Last Fill Quantity: 60,   # refills: 0  Last Office Visit: 9/10/18  Future Office visit:    Next 5 appointments (look out 90 days)    Mar 29, 2019 10:20 AM CDT  (Arrive by 10:05 AM)  SHORT with David Littlejohn MD  [facility] ([facility] ) [address]  [place] [address]  [phone]

## 2019-05-22 DIAGNOSIS — E78.2 MIXED HYPERLIPIDEMIA: ICD-10-CM

## 2019-05-22 NOTE — TELEPHONE ENCOUNTER
Mevacor       Last Written Prescription Date:  2/19/2019  Last Fill Quantity: 90,   # refills: 0  Last Office Visit: 3/29/2019  Future Office visit:       Lipid panel   /30/15  8:57 AM [id number]    Component Value Flag Ref Range Units Status Collected Lab   Cholesterol 219  High   <200 mg/dL Final 03/30/2015  8:57 AM 59   Comment:   LDL Cholesterol is the primary guide to therapy.    The NCEP recommends further evaluation of: patients with cholesterol greater    than 200 mg/dL if additional risk factors are present, cholesterol greater   than    240 mg/dL, triglycerides greater than 150 mg/dL, or HDL less than 40 mg/dL.    Triglycerides 240  High   0 - 150 mg/dL Final 03/30/2015  8:57 AM 59   Comment:   Fasting specimen   HDL Cholesterol 55   >50 mg/dL Final 03/30/2015  8:57 AM 59   LDL Cholesterol Calculated 116   0 - 129 mg/dL Final 03/30/2015  8:57 AM 59   Comment:   LDL Cholesterol is the primary guide to therapy: LDL-cholesterol goal in high    risk patients is <100 mg/dL and in very high risk patients is <70 mg/dL.    VLDL-Cholesterol 48  High   0 - 30 mg/dL Final 03/30/2015  8:57 AM 59   Cholesterol/HDL Ratio 4.0   0.0 - 5.0  Final 03/30/2015  8:57 AM 59   Lab and Collection     Lipid Profile (Order: [id number]) - 3/30/2015

## 2019-05-23 RX ORDER — LOVASTATIN 20 MG
TABLET ORAL
Qty: 90 TABLET | Refills: 0 | Status: SHIPPED | OUTPATIENT
Start: 2019-05-23 | End: 2019-08-13

## 2019-06-08 ENCOUNTER — MEDICAL CORRESPONDENCE (OUTPATIENT)
Dept: HEALTH INFORMATION MANAGEMENT | Facility: CLINIC | Age: 74
End: 2019-06-08

## 2019-07-16 DIAGNOSIS — I10 BENIGN ESSENTIAL HYPERTENSION: ICD-10-CM

## 2019-07-16 RX ORDER — METOPROLOL TARTRATE 50 MG
TABLET ORAL
Qty: 60 TABLET | Refills: 1 | Status: SHIPPED | OUTPATIENT
Start: 2019-07-16 | End: 2019-10-01

## 2019-08-13 DIAGNOSIS — E78.2 MIXED HYPERLIPIDEMIA: ICD-10-CM

## 2019-08-14 NOTE — TELEPHONE ENCOUNTER
Lovastatin       Last Written Prescription Date:  5/23/2019  Last Fill Quantity: 90,   # refills: 0  Last Office Visit: 9/05/2018  Future Office visit:     Last FLP 3/30/15  Please advise refill.   Ligia Ca RN-BSN

## 2019-08-15 RX ORDER — LOVASTATIN 20 MG
TABLET ORAL
Qty: 90 TABLET | Refills: 0 | Status: SHIPPED | OUTPATIENT
Start: 2019-08-15 | End: 2019-11-19

## 2019-10-01 DIAGNOSIS — I10 BENIGN ESSENTIAL HYPERTENSION: ICD-10-CM

## 2019-10-04 RX ORDER — METOPROLOL TARTRATE 50 MG
TABLET ORAL
Qty: 60 TABLET | Refills: 1 | Status: SHIPPED | OUTPATIENT
Start: 2019-10-04 | End: 2019-12-20

## 2019-10-04 NOTE — TELEPHONE ENCOUNTER
Metoprolol       Last Written Prescription Date:  7/16/2019  Last Fill Quantity: 60,   # refills: 1  Last Office Visit: 9/10/2018  Future Office visit:       Routing refill request to provider for review/approval because:  Failed protocol due to patient due for office visit in last 12 months

## 2019-10-08 DIAGNOSIS — I25.810 CORONARY ARTERY DISEASE INVOLVING CORONARY BYPASS GRAFT OF NATIVE HEART WITHOUT ANGINA PECTORIS: ICD-10-CM

## 2019-10-11 RX ORDER — CLOPIDOGREL BISULFATE 75 MG/1
TABLET ORAL
Qty: 90 TABLET | Refills: 3 | Status: SHIPPED | OUTPATIENT
Start: 2019-10-11 | End: 2020-09-28

## 2019-11-19 DIAGNOSIS — E78.2 MIXED HYPERLIPIDEMIA: ICD-10-CM

## 2019-11-20 NOTE — TELEPHONE ENCOUNTER
lovastatin (MEVACOR) 20 MG tablet  Last visit date with prescribing provider: 9-  Last refill date: 8-  Quantity: 90, Refills: 0    Sarah Harry LPN

## 2019-11-22 RX ORDER — LOVASTATIN 20 MG
TABLET ORAL
Qty: 90 TABLET | Refills: 0 | Status: SHIPPED | OUTPATIENT
Start: 2019-11-22 | End: 2020-01-31

## 2019-12-17 DIAGNOSIS — I10 BENIGN ESSENTIAL HYPERTENSION: ICD-10-CM

## 2019-12-17 NOTE — LETTER
December 20, 2019      Sheila Nuñez  [address]   [facility] [address]        Dear Sheila,     APPOINTMENT REMINDER:   Our records indicates that it is time for you to be seen for your annual exam and medication review.     Your current medication request for metoprolol tartrate will be approved for one refill but you will need to be seen before any additional refills can be approved.  Taking care of your health is important to us, and ongoing visits with your provider are vital to your care.    We look forward to seeing you in the near future.  You may call our office at [phone] to schedule a visit.     Please disregard this notice if you have already made an appointment.        Sincerely,        David Littlejohn MD

## 2019-12-19 NOTE — TELEPHONE ENCOUNTER
Metoprolol      Last Written Prescription Date:  10/04/2019  Last Fill Quantity: 60,   # refills: 1  Last Office Visit: 3/29/2019  Future Office visit:

## 2019-12-20 RX ORDER — METOPROLOL TARTRATE 50 MG
TABLET ORAL
Qty: 60 TABLET | Refills: 0 | Status: SHIPPED | OUTPATIENT
Start: 2019-12-20 | End: 2020-01-31

## 2020-01-21 NOTE — PROGRESS NOTES
Subjective     Sheila Nuñez is a 74 year old female who presents to clinic today for the following health issues:    HPI   Hyperlipidemia Follow-Up      Are you regularly taking any medication or supplement to lower your cholesterol?   Yes- lkovastatin    Are you having muscle aches or other side effects that you think could be caused by your cholesterol lowering medication?  No    Hypertension Follow-up      Do you check your blood pressure regularly outside of the clinic? Yes     Are you following a low salt diet? No    Are your blood pressures ever more than 140 on the top number (systolic) OR more   than 90 on the bottom number (diastolic), for example 140/90? No    COPD Follow-Up    Overall, how are your COPD symptoms since your last clinic visit?  No change    How much fatigue or shortness of breath do you have when you are walking?  More than usual    How much shortness of breath do you have when you are resting?  None    How often do you cough? All the time    Have you noticed any change in your sputum/phlegm?  No    Have you experienced a recent fever? No    Please describe how far you can walk without stopping to rest:  The length of 3-5 rooms    How many flights of stairs are you able to walk up without stopping?  2    Have you had any Emergency Room Visits, Urgent Care Visits, or Hospital Admissions because of your COPD since your last office visit?  No    History   Smoking Status     Current Every Day Smoker     Packs/day: 0.25     Years: 20.00     Types: Cigarettes     Last attempt to quit: 8/30/2015   Smokeless Tobacco     Never Used     Comment: does not want quit plan. working on quitting now.     No results found for: FEV1, AXL8PJY      How many servings of fruits and vegetables do you eat daily?  2-3    On average, how many sweetened beverages do you drink each day (Examples: soda, juice, sweet tea, etc.  Do NOT count diet or artificially sweetened beverages)?   0    How many days per week do  you exercise enough to make your heart beat faster? 3 or less    How many minutes a day do you exercise enough to make your heart beat faster? 9 or less    How many days per week do you miss taking your medication? 0    Left neck mass      Duration: months    Description (location/character/radiation): left neck    Intensity:  NA    Accompanying signs and symptoms: None    History (similar episodes/previous evaluation): None    Precipitating or alleviating factors: None    Therapies tried and outcome: None     Sheila has developed a progressively enlarging mass on the left parotid region.   Patient Active Problem List   Diagnosis     COPD (chronic obstructive pulmonary disease) (H)     Dyslipidemia     CHD (coronary heart disease)     Tobacco abuse     Asthma, moderate persistent     ACP (advance care planning)     COPD exacerbation (H)     Past Surgical History:   Procedure Laterality Date     ANGIOGRAM       stents         Social History     Tobacco Use     Smoking status: Current Every Day Smoker     Packs/day: 0.25     Years: 20.00     Pack years: 5.00     Types: Cigarettes     Last attempt to quit: 2015     Years since quittin.4     Smokeless tobacco: Never Used     Tobacco comment: does not want quit plan. working on quitting now.   Substance Use Topics     Alcohol use: No     Alcohol/week: 0.0 standard drinks     Family History   Problem Relation Age of Onset     C.A.D. Father 63        cause of death     C.A.D. Mother 78        cause of death         Current Outpatient Medications   Medication Sig Dispense Refill     albuterol (2.5 MG/3ML) 0.083% neb solution Take 1 vial (2.5 mg) by nebulization every 4 hours as needed for shortness of breath / dyspnea or wheezing 1 Box 11     clopidogrel (PLAVIX) 75 MG tablet TAKE 1 TABLET BY MOUTH ONCE DAILY 90 tablet 3     FLAXSEED 2,000 mg 2 times daily        Ipratropium-Albuterol (COMBIVENT RESPIMAT)  MCG/ACT inhaler Inhale 1 puff into the lungs 4 times  daily Not to exceed 6 doses per day. 3 Inhaler 3     amLODIPine (NORVASC) 5 MG tablet Take 1 tablet (5 mg) by mouth daily 60 tablet 1     lovastatin (MEVACOR) 20 MG tablet TAKE 1 TABLET BY MOUTH ONCE DAILY WITH EVENING MEAL 90 tablet 3     metoprolol tartrate (LOPRESSOR) 50 MG tablet Take 1 tablet (50 mg) by mouth 2 times daily 60 tablet 3     Allergies   Allergen Reactions     Ciprofloxacin Other (See Comments)     Cipro  intolerance     Influenza Vaccines      Influenza virus vacc. Specific       Morphine Other (See Comments)     Becomes very mean     Nitrofurantoin Other (See Comments)     Macrobid  intolerance     Nitrofurantoin Macrocrystal Other (See Comments)     Macrobid  Intolerance       Recent Labs   Lab Test 01/27/20  0852 06/07/17  1005  09/02/15  0925 03/30/15  0857 09/23/14  0940 02/26/14  1030   A1C  --   --   --   --   --   --  5.9   *  --   --   --  116 143*  --    HDL 57  --   --   --  55 57  --    TRIG 97  --   --   --  240* 173*  --    ALT 13  --   --  14 25 18  --    CR 0.59 0.71   < > 0.62 0.69 0.73  --    GFRESTIMATED >90 81   < > >90  Non  GFR Calc   84 79  --    GFRESTBLACK >90 >90  African American GFR Calc     < > >90   GFR Calc   >90   GFR Calc   >90   GFR Calc    --    POTASSIUM 3.4 3.5   < > 3.4 3.9 4.0  --    TSH 0.09*  --   --   --   --  0.29*  --     < > = values in this interval not displayed.      BP Readings from Last 3 Encounters:   01/24/20 (!) 200/82   09/10/18 138/70   09/05/18 160/70    Wt Readings from Last 3 Encounters:   01/24/20 45.4 kg (100 lb)   09/10/18 51.7 kg (114 lb)   09/05/18 49.9 kg (110 lb)                    Charlene has had a progressively enlarging left neck mass.  She is a long term smoker  Reviewed and updated as needed this visit by Provider         Review of Systems   ROS COMP: Constitutional, HEENT, cardiovascular, pulmonary, gi and gu systems are negative, except as otherwise noted.       Objective    BP (!) 200/82   Pulse 79   Temp 97  F (36.1  C) (Tympanic)   Resp 22   Wt 45.4 kg (100 lb)   SpO2 (!) 88%   BMI 18.29 kg/m    Body mass index is 18.29 kg/m .  Physical Exam  Vitals signs and nursing note reviewed.   Constitutional:       General: She is not in acute distress.     Appearance: She is well-developed.   HENT:      Head: Normocephalic and atraumatic.      Right Ear: External ear normal.      Left Ear: External ear normal.      Mouth/Throat:      Comments: There is a mass noted in the left parotid region.   Eyes:      Conjunctiva/sclera: Conjunctivae normal.      Pupils: Pupils are equal, round, and reactive to light.   Neck:      Musculoskeletal: Neck supple.   Pulmonary:      Effort: Pulmonary effort is normal.      Breath sounds: Normal breath sounds.   Lymphadenopathy:      Cervical: No cervical adenopathy.   Skin:     General: Skin is warm and dry.   Neurological:      Mental Status: She is alert and oriented to person, place, and time.          Diagnostic Test Results:  Labs reviewed in Epic        Assessment & Plan     1. Tobacco abuse  Encourage smoking cessation  - Tobacco Cessation - Order to Satisfy Health Maintenance    2. Tobacco abuse counseling  As above    3. Mixed hyperlipidemia  Fasting labs reviewed.  Goals discussed.  Discussed the importance of diet, exercise, and weight reduction.  Follow up 12 months.   - CBC with platelets differential; Future  - Comprehensive metabolic panel; Future  - Lipid Profile; Future    4. Essential hypertension  Goals reviewed.  Continue home BP monitoring and same medication regimen.  Follow up 6 months.   - TSH with free T4 reflex; Future  - UA with Microscopic reflex to Culture; Future    5. Mass of left side of neck  Etiology undetermined.  CT scheduled.  Follow up post testing.  - CT Soft Tissue Neck w Contrast; Future  - XR CHEST 2 VW (Clinic Performed); Future       See Patient Instructions    No follow-ups on file.    David  MD Annetta  [facility] [place]

## 2020-01-24 ENCOUNTER — ANCILLARY PROCEDURE (OUTPATIENT)
Dept: GENERAL RADIOLOGY | Facility: OTHER | Age: 75
End: 2020-01-24
Attending: FAMILY MEDICINE
Payer: COMMERCIAL

## 2020-01-24 ENCOUNTER — OFFICE VISIT (OUTPATIENT)
Dept: FAMILY MEDICINE | Facility: OTHER | Age: 75
End: 2020-01-24
Attending: FAMILY MEDICINE
Payer: COMMERCIAL

## 2020-01-24 VITALS
DIASTOLIC BLOOD PRESSURE: 82 MMHG | SYSTOLIC BLOOD PRESSURE: 200 MMHG | HEART RATE: 79 BPM | RESPIRATION RATE: 22 BRPM | OXYGEN SATURATION: 88 % | TEMPERATURE: 97 F | BODY MASS INDEX: 18.29 KG/M2 | WEIGHT: 100 LBS

## 2020-01-24 DIAGNOSIS — E78.2 MIXED HYPERLIPIDEMIA: Primary | ICD-10-CM

## 2020-01-24 DIAGNOSIS — Z71.6 TOBACCO ABUSE COUNSELING: ICD-10-CM

## 2020-01-24 DIAGNOSIS — Z72.0 TOBACCO ABUSE: ICD-10-CM

## 2020-01-24 DIAGNOSIS — I10 ESSENTIAL HYPERTENSION: ICD-10-CM

## 2020-01-24 DIAGNOSIS — R22.1 MASS OF LEFT SIDE OF NECK: ICD-10-CM

## 2020-01-24 PROCEDURE — 71046 X-RAY EXAM CHEST 2 VIEWS: CPT | Mod: TC

## 2020-01-24 PROCEDURE — 99214 OFFICE O/P EST MOD 30 MIN: CPT | Performed by: FAMILY MEDICINE

## 2020-01-24 ASSESSMENT — PAIN SCALES - GENERAL: PAINLEVEL: NO PAIN (0)

## 2020-01-24 NOTE — NURSING NOTE
"Chief Complaint   Patient presents with     COPD     Lipids     Hypertension       Initial BP (!) 200/82   Pulse 79   Temp 97  F (36.1  C) (Tympanic)   Resp 22   Wt 45.4 kg (100 lb)   SpO2 (!) 88%   BMI 18.29 kg/m   Estimated body mass index is 18.29 kg/m  as calculated from the following:    Height as of 9/10/18: 1.575 m (5' 2\").    Weight as of this encounter: 45.4 kg (100 lb).  Medication Reconciliation: complete  Celia Montenegro LPN  "

## 2020-01-24 NOTE — PATIENT INSTRUCTIONS

## 2020-01-27 ENCOUNTER — TELEPHONE (OUTPATIENT)
Dept: FAMILY MEDICINE | Facility: OTHER | Age: 75
End: 2020-01-27

## 2020-01-27 DIAGNOSIS — I10 ESSENTIAL HYPERTENSION: Primary | ICD-10-CM

## 2020-01-27 DIAGNOSIS — I10 ESSENTIAL HYPERTENSION: ICD-10-CM

## 2020-01-27 DIAGNOSIS — E78.2 MIXED HYPERLIPIDEMIA: ICD-10-CM

## 2020-01-27 LAB
ALBUMIN SERPL-MCNC: 3.6 G/DL (ref 3.4–5)
ALP SERPL-CCNC: 125 U/L (ref 40–150)
ALT SERPL W P-5'-P-CCNC: 13 U/L (ref 0–50)
ANION GAP SERPL CALCULATED.3IONS-SCNC: 6 MMOL/L (ref 3–14)
AST SERPL W P-5'-P-CCNC: 12 U/L (ref 0–45)
BASOPHILS # BLD AUTO: 0 10E9/L (ref 0–0.2)
BASOPHILS NFR BLD AUTO: 0.2 %
BILIRUB SERPL-MCNC: 0.7 MG/DL (ref 0.2–1.3)
BUN SERPL-MCNC: 16 MG/DL (ref 7–30)
CALCIUM SERPL-MCNC: 9.1 MG/DL (ref 8.5–10.1)
CHLORIDE SERPL-SCNC: 102 MMOL/L (ref 94–109)
CHOLEST SERPL-MCNC: 185 MG/DL
CO2 SERPL-SCNC: 30 MMOL/L (ref 20–32)
CREAT SERPL-MCNC: 0.59 MG/DL (ref 0.52–1.04)
DIFFERENTIAL METHOD BLD: ABNORMAL
EOSINOPHIL # BLD AUTO: 0.1 10E9/L (ref 0–0.7)
EOSINOPHIL NFR BLD AUTO: 0.6 %
ERYTHROCYTE [DISTWIDTH] IN BLOOD BY AUTOMATED COUNT: 13.6 % (ref 10–15)
GFR SERPL CREATININE-BSD FRML MDRD: >90 ML/MIN/{1.73_M2}
GLUCOSE SERPL-MCNC: 101 MG/DL (ref 70–99)
HCT VFR BLD AUTO: 44.4 % (ref 35–47)
HDLC SERPL-MCNC: 57 MG/DL
HGB BLD-MCNC: 15.1 G/DL (ref 11.7–15.7)
LDLC SERPL CALC-MCNC: 109 MG/DL
LYMPHOCYTES # BLD AUTO: 1.2 10E9/L (ref 0.8–5.3)
LYMPHOCYTES NFR BLD AUTO: 8.9 %
MCH RBC QN AUTO: 30.4 PG (ref 26.5–33)
MCHC RBC AUTO-ENTMCNC: 34 G/DL (ref 31.5–36.5)
MCV RBC AUTO: 90 FL (ref 78–100)
MONOCYTES # BLD AUTO: 1.1 10E9/L (ref 0–1.3)
MONOCYTES NFR BLD AUTO: 8.1 %
NEUTROPHILS # BLD AUTO: 10.9 10E9/L (ref 1.6–8.3)
NEUTROPHILS NFR BLD AUTO: 82.2 %
NONHDLC SERPL-MCNC: 128 MG/DL
PLATELET # BLD AUTO: 237 10E9/L (ref 150–450)
POTASSIUM SERPL-SCNC: 3.4 MMOL/L (ref 3.4–5.3)
PROT SERPL-MCNC: 7.5 G/DL (ref 6.8–8.8)
RBC # BLD AUTO: 4.96 10E12/L (ref 3.8–5.2)
SODIUM SERPL-SCNC: 138 MMOL/L (ref 133–144)
T4 FREE SERPL-MCNC: 1.35 NG/DL (ref 0.76–1.46)
TRIGL SERPL-MCNC: 97 MG/DL
TSH SERPL DL<=0.005 MIU/L-ACNC: 0.09 MU/L (ref 0.4–4)
WBC # BLD AUTO: 13.2 10E9/L (ref 4–11)

## 2020-01-27 PROCEDURE — 80061 LIPID PANEL: CPT | Performed by: FAMILY MEDICINE

## 2020-01-27 PROCEDURE — 80050 GENERAL HEALTH PANEL: CPT | Performed by: FAMILY MEDICINE

## 2020-01-27 PROCEDURE — 36415 COLL VENOUS BLD VENIPUNCTURE: CPT | Performed by: FAMILY MEDICINE

## 2020-01-27 PROCEDURE — 84439 ASSAY OF FREE THYROXINE: CPT | Performed by: FAMILY MEDICINE

## 2020-01-27 RX ORDER — AMLODIPINE BESYLATE 5 MG/1
5 TABLET ORAL DAILY
Qty: 60 TABLET | Refills: 1 | Status: SHIPPED | OUTPATIENT
Start: 2020-01-27 | End: 2020-06-26

## 2020-01-27 NOTE — TELEPHONE ENCOUNTER
Patient calls today stating she was supposed to report her blood pressure readings to you if they did not go down?  Please advise     What last few reading,    177/80    165/102    176/124    166/100    Has noticed that if she takes a half aspirin it will come down.  Please advise     On what you want to regarding hypertension and should she continue taking 1/2 and aspirin?  Please advise

## 2020-01-28 ENCOUNTER — TELEPHONE (OUTPATIENT)
Dept: FAMILY MEDICINE | Facility: OTHER | Age: 75
End: 2020-01-28

## 2020-01-28 DIAGNOSIS — I10 ESSENTIAL HYPERTENSION: Primary | ICD-10-CM

## 2020-01-29 ENCOUNTER — HOSPITAL ENCOUNTER (OUTPATIENT)
Dept: CT IMAGING | Facility: HOSPITAL | Age: 75
Discharge: HOME | End: 2020-01-29
Attending: FAMILY MEDICINE | Admitting: FAMILY MEDICINE
Payer: COMMERCIAL

## 2020-01-29 DIAGNOSIS — I10 ESSENTIAL HYPERTENSION: ICD-10-CM

## 2020-01-29 DIAGNOSIS — E78.2 MIXED HYPERLIPIDEMIA: ICD-10-CM

## 2020-01-29 DIAGNOSIS — I10 BENIGN ESSENTIAL HYPERTENSION: ICD-10-CM

## 2020-01-29 DIAGNOSIS — R22.1 MASS OF LEFT SIDE OF NECK: ICD-10-CM

## 2020-01-29 LAB — T3FREE SERPL-MCNC: 2.4 PG/ML (ref 2.3–4.2)

## 2020-01-29 PROCEDURE — 84481 FREE ASSAY (FT-3): CPT | Performed by: FAMILY MEDICINE

## 2020-01-29 PROCEDURE — 84482 T3 REVERSE: CPT | Performed by: FAMILY MEDICINE

## 2020-01-29 PROCEDURE — 70491 CT SOFT TISSUE NECK W/DYE: CPT | Mod: TC

## 2020-01-29 PROCEDURE — 25500064 ZZH RX 255 OP 636: Performed by: RADIOLOGY

## 2020-01-29 PROCEDURE — 36415 COLL VENOUS BLD VENIPUNCTURE: CPT | Performed by: FAMILY MEDICINE

## 2020-01-29 RX ORDER — IOPAMIDOL 612 MG/ML
100 INJECTION, SOLUTION INTRAVASCULAR ONCE
Status: COMPLETED | OUTPATIENT
Start: 2020-01-29 | End: 2020-01-29

## 2020-01-29 RX ADMIN — IOPAMIDOL 100 ML: 612 INJECTION, SOLUTION INTRAVENOUS at 08:40

## 2020-01-31 RX ORDER — METOPROLOL TARTRATE 50 MG
50 TABLET ORAL 2 TIMES DAILY
Qty: 60 TABLET | Refills: 3 | Status: SHIPPED | OUTPATIENT
Start: 2020-01-31 | End: 2020-07-15

## 2020-01-31 RX ORDER — LOVASTATIN 20 MG
TABLET ORAL
Qty: 90 TABLET | Refills: 3 | Status: SHIPPED | OUTPATIENT
Start: 2020-01-31 | End: 2021-01-26

## 2020-01-31 NOTE — TELEPHONE ENCOUNTER
Metoprolol      Last Written Prescription Date:  12/202019  Last Fill Quantity: 60,   # refills: 0  Last Office Visit: 1/24/2020  Future Office visit:         Lovastatin      Last Written Prescription Date:  11/22/2019  Last Fill Quantity: 90,   # refills: 0  Last Office Visit: 1/24/2020  Future Office visit:

## 2020-02-01 LAB — T3REVERSE SERPL-MCNC: 34.7 NG/DL (ref 9–27)

## 2020-02-03 ENCOUNTER — TELEPHONE (OUTPATIENT)
Dept: FAMILY MEDICINE | Facility: OTHER | Age: 75
End: 2020-02-03

## 2020-02-03 DIAGNOSIS — K11.8 MASS OF LEFT PAROTID GLAND: Primary | ICD-10-CM

## 2020-02-03 DIAGNOSIS — R22.1 MASS OF NECK: Primary | ICD-10-CM

## 2020-02-28 ENCOUNTER — OFFICE VISIT (OUTPATIENT)
Dept: FAMILY MEDICINE | Facility: OTHER | Age: 75
End: 2020-02-28
Attending: FAMILY MEDICINE
Payer: COMMERCIAL

## 2020-02-28 VITALS
BODY MASS INDEX: 16.61 KG/M2 | HEART RATE: 77 BPM | WEIGHT: 90.8 LBS | DIASTOLIC BLOOD PRESSURE: 82 MMHG | OXYGEN SATURATION: 90 % | TEMPERATURE: 97 F | RESPIRATION RATE: 24 BRPM | SYSTOLIC BLOOD PRESSURE: 198 MMHG

## 2020-02-28 DIAGNOSIS — K11.8 MASS OF LEFT PAROTID GLAND: Primary | ICD-10-CM

## 2020-02-28 DIAGNOSIS — E05.90 HYPERTHYROIDISM: ICD-10-CM

## 2020-02-28 DIAGNOSIS — I10 BENIGN ESSENTIAL HYPERTENSION: ICD-10-CM

## 2020-02-28 PROCEDURE — 99214 OFFICE O/P EST MOD 30 MIN: CPT | Performed by: FAMILY MEDICINE

## 2020-02-28 ASSESSMENT — PAIN SCALES - GENERAL: PAINLEVEL: NO PAIN (0)

## 2020-02-28 NOTE — NURSING NOTE
"Chief Complaint   Patient presents with     Thyroid Problem       Initial BP (!) 198/82   Pulse 77   Temp 97  F (36.1  C) (Tympanic)   Resp 24   Wt 41.2 kg (90 lb 12.8 oz)   SpO2 90%   BMI 16.61 kg/m   Estimated body mass index is 16.61 kg/m  as calculated from the following:    Height as of 9/10/18: 1.575 m (5' 2\").    Weight as of this encounter: 41.2 kg (90 lb 12.8 oz).  Medication Reconciliation: complete  Celia Montenegro LPN  "

## 2020-02-28 NOTE — PROGRESS NOTES
Subjective     Sheila Nuñez is a 74 year old female who presents to clinic today for the following health issues:    HPI   Hypertension      Do you check your blood pressure regularly outside of the clinic? Yes     Are you following a low salt diet? Yes    Are your blood pressures ever more than 140 on the top number (systolic) OR more   than 90 on the bottom number (diastolic), for example 140/90? Yes  DID NOT START TAKING AMALODAPINE     Hypothyroidism Follow-up      Since last visit, patient describes the following symptoms: Weight stable, no hair loss, no skin changes, no constipation, no loose stools  Labs were drawn and showed a suppressed TSH, normal free T4, and and elevated reverse T3.  She has had symptoms of hyperthyroidism.    Left neck mass       Duration: months    Description (location/character/radiation): left neck    Intensity:  NA    Accompanying signs and symptoms: None    History (similar episodes/previous evaluation): None    Precipitating or alleviating factors: None    Therapies tried and outcome: None      Sheila has developed a progressively enlarging mass on the left parotid region. She underwent CT scanning which showed a left parotid mass. She has not seen ENT      Patient Active Problem List   Diagnosis     COPD (chronic obstructive pulmonary disease) (H)     Dyslipidemia     CHD (coronary heart disease)     Tobacco abuse     Asthma, moderate persistent     ACP (advance care planning)     COPD exacerbation (H)     Past Surgical History:   Procedure Laterality Date     ANGIOGRAM       stents         Social History     Tobacco Use     Smoking status: Current Every Day Smoker     Packs/day: 0.25     Years: 20.00     Pack years: 5.00     Types: Cigarettes     Last attempt to quit: 2015     Years since quittin.5     Smokeless tobacco: Never Used     Tobacco comment: does not want quit plan. working on quitting now.   Substance Use Topics     Alcohol use: No     Alcohol/week:  0.0 standard drinks     Family History   Problem Relation Age of Onset     C.A.D. Father 63        cause of death     C.A.D. Mother 78        cause of death         Current Outpatient Medications   Medication Sig Dispense Refill     albuterol (2.5 MG/3ML) 0.083% neb solution Take 1 vial (2.5 mg) by nebulization every 4 hours as needed for shortness of breath / dyspnea or wheezing 1 Box 11     amLODIPine (NORVASC) 5 MG tablet Take 1 tablet (5 mg) by mouth daily 60 tablet 1     clopidogrel (PLAVIX) 75 MG tablet TAKE 1 TABLET BY MOUTH ONCE DAILY 90 tablet 3     FLAXSEED 2,000 mg 2 times daily        Ipratropium-Albuterol (COMBIVENT RESPIMAT)  MCG/ACT inhaler Inhale 1 puff into the lungs 4 times daily Not to exceed 6 doses per day. 3 Inhaler 3     lovastatin (MEVACOR) 20 MG tablet TAKE 1 TABLET BY MOUTH ONCE DAILY WITH EVENING MEAL 90 tablet 3     metoprolol tartrate (LOPRESSOR) 50 MG tablet Take 1 tablet (50 mg) by mouth 2 times daily 60 tablet 3     Allergies   Allergen Reactions     Ciprofloxacin Other (See Comments)     Cipro  intolerance     Influenza Vaccines      Influenza virus vacc. Specific       Morphine Other (See Comments)     Becomes very mean     Nitrofurantoin Other (See Comments)     Macrobid  intolerance     Nitrofurantoin Macrocrystal Other (See Comments)     Macrobid  Intolerance       Recent Labs   Lab Test 01/27/20  0852 06/07/17  1005  09/02/15  0925 03/30/15  0857 09/23/14  0940 02/26/14  1030   A1C  --   --   --   --   --   --  5.9   *  --   --   --  116 143*  --    HDL 57  --   --   --  55 57  --    TRIG 97  --   --   --  240* 173*  --    ALT 13  --   --  14 25 18  --    CR 0.59 0.71   < > 0.62 0.69 0.73  --    GFRESTIMATED >90 81   < > >90  Non  GFR Calc   84 79  --    GFRESTBLACK >90 >90  African American GFR Calc     < > >90   GFR Calc   >90   GFR Calc   >90   GFR Calc    --    POTASSIUM 3.4 3.5   < > 3.4 3.9 4.0   --    TSH 0.09*  --   --   --   --  0.29*  --     < > = values in this interval not displayed.      BP Readings from Last 3 Encounters:   02/28/20 (!) 198/82   01/24/20 (!) 200/82   09/10/18 138/70    Wt Readings from Last 3 Encounters:   02/28/20 41.2 kg (90 lb 12.8 oz)   01/24/20 45.4 kg (100 lb)   09/10/18 51.7 kg (114 lb)            Reviewed and updated as needed this visit by Provider         Review of Systems   ROS COMP: Constitutional, HEENT, cardiovascular, pulmonary, gi and gu systems are negative, except as otherwise noted.      Objective    BP (!) 198/82   Pulse 77   Temp 97  F (36.1  C) (Tympanic)   Resp 24   Wt 41.2 kg (90 lb 12.8 oz)   SpO2 90%   BMI 16.61 kg/m    Body mass index is 16.61 kg/m .  Physical Exam  Vitals signs and nursing note reviewed.   Constitutional:       General: She is not in acute distress.     Appearance: She is well-developed.   Neurological:      Mental Status: She is alert and oriented to person, place, and time.   Psychiatric:         Mood and Affect: Mood normal.         Behavior: Behavior normal.         Thought Content: Thought content normal.        Other exam not repeated    Diagnostic Test Results:  Labs reviewed in Epic        Assessment & Plan     1. Mass of left parotid gland  CT reviewed in detail.  Sheila states the mass comes and goes.  Discussed the importance of ENT consultation.    2. Benign essential hypertension  Reviewed persistently elevated pressures.  Encourage restarting amlodipine and follow home blood pressure monitoring.  Follow up one month.     3. Hyperthyroidism  Suspect T3 toxicosis.  Appointment with Endocrinology scheduled for evaluation and recommendations for further management.   - ENDOCRINOLOGY ADULT REFERRAL       See Patient Instructions    No follow-ups on file.    David Littlejohn MD  [facility] - [place]

## 2020-02-28 NOTE — PATIENT INSTRUCTIONS
Take amlodipine 5 mg once daily.  Appointment with ENT scheduled for evaluation and recommendations for further management.

## 2020-04-23 ENCOUNTER — TELEPHONE (OUTPATIENT)
Dept: OTOLARYNGOLOGY | Facility: OTHER | Age: 75
End: 2020-04-23

## 2020-04-23 NOTE — TELEPHONE ENCOUNTER
Pt called to make an appointment.  She was offered a telephone visit or video visit, but would like to be seen.  She states that her ear is so plugged.  She was referred by Dr. Littlejohn for mass of the left parotid gland.  Please advise.

## 2020-04-27 NOTE — PATIENT INSTRUCTIONS
Thank you for allowing Dr. Benton and our ENT team to participate in your care.  If your medications are too expensive, please give the nurse a call.  We can possibly change this medication.  If you have a scheduling or an appointment question please contact our Health Unit Coordinator at their direct line [phone].   ALL nursing questions or concerns can be directed to your ENT nurse at: [phone] - Ayesha    Use Ear Drops as prescribed     Complete Audiogram and Follow up with Dr. Benton next Thursday    We will notify you once the biopsy results become available    Follow up with [facility] regarding parotid surgery

## 2020-04-29 NOTE — PROGRESS NOTES
Otolaryngology Consultation    Patient: Sheila Nuñez  : 1945    Patient presents with:  Consult: Left Parotid Mass; Referred by Dr. Littlejohn      HPI:  Sheila Nuñez is a 74 year old female seen today for presents for evaluation of a left parotid mass.    She has noticed left preauricular mass for over 3 years.  However over the last month or so it seems to have become more firm.  She denies any fluctuation in size of the mass with oral intake or any generalized parotid swelling.    She denies facial weakness  She denies any associated discomfort or distasteful oral drainage.    She does have a history of tobacco abuse and quit several months ago.    Joanne has also had a 15 to 20 pound weight loss over the past year which she relates to hyperthyroidism she also has COPD and has had some problems with hypertension all of this is being managed with her primary Dr. Littlejohn who she saw on  his note was reviewed.    She is also noticed left ear plugging for about 1 month following an episode of sinusitis.  She denies any chronic otitis media or prior ear surgery.  She states her son did have some type of extensive ear surgery at the [place] it sounds like cholesteatoma with tympanomastoidectomy.  She denies any otorrhea or otalgia but just notes left hearing loss and difficulty with conversation.  No fluctuating hearing loss tinnitus or vertigo    Imaging  CAT scan soft tissue neck was reviewed gait dated  and shows a 1.6 cm superficial lobe left parotid mass no cervical lymphadenopathy no contralateral parotid mass.  The mass appears somewhat lobulated and is positioned in the tail the parotid.  Nasopharynx grossly clear.        Current Outpatient Rx   Medication Sig Dispense Refill     albuterol (2.5 MG/3ML) 0.083% neb solution Take 1 vial (2.5 mg) by nebulization every 4 hours as needed for shortness of breath / dyspnea or wheezing 1 Box 11     amLODIPine (NORVASC) 5 MG  "tablet Take 1 tablet (5 mg) by mouth daily 60 tablet 1     clopidogrel (PLAVIX) 75 MG tablet TAKE 1 TABLET BY MOUTH ONCE DAILY 90 tablet 3     FLAXSEED 2,000 mg 2 times daily        Ipratropium-Albuterol (COMBIVENT RESPIMAT)  MCG/ACT inhaler Inhale 1 puff into the lungs 4 times daily Not to exceed 6 doses per day. 3 Inhaler 3     lovastatin (MEVACOR) 20 MG tablet TAKE 1 TABLET BY MOUTH ONCE DAILY WITH EVENING MEAL 90 tablet 3     metoprolol tartrate (LOPRESSOR) 50 MG tablet Take 1 tablet (50 mg) by mouth 2 times daily 60 tablet 3       Allergies: Ciprofloxacin; Influenza vaccines; Morphine; Nitrofurantoin; and Nitrofurantoin macrocrystal     Past Medical History:   Diagnosis Date     Asthma 1/3/2012     CHD (coronary heart disease) 2011     Dyslipidemia 10/06/2011     Tobacco abuse 2011       Past Surgical History:   Procedure Laterality Date     ANGIOGRAM       stents         ENT family history reviewed    Social History     Tobacco Use     Smoking status: Current Every Day Smoker     Packs/day: 0.25     Years: 20.00     Pack years: 5.00     Types: Cigarettes     Last attempt to quit: 2015     Years since quittin.6     Smokeless tobacco: Never Used     Tobacco comment: does not want quit plan. working on quitting now.   Substance Use Topics     Alcohol use: No     Alcohol/week: 0.0 standard drinks     Drug use: No       Review of Systems  ROS: 10 point ROS neg other than the symptoms noted above in the HPI and shortness of breath when supine    Physical Exam  /78 (BP Location: Right arm, Patient Position: Chair, Cuff Size: Adult Regular)   Pulse 78   Temp 98.1  F (36.7  C) (Tympanic)   Ht 1.588 m (5' 2.5\")   Wt 43.1 kg (95 lb)   SpO2 91%   BMI 17.10 kg/m      General - The patient is well nourished and well developed, and appears thin.  Alert and oriented to person and place, answers questions and cooperates with examination appropriately.   Head and Face - Normocephalic and " atraumatic, with no gross asymmetry noted.  The facial nerve is intact, with strong symmetric movements. Grade 1/6 bilaterally  Voice and Breathing - The patient was breathing comfortably without the use of accessory muscles. There was no wheezing, stridor, or stertor.  The patients voice was clear and strong, and had appropriate pitch and quality.  No gabriella peripheral digital clubbing or cyanosis   Ears -examined under microscopy bilaterally  Cerumen removed bilaterally with a loop and alligator.  the external auditory canals are patent, the right tympanic membrane is intact without effusion, retraction or mass.  Bony landmarks are intact.  Left TM with serous effusion and retraction, no evidence of cholesteatoma.  Mild excoriation left EAC, ciprodex applied, cipro allergy was a cipro intolerance no hx anaphylaxis  Eyes - Extraocular movements intact, and the pupils were reactive to light.  Sclera were not icteric or injected, conjunctiva were pink and moist.  Mouth - Examination of the oral cavity showed pink, healthy oral mucosa. No lesions or ulcerations noted.  The tongue was mobile and midline, and edentulous with upper and lower dentures.    Throat - The walls of the oropharynx were smooth, pink, moist, symmetric, and had no lesions or ulcerations.  The tonsillar pillars and soft palate were symmetric.  The uvula was midline on elevation.    Neck - Left firm 1.5 cm preauricular parotid mass with extension vs separate infra and postauricular extension of the mass measuring 1.0 cm.  No right parotid mass  No palpable enlarged fixed cervical lymph nodes.  No neck cysts or unusual tenderness to palpation.   No palpable fixed thyroid nodules or concerning goiter.  The trachea is grossly midline.   Nose - External contour is symmetric, no gross deflection or scars.  Nasal mucosa is pink and moist with no abnormal mucus.  The septum and turbinates were evaluated: nonobstructive.  No polyps, masses, or purulence  noted on examination.    covid precautions taken throughout the exam    Nasopharyngoscopy  Due to concern for nasopharyngeal mass and unilateral effusion and a recently quit smoker I did proceed with flexible nasopharyngoscopy.  She is also had concerning weight loss.  After informed consent was obtained and the nose was anesthetized with pledgets soaked with neosynepherine/lidocaine, the scope was advanced into the nares.  There is no purulence and/ or excessive swelling.  Eustachian tubes are patent, no nasopharyngeal mass.  The scope was removed she tolerated this well    FNA  After consent was obtained and the area prepped with alcohol, a skin marker was used to demarcate the mass in the left parotid.  2% lidocaine with 1:100,000 of epinephrine was used to anesthetize the are area.  The skin was prepped in the normal fashion and a time out was taken.  A 25 and 21 guage needle was used to obtain representative cellular material which was collected and prepared on slides and sent to pathology.  Facial nerve function was symmetric grade 1 out of 6 bilaterally post procedure        Impression and Plan- Sheila Nuñez is a 74 year old female with:    ICD-10-CM    1. Mass of left parotid gland  K11.8 Fine needle aspiration     OTOLARYNGOLOGY REFERRAL   2. COME (chronic otitis media with effusion), left  H65.492 AUDIOLOGY ADULT REFERRAL   3. Acute contact otitis externa of left ear  H60.532 ciprofloxacin-dexamethasone (CIPRODEX) 0.3-0.1 % otic suspension   4. Tympanic membrane retraction, left  H73.892    5. Conductive hearing loss of left ear, unspecified hearing status on contralateral side  H90.12          Complete Audiogram and Follow up with Dr. Benton next Thursday  In the interim start Valsalva exercises these were taught to her.  If the effusion and the considerable retraction persists I would proceed with office myringotomy and tube insertion insertion this was discussed with her today    Ear anatomy  and ET anatomy discussed    We will notify you once the biopsy results become available    Follow up with [facility] regarding parotid surgery, Dr. Hinds  Parotidectomy and facial nerve anatomy briefly discussed    The possibility of inadequate cellular material or non-diagnostic results, and the possibility of repeat FNA with image guidance,  was discussed with the patient    Addendum  FNA benign     FNA-left parotid mass.:   Negative for malignancy   Specimen Adequacy: Satisfactory for evaluation.     Raine Benton D.O.  Otolaryngology/Head and Neck Surgery  Allergy

## 2020-04-30 ENCOUNTER — OFFICE VISIT (OUTPATIENT)
Dept: OTOLARYNGOLOGY | Facility: OTHER | Age: 75
End: 2020-04-30
Attending: OTOLARYNGOLOGY
Payer: COMMERCIAL

## 2020-04-30 ENCOUNTER — TELEPHONE (OUTPATIENT)
Dept: FAMILY MEDICINE | Facility: OTHER | Age: 75
End: 2020-04-30

## 2020-04-30 VITALS
TEMPERATURE: 98.1 F | SYSTOLIC BLOOD PRESSURE: 136 MMHG | HEIGHT: 63 IN | BODY MASS INDEX: 16.83 KG/M2 | DIASTOLIC BLOOD PRESSURE: 78 MMHG | OXYGEN SATURATION: 91 % | WEIGHT: 95 LBS | HEART RATE: 78 BPM

## 2020-04-30 DIAGNOSIS — H73.892 TYMPANIC MEMBRANE RETRACTION, LEFT: ICD-10-CM

## 2020-04-30 DIAGNOSIS — H60.532 ACUTE CONTACT OTITIS EXTERNA OF LEFT EAR: ICD-10-CM

## 2020-04-30 DIAGNOSIS — H65.492 COME (CHRONIC OTITIS MEDIA WITH EFFUSION), LEFT: ICD-10-CM

## 2020-04-30 DIAGNOSIS — K11.8 MASS OF LEFT PAROTID GLAND: Primary | ICD-10-CM

## 2020-04-30 DIAGNOSIS — H90.12 CONDUCTIVE HEARING LOSS OF LEFT EAR, UNSPECIFIED HEARING STATUS ON CONTRALATERAL SIDE: ICD-10-CM

## 2020-04-30 PROCEDURE — 92504 EAR MICROSCOPY EXAMINATION: CPT | Performed by: OTOLARYNGOLOGY

## 2020-04-30 PROCEDURE — 10021 FNA BX W/O IMG GDN 1ST LES: CPT | Performed by: OTOLARYNGOLOGY

## 2020-04-30 PROCEDURE — 99204 OFFICE O/P NEW MOD 45 MIN: CPT | Mod: 25 | Performed by: OTOLARYNGOLOGY

## 2020-04-30 PROCEDURE — 88173 CYTOPATH EVAL FNA REPORT: CPT | Mod: TC | Performed by: OTOLARYNGOLOGY

## 2020-04-30 PROCEDURE — 92511 NASOPHARYNGOSCOPY: CPT | Performed by: OTOLARYNGOLOGY

## 2020-04-30 RX ORDER — CIPROFLOXACIN AND DEXAMETHASONE 3; 1 MG/ML; MG/ML
4 SUSPENSION/ DROPS AURICULAR (OTIC) 2 TIMES DAILY
Qty: 7.5 ML | Refills: 0 | Status: SHIPPED | OUTPATIENT
Start: 2020-04-30 | End: 2020-05-07

## 2020-04-30 ASSESSMENT — PAIN SCALES - GENERAL: PAINLEVEL: NO PAIN (0)

## 2020-04-30 ASSESSMENT — MIFFLIN-ST. JEOR: SCORE: 892.11

## 2020-04-30 NOTE — TELEPHONE ENCOUNTER
[facility] pharmacist called looking for clarification on Ciprodex prescription. Pharmacist states a drug allergy warning alerts when he tries to fill prescription. Pharmacist states he would like approval to continue to fill medication for patient.    Override for allergy was completed by provider when medication was signed in Epic.    Pharmacy can be reached at the following number:    [phone]

## 2020-04-30 NOTE — NURSING NOTE
"Chief Complaint   Patient presents with     Consult     Left Parotid Mass; Referred by Dr. Littlejohn       Initial /78 (BP Location: Right arm, Patient Position: Chair, Cuff Size: Adult Regular)   Pulse 78   Temp 98.1  F (36.7  C) (Tympanic)   Ht 1.588 m (5' 2.5\")   Wt 43.1 kg (95 lb)   SpO2 91%   BMI 17.10 kg/m   Estimated body mass index is 17.1 kg/m  as calculated from the following:    Height as of this encounter: 1.588 m (5' 2.5\").    Weight as of this encounter: 43.1 kg (95 lb).  Medication Reconciliation: complete  Ayesha Maradiaga LPN    "

## 2020-04-30 NOTE — LETTER
2020         RE: Sheila Nuñez  [address]  [facility] [address]        Dear Colleague,    Thank you for referring your patient, Sheila Nuñez, to the [facility]. Please see a copy of my visit note below.    Otolaryngology Consultation    Patient: Sheila Nuñez  : 1945    Patient presents with:  Consult: Left Parotid Mass; Referred by Dr. Littlejohn      HPI:  Sheila Nuñez is a 74 year old female seen today for presents for evaluation of a left parotid mass.    She has noticed left preauricular mass for over 3 years.  However over the last month or so it seems to have become more firm.  She denies any fluctuation in size of the mass with oral intake or any generalized parotid swelling.    She denies facial weakness  She denies any associated discomfort or distasteful oral drainage.    She does have a history of tobacco abuse and quit several months ago.    Joanne has also had a 15 to 20 pound weight loss over the past year which she relates to hyperthyroidism she also has COPD and has had some problems with hypertension all of this is being managed with her primary Dr. Littlejohn who she saw on  his note was reviewed.    She is also noticed left ear plugging for about 1 month following an episode of sinusitis.  She denies any chronic otitis media or prior ear surgery.  She states her son did have some type of extensive ear surgery at the [facility] it sounds like cholesteatoma with tympanomastoidectomy.  She denies any otorrhea or otalgia but just notes left hearing loss and difficulty with conversation.  No fluctuating hearing loss tinnitus or vertigo    Imaging  CAT scan soft tissue neck was reviewed gait dated  and shows a 1.6 cm superficial lobe left parotid mass no cervical lymphadenopathy no contralateral parotid mass.  The mass appears somewhat lobulated and is positioned in the tail the parotid.  Nasopharynx grossly  "clear.        Current Outpatient Rx   Medication Sig Dispense Refill     albuterol (2.5 MG/3ML) 0.083% neb solution Take 1 vial (2.5 mg) by nebulization every 4 hours as needed for shortness of breath / dyspnea or wheezing 1 Box 11     amLODIPine (NORVASC) 5 MG tablet Take 1 tablet (5 mg) by mouth daily 60 tablet 1     clopidogrel (PLAVIX) 75 MG tablet TAKE 1 TABLET BY MOUTH ONCE DAILY 90 tablet 3     FLAXSEED 2,000 mg 2 times daily        Ipratropium-Albuterol (COMBIVENT RESPIMAT)  MCG/ACT inhaler Inhale 1 puff into the lungs 4 times daily Not to exceed 6 doses per day. 3 Inhaler 3     lovastatin (MEVACOR) 20 MG tablet TAKE 1 TABLET BY MOUTH ONCE DAILY WITH EVENING MEAL 90 tablet 3     metoprolol tartrate (LOPRESSOR) 50 MG tablet Take 1 tablet (50 mg) by mouth 2 times daily 60 tablet 3       Allergies: Ciprofloxacin; Influenza vaccines; Morphine; Nitrofurantoin; and Nitrofurantoin macrocrystal     Past Medical History:   Diagnosis Date     Asthma 1/3/2012     CHD (coronary heart disease) 2011     Dyslipidemia 10/06/2011     Tobacco abuse 2011       Past Surgical History:   Procedure Laterality Date     ANGIOGRAM       stents         ENT family history reviewed    Social History     Tobacco Use     Smoking status: Current Every Day Smoker     Packs/day: 0.25     Years: 20.00     Pack years: 5.00     Types: Cigarettes     Last attempt to quit: 2015     Years since quittin.6     Smokeless tobacco: Never Used     Tobacco comment: does not want quit plan. working on quitting now.   Substance Use Topics     Alcohol use: No     Alcohol/week: 0.0 standard drinks     Drug use: No       Review of Systems  ROS: 10 point ROS neg other than the symptoms noted above in the HPI and shortness of breath when supine    Physical Exam  /78 (BP Location: Right arm, Patient Position: Chair, Cuff Size: Adult Regular)   Pulse 78   Temp 98.1  F (36.7  C) (Tympanic)   Ht 1.588 m (5' 2.5\")   Wt 43.1 kg (95 " lb)   SpO2 91%   BMI 17.10 kg/m      General - The patient is well nourished and well developed, and appears thin.  Alert and oriented to person and place, answers questions and cooperates with examination appropriately.   Head and Face - Normocephalic and atraumatic, with no gross asymmetry noted.  The facial nerve is intact, with strong symmetric movements. Grade 1/6 bilaterally  Voice and Breathing - The patient was breathing comfortably without the use of accessory muscles. There was no wheezing, stridor, or stertor.  The patients voice was clear and strong, and had appropriate pitch and quality.  No gabriella peripheral digital clubbing or cyanosis   Ears -examined under microscopy bilaterally  Cerumen removed bilaterally with a loop and alligator.  the external auditory canals are patent, the right tympanic membrane is intact without effusion, retraction or mass.  Bony landmarks are intact.  Left TM with serous effusion and retraction, no evidence of cholesteatoma.  Mild excoriation left EAC, ciprodex applied, cipro allergy was a cipro intolerance no hx anaphylaxis  Eyes - Extraocular movements intact, and the pupils were reactive to light.  Sclera were not icteric or injected, conjunctiva were pink and moist.  Mouth - Examination of the oral cavity showed pink, healthy oral mucosa. No lesions or ulcerations noted.  The tongue was mobile and midline, and edentulous with upper and lower dentures.    Throat - The walls of the oropharynx were smooth, pink, moist, symmetric, and had no lesions or ulcerations.  The tonsillar pillars and soft palate were symmetric.  The uvula was midline on elevation.    Neck - Left firm 1.5 cm preauricular parotid mass with extension vs separate infra and postauricular extension of the mass measuring 1.0 cm.  No right parotid mass  No palpable enlarged fixed cervical lymph nodes.  No neck cysts or unusual tenderness to palpation.   No palpable fixed thyroid nodules or concerning  goiter.  The trachea is grossly midline.   Nose - External contour is symmetric, no gross deflection or scars.  Nasal mucosa is pink and moist with no abnormal mucus.  The septum and turbinates were evaluated: nonobstructive.  No polyps, masses, or purulence noted on examination.    covid precautions taken throughout the exam    Nasopharyngoscopy  Due to concern for nasopharyngeal mass and unilateral effusion and a recently quit smoker I did proceed with flexible nasopharyngoscopy.  She is also had concerning weight loss.  After informed consent was obtained and the nose was anesthetized with pledgets soaked with neosynepherine/lidocaine, the scope was advanced into the nares.  There is no purulence and/ or excessive swelling.  Eustachian tubes are patent, no nasopharyngeal mass.  The scope was removed she tolerated this well    FNA  After consent was obtained and the area prepped with alcohol, a skin marker was used to demarcate the mass in the left parotid.  2% lidocaine with 1:100,000 of epinephrine was used to anesthetize the are area.  The skin was prepped in the normal fashion and a time out was taken.  A 25 and 21 guage needle was used to obtain representative cellular material which was collected and prepared on slides and sent to pathology.  Facial nerve function was symmetric grade 1 out of 6 bilaterally post procedure        Impression and Plan- Sheila Nuñez is a 74 year old female with:    ICD-10-CM    1. Mass of left parotid gland  K11.8 Fine needle aspiration     OTOLARYNGOLOGY REFERRAL   2. COME (chronic otitis media with effusion), left  H65.492 AUDIOLOGY ADULT REFERRAL   3. Acute contact otitis externa of left ear  H60.532 ciprofloxacin-dexamethasone (CIPRODEX) 0.3-0.1 % otic suspension   4. Tympanic membrane retraction, left  H73.892    5. Conductive hearing loss of left ear, unspecified hearing status on contralateral side  H90.12          Complete Audiogram and Follow up with Dr. Benton  next Thursday  In the interim start Valsalva exercises these were taught to her.  If the effusion and the considerable retraction persists I would proceed with office myringotomy and tube insertion insertion this was discussed with her today    Ear anatomy and ET anatomy discussed    We will notify you once the biopsy results become available    Follow up with [facility] regarding parotid surgery, Dr. Hinds  Parotidectomy and facial nerve anatomy briefly discussed    The possibility of inadequate cellular material or non-diagnostic results, and the possibility of repeat FNA with image guidance,  was discussed with the patient    Raine Benton D.O.  Otolaryngology/Head and Neck Surgery  Allergy      Again, thank you for allowing me to participate in the care of your patient.        Sincerely,        Raine Benton MD

## 2020-04-30 NOTE — PATIENT INSTRUCTIONS
Thank you for allowing Dr. Benton and our ENT team to participate in your care.  If your medications are too expensive, please give the nurse a call.  We can possibly change this medication.  If you have a scheduling or an appointment question please contact our Health Unit Coordinator at their direct line [phone].   ALL nursing questions or concerns can be directed to your ENT nurse at: [phone] - Ayesha    Instructions for Myringotomy Tubes ( Ear Tubes)          Recovery - The placement of ear tubes is a brief operation, and therefore the recovery from the anesthetic is usually less than a day.  However, in young children the sleep patterns, feeding, and behavior can be altered for several days.  Try to return to the daily routine as soon as possible and this issue will resolve without problems.  There are no restrictions to diet or activity after ear tube placement.    Medications - Children and adults can return to all preoperative medications after this procedure, including blood thinners.  You were sent home with ear drops, please use them as directed to assist in the rapid healing of the ear drum around the tube.  Pain medication may have been sent home with you, but a vast majority of the time, over the counter Tylenol or ibuprofen (advil) I sufficient. Finish prescription ear drops (4 drops twice a day).     Complications - A low grade fever (up to 100 degrees ) is not unusual in the day after tubes are placed.  Treat this with cool wash cloths to the forehead and Tylenol.  If the fever is higher, or does not respond to medication, call the Doctor s office or call service after hours.  A small amount of bloody drainage can occur for a day or two after ear tubes, and is perfectly normal, continue the ear drops as directed and it will clear up.    Water Precautions - Recent clinical research has shown that absolute water precautions are not always necessary.  Ear plugs or water head bands are not  necessary unless the ear is actively draining, or if your child does not like the sensation of water in the ear.    Follow up - Approximately 1 month after the tubes are placed I like to examine the ears to make sure there are no signs of complications, which are extremely rare.  You should already have an appointment in 1 month with ENT NP and audiology.  If not, call our office at [phone].  In some unusual cases the ears  reject  the tubes.  Depending on the situation, a hearing test may or may not be performed at that time.  Afterwards, follow up is done every 6 months, but of course earlier if there are any issues or problems.    Advantages of Tubes - After ear tube placement, there are certain benefits from having a direct communication of the middle ear space with the ear canal.  In the event of drainage from the ears with ear tubes in place ( which is common with colds and flus ) use the ear drops you were discharged home with using the same dosage and instructions.  This will clear up the ears without the need for oral antibiotics a majority of the time.  Another advantage is that with tubes in place, the ears automatically adjust to changes in atmospheric pressure ( such as in airplanes or elevation ).  In other words, if the tubes are open the ears will not hurt or pop!      Follow up with surgeon for parotidectomy in [place] or the [facility].

## 2020-05-01 LAB — COPATH REPORT: NORMAL

## 2020-05-06 NOTE — PROGRESS NOTES
"Otolaryngology Progress Note          Sheila Nuñez is a 74 year old female  Presents for follow-up    She was initially seen on 4/30/2020Presents for follow-up of left otitis media with effusion and left retraction nasopharyngoscopy was negative for mass eustachian tubes are patent she also has a left parotid mass fine-needle aspiration was benign she has been referred to Dr. Hinds at [facility] to consider parotidectomy.  I started her on Ciprodex drops as she did have some contact otitis externa of the left ear as well    She continues to smoke      Audiogram today's date reveals a moderate to severe mixed hearing loss on the left primarily a conductive loss with a flat B tympanogram on the left type a tympanogram right with mild sloping to moderate primarily sensorineural hearing loss SRT 35 dB in the right 50 dB in the left discrimination excellent bilaterally    Physical Exam  /72   Pulse 78   Temp 97.4  F (36.3  C) (Tympanic)   Ht 1.588 m (5' 2.5\")   Wt 43.1 kg (95 lb)   SpO2 90%   BMI 17.10 kg/m    General - The patient is well nourished and well developed, and appears to have good nutritional status.  Alert and oriented to person and place, interactive.  Ears- left ear examined under microscopy   External auditory canal is patent  Left TM with anterior and attic retraction with serous effusion    LEFT Myringotomy with Tube Placement    Procedure - I discussed the risks and complications of  Left tympanostomy tube insertion  Including topical anesthesia, bleeding, infection, change in hearing or hearing loss, tympanic membrane perforation, need for additional surgery, chronic ear drainage, tube occlusion or need for tube reinsertion, cholesteatoma.   All questions were answered and the patient/and or guardian wishes are to proceed with surgical intervention.   After discussion of the risks and benefits of myringotomy, informed consent was signed and placed in the chart.    I " proceeded to position the patient in a supine position in the examination chair.  Using the binocular surgical microscope, I then proceeded to clean the  LEFT canal of cerumen and squamous debris.  I was able to see the tympanic membrane.  Using a small cotton tipped applicator, I applied a tiny coating of phenol onto the tympanic membrane.  After visualizing a good gris, I then proceeded to use a myringotomy knife to make a radially oriented incision in the tympanic membrane.  Serous effusion noted and removed with a #5 and #3 suction.  Next, I proceeded to place a 1.27 duravent mm tube through the incision.  After confirming good positioning and a clearly visible open tube, otic drops were applied and a cotton ball was inserted into the canal.        Impression/Plan  Sheila Nuñez is a 74 year old female    ICD-10-CM    1. S/P myringotomy with insertion of tube  Z96.22 AUDIOLOGY ADULT REFERRAL     ciprofloxacin-dexamethasone (CIPRODEX) 0.3-0.1 % otic suspension   2. Mixed conductive and sensorineural hearing loss of left ear with restricted hearing of right ear  H90.A32    3. Tobacco abuse counseling  Z71.6    4. Parotid mass  K11.8          Complete ciprodex, intolerance no hx anaphylaxis    RTC with audio and tube check with Jacquelyn in 1 month  Notes improved hearing post procedure  Audio reviewed with Sheila Escobar appt at [facility] for parotid mass    FNAB reviewed parotid, benign    Tobacco cessation was strongly encouraged.  The associated risk of head and neck cancer was discussed.  Every year of cessation offers health benefits. This was discussed with the patient today and they voiced understanding.  Quit tools and a nicotine patch were offered.      Raine Benton D.O.  Otolaryngology/Head and Neck Surgery  Allergy

## 2020-05-07 ENCOUNTER — OFFICE VISIT (OUTPATIENT)
Dept: OTOLARYNGOLOGY | Facility: OTHER | Age: 75
End: 2020-05-07
Attending: OTOLARYNGOLOGY
Payer: COMMERCIAL

## 2020-05-07 ENCOUNTER — OFFICE VISIT (OUTPATIENT)
Dept: AUDIOLOGY | Facility: OTHER | Age: 75
End: 2020-05-07
Attending: AUDIOLOGIST
Payer: COMMERCIAL

## 2020-05-07 VITALS
WEIGHT: 95 LBS | TEMPERATURE: 97.4 F | OXYGEN SATURATION: 90 % | SYSTOLIC BLOOD PRESSURE: 130 MMHG | HEART RATE: 78 BPM | DIASTOLIC BLOOD PRESSURE: 72 MMHG | HEIGHT: 63 IN | BODY MASS INDEX: 16.83 KG/M2

## 2020-05-07 DIAGNOSIS — K11.8 PAROTID MASS: ICD-10-CM

## 2020-05-07 DIAGNOSIS — Z71.6 TOBACCO ABUSE COUNSELING: ICD-10-CM

## 2020-05-07 DIAGNOSIS — H90.A32 MIXED CONDUCTIVE AND SENSORINEURAL HEARING LOSS OF LEFT EAR WITH RESTRICTED HEARING OF RIGHT EAR: Primary | ICD-10-CM

## 2020-05-07 DIAGNOSIS — H69.92 DYSFUNCTION OF LEFT EUSTACHIAN TUBE: ICD-10-CM

## 2020-05-07 DIAGNOSIS — H65.492 COME (CHRONIC OTITIS MEDIA WITH EFFUSION), LEFT: ICD-10-CM

## 2020-05-07 DIAGNOSIS — H90.A32 MIXED CONDUCTIVE AND SENSORINEURAL HEARING LOSS OF LEFT EAR WITH RESTRICTED HEARING OF RIGHT EAR: ICD-10-CM

## 2020-05-07 DIAGNOSIS — Z96.22 S/P MYRINGOTOMY WITH INSERTION OF TUBE: Primary | ICD-10-CM

## 2020-05-07 PROCEDURE — 69433 CREATE EARDRUM OPENING: CPT | Performed by: OTOLARYNGOLOGY

## 2020-05-07 PROCEDURE — 92557 COMPREHENSIVE HEARING TEST: CPT | Performed by: AUDIOLOGIST

## 2020-05-07 PROCEDURE — 92567 TYMPANOMETRY: CPT | Performed by: AUDIOLOGIST

## 2020-05-07 RX ORDER — CIPROFLOXACIN AND DEXAMETHASONE 3; 1 MG/ML; MG/ML
4 SUSPENSION/ DROPS AURICULAR (OTIC) 2 TIMES DAILY
Qty: 7.5 ML | Refills: 1 | Status: SHIPPED | OUTPATIENT
Start: 2020-05-07 | End: 2020-05-14

## 2020-05-07 ASSESSMENT — MIFFLIN-ST. JEOR: SCORE: 892.11

## 2020-05-07 ASSESSMENT — PAIN SCALES - GENERAL: PAINLEVEL: NO PAIN (0)

## 2020-05-07 NOTE — LETTER
"    5/7/2020         RE: Sheila Nuñez  [address]  [facility] [address]        Dear Colleague,    Thank you for referring your patient, Sheila Nuñez, to the [facility]. Please see a copy of my visit note below.    Otolaryngology Progress Note          Sheila Nuñez is a 74 year old female  Presents for follow-up    She was initially seen on 4/30/2020Presents for follow-up of left otitis media with effusion and left retraction nasopharyngoscopy was negative for mass eustachian tubes are patent she also has a left parotid mass fine-needle aspiration was benign she has been referred to Dr. Hinsd at [facility] to consider parotidectomy.  I started her on Ciprodex drops as she did have some contact otitis externa of the left ear as well    She continues to smoke      Audiogram today's date reveals a moderate to severe mixed hearing loss on the left primarily a conductive loss with a flat B tympanogram on the left type a tympanogram right with mild sloping to moderate primarily sensorineural hearing loss SRT 35 dB in the right 50 dB in the left discrimination excellent bilaterally    Physical Exam  /72   Pulse 78   Temp 97.4  F (36.3  C) (Tympanic)   Ht 1.588 m (5' 2.5\")   Wt 43.1 kg (95 lb)   SpO2 90%   BMI 17.10 kg/m    General - The patient is well nourished and well developed, and appears to have good nutritional status.  Alert and oriented to person and place, interactive.  Ears- left ear examined under microscopy   External auditory canal is patent  Left TM with anterior and attic retraction with serous effusion    LEFT Myringotomy with Tube Placement    Procedure - I discussed the risks and complications of  Left tympanostomy tube insertion  Including topical anesthesia, bleeding, infection, change in hearing or hearing loss, tympanic membrane perforation, need for additional surgery, chronic ear drainage, tube occlusion or need for tube " reinsertion, cholesteatoma.   All questions were answered and the patient/and or guardian wishes are to proceed with surgical intervention.   After discussion of the risks and benefits of myringotomy, informed consent was signed and placed in the chart.    I proceeded to position the patient in a supine position in the examination chair.  Using the binocular surgical microscope, I then proceeded to clean the  LEFT canal of cerumen and squamous debris.  I was able to see the tympanic membrane.  Using a small cotton tipped applicator, I applied a tiny coating of phenol onto the tympanic membrane.  After visualizing a good gris, I then proceeded to use a myringotomy knife to make a radially oriented incision in the tympanic membrane.  Serous effusion noted and removed with a #5 and #3 suction.  Next, I proceeded to place a 1.27 duravent mm tube through the incision.  After confirming good positioning and a clearly visible open tube, otic drops were applied and a cotton ball was inserted into the canal.        Impression/Plan  Sheila Nuñez is a 74 year old female    ICD-10-CM    1. S/P myringotomy with insertion of tube  Z96.22 AUDIOLOGY ADULT REFERRAL     ciprofloxacin-dexamethasone (CIPRODEX) 0.3-0.1 % otic suspension   2. Mixed conductive and sensorineural hearing loss of left ear with restricted hearing of right ear  H90.A32    3. Tobacco abuse counseling  Z71.6    4. Parotid mass  K11.8          Complete ciprodex, intolerance no hx anaphylaxis    RTC with audio and tube check with Jacquelyn in 1 month  Notes improved hearing post procedure  Audio reviewed with Sheila Escobar appt at [facility] for parotid mass    FNAB reviewed parotid, benign    Tobacco cessation was strongly encouraged.  The associated risk of head and neck cancer was discussed.  Every year of cessation offers health benefits. This was discussed with the patient today and they voiced understanding.  Quit tools and a nicotine patch were offered.       Raine Benton D.O.  Otolaryngology/Head and Neck Surgery  Allergy      Again, thank you for allowing me to participate in the care of your patient.        Sincerely,        Raine Benton MD

## 2020-05-07 NOTE — PROGRESS NOTES
Audiology Evaluation Completed. Please refer SCANNED AUDIOGRAM and/or TYMPANOGRAM for BACKGROUND, RESULTS, RECOMMENDATIONS.      Martha GARCIA, [facility]  Audiologist #[id number]

## 2020-05-07 NOTE — NURSING NOTE
"Chief Complaint   Patient presents with     RECHECK     Follow Up Left Parotid Mass, Left Chronic Otitis Media with Effusion, Tympanic Membrane Retraction       Initial /72   Pulse 78   Temp 97.4  F (36.3  C) (Tympanic)   Ht 1.588 m (5' 2.5\")   Wt 43.1 kg (95 lb)   SpO2 90%   BMI 17.10 kg/m   Estimated body mass index is 17.1 kg/m  as calculated from the following:    Height as of this encounter: 1.588 m (5' 2.5\").    Weight as of this encounter: 43.1 kg (95 lb).  Medication Reconciliation: complete  Barbara Cristobal LPN  "

## 2020-05-13 ENCOUNTER — TELEPHONE (OUTPATIENT)
Dept: FAMILY MEDICINE | Facility: OTHER | Age: 75
End: 2020-05-13

## 2020-05-13 DIAGNOSIS — R60.0 LOCALIZED EDEMA: Primary | ICD-10-CM

## 2020-05-13 NOTE — TELEPHONE ENCOUNTER
Pt called stating the amlodipine is causing increased fluid in her feet. Is wondering if this is an okay side effect to have. Or should be switched medications    Please advise   Hazel Braga LPN

## 2020-05-14 NOTE — TELEPHONE ENCOUNTER
Pt called she stated she needs lasix and she can not wait until her next visit.    Please advise.    Hazel Braga LPN

## 2020-05-18 RX ORDER — FUROSEMIDE 20 MG
20 TABLET ORAL DAILY
Qty: 30 TABLET | Refills: 0 | Status: SHIPPED | OUTPATIENT
Start: 2020-05-18 | End: 2022-02-07

## 2020-06-26 DIAGNOSIS — I10 ESSENTIAL HYPERTENSION: ICD-10-CM

## 2020-06-26 NOTE — TELEPHONE ENCOUNTER
Pt inquiring if she should continue to take amlodipine. Pt at this time has no concerns at this time. Denies any swelling in feet. Pt reports she would like to continue med if Dr. Littlejohn agrees.     Amlodipine       Last Written Prescription Date:  1/27/20  Last Fill Quantity: 60,   # refills: 1  Last Office Visit: 2/28/20  Future Office visit:       Routing refill request to provider for review/approval because:  Patient requesting doctor to advise. Protocol passed

## 2020-07-01 RX ORDER — AMLODIPINE BESYLATE 5 MG/1
5 TABLET ORAL DAILY
Qty: 60 TABLET | Refills: 3 | Status: SHIPPED | OUTPATIENT
Start: 2020-07-01 | End: 2020-11-23

## 2020-07-14 DIAGNOSIS — I10 BENIGN ESSENTIAL HYPERTENSION: ICD-10-CM

## 2020-07-15 RX ORDER — METOPROLOL TARTRATE 50 MG
TABLET ORAL
Qty: 60 TABLET | Refills: 3 | Status: SHIPPED | OUTPATIENT
Start: 2020-07-15 | End: 2020-12-28

## 2020-09-26 DIAGNOSIS — I25.810 CORONARY ARTERY DISEASE INVOLVING CORONARY BYPASS GRAFT OF NATIVE HEART WITHOUT ANGINA PECTORIS: ICD-10-CM

## 2020-09-28 RX ORDER — CLOPIDOGREL BISULFATE 75 MG/1
TABLET ORAL
Qty: 90 TABLET | Refills: 0 | Status: SHIPPED | OUTPATIENT
Start: 2020-09-28 | End: 2020-12-28

## 2020-09-28 NOTE — TELEPHONE ENCOUNTER
plavix 75 MG      Last Written Prescription Date:  10-11-19  Last Fill Quantity: 90,   # refills: 3  Last Office Visit: 2-  Future Office visit:

## 2020-11-05 ENCOUNTER — TRANSFERRED RECORDS (OUTPATIENT)
Dept: HEALTH INFORMATION MANAGEMENT | Facility: HOSPITAL | Age: 75
End: 2020-11-05

## 2020-11-12 ENCOUNTER — HOSPITAL ENCOUNTER (OUTPATIENT)
Facility: HOSPITAL | Age: 75
End: 2020-11-12
Attending: OPHTHALMOLOGY | Admitting: OPHTHALMOLOGY
Payer: COMMERCIAL

## 2020-11-19 NOTE — PROGRESS NOTES
[facility] [place]  [address]  [place] [address]  Phone: [phone]  Primary Provider: Samson Wick  Pre-op Performing Provider: SAMSON WICK    PREOPERATIVE EVALUATION:  Today's date: 11/23/2020    Sheila Nuñez is a 75 year old female who presents for a preoperative evaluation.    Surgical Information:  Surgery/Procedure: cataract extraction with interocular lense L Eye Surgery Location:[facility]   Surgeon: Dr Yarbrough  Surgery Date: 12/3   Time of Surgery: tbd   Where patient plans to recover: at home  Fax number for surgical facility:     Type of Anesthesia Anticipated: to be determined    Subjective     HPI related to upcoming procedure: Sheila has a history of left eye cataract and has had progressive vision loss.  She was seen by Opthalmology where it was felt the patient would benefit from surgical management.  I was asked to see her for preoperative medical clearance.        Preop Questions 11/23/2020   1. Have you ever had a heart attack or stroke? No   2. Have you ever had surgery on your heart or blood vessels, such as a stent placement, a coronary artery bypass, or surgery on an artery in your head, neck, heart, or legs? YES - remote   3. Do you have chest pain with activity? No   4. Do you have a history of  heart failure? No   5. Do you currently have a cold, bronchitis or symptoms of other infection? No   6. Do you have a cough, shortness of breath, or wheezing? YES - chronic   7. Do you or anyone in your family have previous history of blood clots? No   8. Do you or does anyone in your family have a serious bleeding problem such as prolonged bleeding following surgeries or cuts? No   9. Have you ever had problems with anemia or been told to take iron pills? No   10. Have you had any abnormal blood loss such as black, tarry or bloody stools, or abnormal vaginal bleeding? No   11. Have you ever had a blood transfusion? No   12. Are you  willing to have a blood transfusion if it is medically needed before, during, or after your surgery? Yes   13. Have you or any of your relatives ever had problems with anesthesia? No   14. Do you have sleep apnea, excessive snoring or daytime drowsiness? No   15. Do you have any artifical heart valves or other implanted medical devices like a pacemaker, defibrillator, or continuous glucose monitor? No   16. Do you have artificial joints? No   17. Are you allergic to latex? No     Health Care Directive:  Patient does not have a Health Care Directive or Living Will: Advance Directive received and scanned. Click on Code in the patient header to view.    Preoperative Review of :   reviewed - no record of controlled substances prescribed.    Status of Chronic Conditions:  ASTHMA - Patient has a longstanding history of moderate-severe Asthma . Patient has been doing well overall noting SOB and COUGH and continues on medication regimen consisting of bronchodilator without adverse reactions or side effects.     CAD - Patient has a longstanding history of moderate-severe CAD. Patient denies recent chest pain or NTG use, denies exercise induced dyspnea or PND. Last Stress test previous, EKG reviewed.     COPD - Patient has a longstanding history of moderate-severe COPD . Patient has been doing well overall noting SOB and COUGH and continues on medication regimen consisting of bronchodilatory without adverse reactions or side effects.    HYPERLIPIDEMIA - Patient has a long history of significant Hyperlipidemia requiring medication for treatment with recent good control. Patient reports no problems or side effects with the medication.       Review of Systems  Constitutional, neuro, ENT, endocrine, pulmonary, cardiac, gastrointestinal, genitourinary, musculoskeletal, integument and psychiatric systems are negative, except as otherwise noted.    Patient Active Problem List    Diagnosis Date Noted     COPD exacerbation (H)  06/07/2017     Priority: Medium     ACP (advance care planning) 01/25/2017     Priority: Medium     Advance Care Planning 1/25/2017: ACP Review of Chart / Resources Provided:  Reviewed chart for advance care plan.  Sheila Nuñez has been provided information and resources to begin or update their advance care plan.  Added by Celia Montenegro             COPD (chronic obstructive pulmonary disease) (H) 05/28/2013     Priority: Medium     Dyslipidemia 05/28/2013     Priority: Medium     Asthma, moderate persistent 01/03/2012     Priority: Medium     CHD (coronary heart disease) 07/27/2011     Priority: Medium     Tobacco abuse 07/27/2011     Priority: Medium      Past Medical History:   Diagnosis Date     Asthma 1/3/2012     CHD (coronary heart disease) 7/27/2011     Dyslipidemia 10/06/2011     Tobacco abuse 7/27/2011     Past Surgical History:   Procedure Laterality Date     ANGIOGRAM       stents       Current Outpatient Medications   Medication Sig Dispense Refill     albuterol (2.5 MG/3ML) 0.083% neb solution Take 1 vial (2.5 mg) by nebulization every 4 hours as needed for shortness of breath / dyspnea or wheezing 1 Box 11     amLODIPine (NORVASC) 5 MG tablet Take 1 tablet (5 mg) by mouth daily 60 tablet 3     clopidogrel (PLAVIX) 75 MG tablet Take 1 tablet by mouth once daily 90 tablet 0     FLAXSEED 2,000 mg 2 times daily        furosemide (LASIX) 20 MG tablet Take 1 tablet (20 mg) by mouth daily 30 tablet 0     Ipratropium-Albuterol (COMBIVENT RESPIMAT)  MCG/ACT inhaler Inhale 1 puff into the lungs 4 times daily Not to exceed 6 doses per day. 3 Inhaler 3     lovastatin (MEVACOR) 20 MG tablet TAKE 1 TABLET BY MOUTH ONCE DAILY WITH EVENING MEAL 90 tablet 3     metoprolol tartrate (LOPRESSOR) 50 MG tablet Take 1 tablet by mouth twice daily 60 tablet 3     ketorolac (ACULAR) 0.5 % ophthalmic solution INSTILL 1 DROP INTO LEFT EYE 4 TIMES DAILY FOR ONE MONTH PATIENT TO START DROPS 2 DAYS PRIOR TO PROCEDURE    "    ofloxacin (OCUFLOX) 0.3 % ophthalmic solution INSTILL 1 DROP INTO LEFT EYE 4 TIMES DAILY FOR 9 DAYS START DROPS TWO DAYS PRIOR TO PROCEDURE       prednisoLONE acetate (PRED FORTE) 1 % ophthalmic suspension INSTILL 1 DROP INTO LEFT EYE 4 TIMES DAILY FOR 1 MONTH PATIENT TO START DROPS AFTER SURGERY         Allergies   Allergen Reactions     Ciprofloxacin Other (See Comments)     Cipro  intolerance     Influenza Vaccines      Influenza virus vacc. Specific       Morphine Other (See Comments)     Becomes very mean     Nitrofurantoin Other (See Comments)     Macrobid  intolerance     Nitrofurantoin Macrocrystal Other (See Comments)     Macrobid  Intolerance          Social History     Tobacco Use     Smoking status: Light Tobacco Smoker     Packs/day: 0.10     Years: 20.00     Pack years: 2.00     Types: Cigarettes     Last attempt to quit: 2015     Years since quittin.2     Smokeless tobacco: Never Used     Tobacco comment: does not want quit plan. working on quitting now.   Substance Use Topics     Alcohol use: No     Alcohol/week: 0.0 standard drinks     Family History   Problem Relation Age of Onset     C.A.D. Father 63        cause of death     C.A.D. Mother 78        cause of death     History   Drug Use No         Objective     /62 (BP Location: Right arm, Patient Position: Sitting, Cuff Size: Adult Regular)   Pulse 88   Temp 97.8  F (36.6  C) (Tympanic)   Resp 20   Ht 1.588 m (5' 2.5\")   Wt 43.7 kg (96 lb 6 oz)   SpO2 (!) 87%   BMI 17.35 kg/m      Physical Exam    GENERAL APPEARANCE: healthy, alert and no distress     EYES: EOMI, PERRL     HENT: ear canals and TM's normal and nose and mouth without ulcers or lesions     NECK: no adenopathy, no asymmetry, masses, or scars and thyroid normal to palpation     RESP: lungs clear to auscultation - no rales, rhonchi or wheezes. Markedly decreased breath sounds bilaterally     CV: regular rates and rhythm, normal S1 S2, no S3 or S4 and no " murmur, click or rub     ABDOMEN:  soft, nontender, no HSM or masses and bowel sounds normal     MS: extremities normal- no gross deformities noted, no evidence of inflammation in joints, FROM in all extremities.     SKIN: no suspicious lesions or rashes     NEURO: Normal strength and tone, sensory exam grossly normal, mentation intact and speech normal     PSYCH: mentation appears normal. and affect normal/bright     LYMPHATICS: No cervical adenopathy    Recent Labs   Lab Test 01/27/20  0852   HGB 15.1         POTASSIUM 3.4   CR 0.59        Diagnostics:  Recent Results (from the past 240 hour(s))   CBC with platelets differential    Collection Time: 11/23/20 10:30 AM   Result Value Ref Range    WBC 9.5 4.0 - 11.0 10e9/L    RBC Count 5.03 3.8 - 5.2 10e12/L    Hemoglobin 14.9 11.7 - 15.7 g/dL    Hematocrit 45.1 35.0 - 47.0 %    MCV 90 78 - 100 fl    MCH 29.6 26.5 - 33.0 pg    MCHC 33.0 31.5 - 36.5 g/dL    RDW 13.5 10.0 - 15.0 %    Platelet Count 354 150 - 450 10e9/L    Diff Method Automated Method     % Neutrophils 65.7 %    % Lymphocytes 22.2 %    % Monocytes 9.5 %    % Eosinophils 1.8 %    % Basophils 0.5 %    % Immature Granulocytes 0.3 %    Nucleated RBCs 0 0 /100    Absolute Neutrophil 6.2 1.6 - 8.3 10e9/L    Absolute Lymphocytes 2.1 0.8 - 5.3 10e9/L    Absolute Monocytes 0.9 0.0 - 1.3 10e9/L    Absolute Eosinophils 0.2 0.0 - 0.7 10e9/L    Absolute Basophils 0.1 0.0 - 0.2 10e9/L    Abs Immature Granulocytes 0.0 0 - 0.4 10e9/L    Absolute Nucleated RBC 0.0    Basic metabolic panel    Collection Time: 11/23/20 10:30 AM   Result Value Ref Range    Sodium 136 133 - 144 mmol/L    Potassium 3.4 3.4 - 5.3 mmol/L    Chloride 100 94 - 109 mmol/L    Carbon Dioxide 31 20 - 32 mmol/L    Anion Gap 5 3 - 14 mmol/L    Glucose 98 70 - 99 mg/dL    Urea Nitrogen 9 7 - 30 mg/dL    Creatinine 0.60 0.52 - 1.04 mg/dL    GFR Estimate 89 >60 mL/min/[1.73_m2]    GFR Estimate If Black >90 >60 mL/min/[1.73_m2]    Calcium 9.5  8.5 - 10.1 mg/dL   Asymptomatic COVID-19 Virus (Coronavirus) by PCR    Collection Time: 11/27/20 11:12 AM    Specimen: Nasopharyngeal   Result Value Ref Range    COVID-19 Virus PCR to [facility] - Source Nasopharyngeal     COVID-19 Virus PCR to [facility] - Result Not Detected       No EKG required for low risk surgery (cataract, skin procedure, breast biopsy, etc).    Chest xray shows persistent infiltrates, may be progressive  Revised Cardiac Risk Index (RCRI):  The patient has the following serious cardiovascular risks for perioperative complications:   - Coronary Artery Disease (MI, positive stress test, angina, Qs on EKG) = 1 point     RCRI Interpretation: 1 point: Class II (low risk - 0.9% complication rate)       Assessment & Plan   The proposed surgical procedure is considered LOW risk.    Pre-operative general physical examination  Evaluation completed    Moderate protein-calorie malnutrition (H)  Weight is not stable    COPD (chronic obstructive pulmonary disease) (H)  Reviewed chest x ray.  She has no systemic symptoms.  Because of the low risk procedure she will be cleared and follow up as outpatient  - XR Chest 2 Views; Future    CHD (coronary heart disease)  Stable  - CBC with platelets differential  - EKG 12-lead complete w/read - Clinics  - Basic metabolic panel    Essential hypertension  Stable  - amLODIPine (NORVASC) 10 MG tablet; Take 1 tablet (10 mg) by mouth daily    Dyslipidemia  Stable         Risks and Recommendations:  The patient has the following additional risks and recommendations for perioperative complications:  Pulmonary:    - Incentive spirometry post-op   - Active nicotine user, advised smoking cessation      RECOMMENDATION:  APPROVAL GIVEN to proceed with proposed procedure, without further diagnostic evaluation.    Signed Electronically by: David Littlejohn MD    Copy of this evaluation report is provided to requesting physician.    [facility]    [facility]  Guidelines    Revised Cardiac Risk Index

## 2020-11-23 ENCOUNTER — ANCILLARY PROCEDURE (OUTPATIENT)
Dept: GENERAL RADIOLOGY | Facility: OTHER | Age: 75
End: 2020-11-23
Attending: FAMILY MEDICINE
Payer: COMMERCIAL

## 2020-11-23 ENCOUNTER — OFFICE VISIT (OUTPATIENT)
Dept: FAMILY MEDICINE | Facility: OTHER | Age: 75
End: 2020-11-23
Attending: FAMILY MEDICINE
Payer: COMMERCIAL

## 2020-11-23 VITALS
HEART RATE: 88 BPM | HEIGHT: 63 IN | DIASTOLIC BLOOD PRESSURE: 62 MMHG | RESPIRATION RATE: 20 BRPM | BODY MASS INDEX: 17.08 KG/M2 | WEIGHT: 96.38 LBS | OXYGEN SATURATION: 87 % | SYSTOLIC BLOOD PRESSURE: 138 MMHG | TEMPERATURE: 97.8 F

## 2020-11-23 DIAGNOSIS — E44.0 MODERATE PROTEIN-CALORIE MALNUTRITION (H): ICD-10-CM

## 2020-11-23 DIAGNOSIS — I10 ESSENTIAL HYPERTENSION: ICD-10-CM

## 2020-11-23 DIAGNOSIS — E78.2 MIXED HYPERLIPIDEMIA: ICD-10-CM

## 2020-11-23 DIAGNOSIS — I25.10 CORONARY ARTERY DISEASE INVOLVING NATIVE CORONARY ARTERY OF NATIVE HEART WITHOUT ANGINA PECTORIS: ICD-10-CM

## 2020-11-23 DIAGNOSIS — Z01.818 PRE-OPERATIVE GENERAL PHYSICAL EXAMINATION: Primary | ICD-10-CM

## 2020-11-23 DIAGNOSIS — J43.1 PANLOBULAR EMPHYSEMA (H): ICD-10-CM

## 2020-11-23 PROBLEM — E46 PROTEIN-CALORIE MALNUTRITION (H): Status: ACTIVE | Noted: 2020-11-23

## 2020-11-23 LAB
ANION GAP SERPL CALCULATED.3IONS-SCNC: 5 MMOL/L (ref 3–14)
BASOPHILS # BLD AUTO: 0.1 10E9/L (ref 0–0.2)
BASOPHILS NFR BLD AUTO: 0.5 %
BUN SERPL-MCNC: 9 MG/DL (ref 7–30)
CALCIUM SERPL-MCNC: 9.5 MG/DL (ref 8.5–10.1)
CHLORIDE SERPL-SCNC: 100 MMOL/L (ref 94–109)
CO2 SERPL-SCNC: 31 MMOL/L (ref 20–32)
CREAT SERPL-MCNC: 0.6 MG/DL (ref 0.52–1.04)
DIFFERENTIAL METHOD BLD: NORMAL
EOSINOPHIL # BLD AUTO: 0.2 10E9/L (ref 0–0.7)
EOSINOPHIL NFR BLD AUTO: 1.8 %
ERYTHROCYTE [DISTWIDTH] IN BLOOD BY AUTOMATED COUNT: 13.5 % (ref 10–15)
GFR SERPL CREATININE-BSD FRML MDRD: 89 ML/MIN/{1.73_M2}
GLUCOSE SERPL-MCNC: 98 MG/DL (ref 70–99)
HCT VFR BLD AUTO: 45.1 % (ref 35–47)
HGB BLD-MCNC: 14.9 G/DL (ref 11.7–15.7)
IMM GRANULOCYTES # BLD: 0 10E9/L (ref 0–0.4)
IMM GRANULOCYTES NFR BLD: 0.3 %
LYMPHOCYTES # BLD AUTO: 2.1 10E9/L (ref 0.8–5.3)
LYMPHOCYTES NFR BLD AUTO: 22.2 %
MCH RBC QN AUTO: 29.6 PG (ref 26.5–33)
MCHC RBC AUTO-ENTMCNC: 33 G/DL (ref 31.5–36.5)
MCV RBC AUTO: 90 FL (ref 78–100)
MONOCYTES # BLD AUTO: 0.9 10E9/L (ref 0–1.3)
MONOCYTES NFR BLD AUTO: 9.5 %
NEUTROPHILS # BLD AUTO: 6.2 10E9/L (ref 1.6–8.3)
NEUTROPHILS NFR BLD AUTO: 65.7 %
NRBC # BLD AUTO: 0 10*3/UL
NRBC BLD AUTO-RTO: 0 /100
PLATELET # BLD AUTO: 354 10E9/L (ref 150–450)
POTASSIUM SERPL-SCNC: 3.4 MMOL/L (ref 3.4–5.3)
RBC # BLD AUTO: 5.03 10E12/L (ref 3.8–5.2)
SODIUM SERPL-SCNC: 136 MMOL/L (ref 133–144)
WBC # BLD AUTO: 9.5 10E9/L (ref 4–11)

## 2020-11-23 PROCEDURE — 99214 OFFICE O/P EST MOD 30 MIN: CPT | Mod: 25 | Performed by: FAMILY MEDICINE

## 2020-11-23 PROCEDURE — 80048 BASIC METABOLIC PNL TOTAL CA: CPT | Performed by: FAMILY MEDICINE

## 2020-11-23 PROCEDURE — 85025 COMPLETE CBC W/AUTO DIFF WBC: CPT | Performed by: FAMILY MEDICINE

## 2020-11-23 PROCEDURE — 71046 X-RAY EXAM CHEST 2 VIEWS: CPT | Mod: TC | Performed by: RADIOLOGY

## 2020-11-23 PROCEDURE — 36415 COLL VENOUS BLD VENIPUNCTURE: CPT | Performed by: FAMILY MEDICINE

## 2020-11-23 PROCEDURE — 93000 ELECTROCARDIOGRAM COMPLETE: CPT | Performed by: INTERNAL MEDICINE

## 2020-11-23 RX ORDER — KETOROLAC TROMETHAMINE 5 MG/ML
SOLUTION OPHTHALMIC
COMMUNITY
Start: 2020-11-05 | End: 2022-01-17

## 2020-11-23 RX ORDER — OFLOXACIN 3 MG/ML
SOLUTION/ DROPS OPHTHALMIC
COMMUNITY
Start: 2020-11-05 | End: 2022-01-17

## 2020-11-23 RX ORDER — PREDNISOLONE ACETATE 10 MG/ML
SUSPENSION/ DROPS OPHTHALMIC
COMMUNITY
Start: 2020-11-05 | End: 2022-01-17

## 2020-11-23 RX ORDER — AMLODIPINE BESYLATE 10 MG/1
10 TABLET ORAL DAILY
Qty: 90 TABLET | Refills: 1 | Status: SHIPPED | OUTPATIENT
Start: 2020-11-23 | End: 2021-07-06

## 2020-11-23 ASSESSMENT — PAIN SCALES - GENERAL: PAINLEVEL: NO PAIN (0)

## 2020-11-23 ASSESSMENT — MIFFLIN-ST. JEOR: SCORE: 893.34

## 2020-11-23 NOTE — NURSING NOTE
"Chief Complaint   Patient presents with     Pre-Op Exam       Initial /62 (BP Location: Right arm, Patient Position: Sitting, Cuff Size: Adult Regular)   Pulse 88   Temp 97.8  F (36.6  C) (Tympanic)   Resp 20   Ht 1.588 m (5' 2.5\")   Wt 43.7 kg (96 lb 6 oz)   SpO2 (!) 87%   BMI 17.35 kg/m   Estimated body mass index is 17.35 kg/m  as calculated from the following:    Height as of this encounter: 1.588 m (5' 2.5\").    Weight as of this encounter: 43.7 kg (96 lb 6 oz).  Medication Reconciliation: complete  Nery Rawls LPN  "

## 2020-11-27 ENCOUNTER — OFFICE VISIT (OUTPATIENT)
Dept: FAMILY MEDICINE | Facility: OTHER | Age: 75
End: 2020-11-27
Attending: FAMILY MEDICINE
Payer: COMMERCIAL

## 2020-11-27 ENCOUNTER — TELEPHONE (OUTPATIENT)
Dept: FAMILY MEDICINE | Facility: OTHER | Age: 75
End: 2020-11-27

## 2020-11-27 ENCOUNTER — ANESTHESIA EVENT (OUTPATIENT)
Dept: SURGERY | Facility: HOSPITAL | Age: 75
End: 2020-11-27

## 2020-11-27 DIAGNOSIS — Z20.822 COVID-19 RULED OUT: Primary | ICD-10-CM

## 2020-11-27 PROCEDURE — U0003 INFECTIOUS AGENT DETECTION BY NUCLEIC ACID (DNA OR RNA); SEVERE ACUTE RESPIRATORY SYNDROME CORONAVIRUS 2 (SARS-COV-2) (CORONAVIRUS DISEASE [COVID-19]), AMPLIFIED PROBE TECHNIQUE, MAKING USE OF HIGH THROUGHPUT TECHNOLOGIES AS DESCRIBED BY CMS-2020-01-R: HCPCS | Performed by: FAMILY MEDICINE

## 2020-11-27 RX ORDER — ONDANSETRON 4 MG/1
4 TABLET, ORALLY DISINTEGRATING ORAL EVERY 30 MIN PRN
Status: CANCELLED | OUTPATIENT
Start: 2020-11-27

## 2020-11-27 RX ORDER — NALOXONE HYDROCHLORIDE 0.4 MG/ML
0.4 INJECTION, SOLUTION INTRAMUSCULAR; INTRAVENOUS; SUBCUTANEOUS
Status: CANCELLED | OUTPATIENT
Start: 2020-11-27 | End: 2020-11-28

## 2020-11-27 RX ORDER — NALOXONE HYDROCHLORIDE 0.4 MG/ML
0.2 INJECTION, SOLUTION INTRAMUSCULAR; INTRAVENOUS; SUBCUTANEOUS
Status: CANCELLED | OUTPATIENT
Start: 2020-11-27 | End: 2020-11-28

## 2020-11-27 RX ORDER — LIDOCAINE 40 MG/G
CREAM TOPICAL
Status: CANCELLED | OUTPATIENT
Start: 2020-11-27

## 2020-11-27 RX ORDER — ONDANSETRON 2 MG/ML
4 INJECTION INTRAMUSCULAR; INTRAVENOUS EVERY 30 MIN PRN
Status: CANCELLED | OUTPATIENT
Start: 2020-11-27

## 2020-11-27 ASSESSMENT — LIFESTYLE VARIABLES: TOBACCO_USE: 1

## 2020-11-27 ASSESSMENT — COPD QUESTIONNAIRES: COPD: 1

## 2020-11-27 NOTE — ANESTHESIA PREPROCEDURE EVALUATION
Anesthesia Pre-Procedure Evaluation    Patient: Sheila Nuñez   MRN: [id number] : 1945          Preoperative Diagnosis: Cataract of left eye, unspecified cataract type [H26.9]    Procedure(s):  CATARACT EXTRACTION WITH INTRA OCULAR LENS LEFT EYE    Past Medical History:   Diagnosis Date     Asthma 1/3/2012     CHD (coronary heart disease) 2011     Dyslipidemia 10/06/2011     Tobacco abuse 2011     Past Surgical History:   Procedure Laterality Date     ANGIOGRAM       stents         Anesthesia Evaluation     . Pt has had prior anesthetic.            ROS/MED HX    ENT/Pulmonary:     (+)tobacco use, Current use asthma COPD, , . .    Neurologic:       Cardiovascular: Comment: Unknown when and number of stents placement    (+) Dyslipidemia, --CAD, --stent,. : . . . :. . Previous cardiac testing date:results:date: results: date:20 results:SR with septal infarct age undetermined date: results:          METS/Exercise Tolerance:     Hematologic:         Musculoskeletal:         GI/Hepatic:         Renal/Genitourinary:         Endo:         Psychiatric:         Infectious Disease:         Malignancy:         Other:                                 Lab Results   Component Value Date    WBC 9.5 2020    HGB 14.9 2020    HCT 45.1 2020     2020     2020    POTASSIUM 3.4 2020    CHLORIDE 100 2020    CO2 31 2020    BUN 9 2020    CR 0.60 2020    GLC 98 2020    MARIELA 9.5 2020    MAG 2.1 2015    ALBUMIN 3.6 2020    PROTTOTAL 7.5 2020    ALT 13 2020    AST 12 2020    ALKPHOS 125 2020    BILITOTAL 0.7 2020    TSH 0.09 (L) 2020    T4 1.35 2020    T3 116 10/01/2014       Preop Vitals  BP Readings from Last 3 Encounters:   20 138/62   20 130/72   20 136/78    Pulse Readings from Last 3 Encounters:   20 88   20 78   20 78      Resp Readings from  "Last 3 Encounters:   11/23/20 20   02/28/20 24   01/24/20 22    SpO2 Readings from Last 3 Encounters:   11/23/20 (!) 87%   05/07/20 90%   04/30/20 91%      Temp Readings from Last 1 Encounters:   11/23/20 97.8  F (36.6  C) (Tympanic)    Ht Readings from Last 1 Encounters:   11/23/20 1.588 m (5' 2.5\")      Wt Readings from Last 1 Encounters:   11/23/20 43.7 kg (96 lb 6 oz)    Estimated body mass index is 17.35 kg/m  as calculated from the following:    Height as of 11/23/20: 1.588 m (5' 2.5\").    Weight as of 11/23/20: 43.7 kg (96 lb 6 oz).       Anesthesia Plan      History & Physical Review      ASA Status:  3 .        Plan for MAC Reason for MAC:  Chronic cardiopulmonary disease (G9) and Procedure to face, neck, head or breast    H and P 11-23-20        Postoperative Care      Consents                 Zane Murry, JOSUE CRNA  "

## 2020-11-27 NOTE — TELEPHONE ENCOUNTER
Pt called to ask if she should stop taking her Plavix rx prior to her eye surgery on December 3rd. Dr. Wilson requested her to ask.    [phone]    Aniyah Alvarez MA on 11/27/2020 at 11:23 AM

## 2020-11-28 LAB
SARS-COV-2 RNA SPEC QL NAA+PROBE: NOT DETECTED
SPECIMEN SOURCE: NORMAL

## 2020-11-30 ENCOUNTER — TELEPHONE (OUTPATIENT)
Dept: FAMILY MEDICINE | Facility: OTHER | Age: 75
End: 2020-11-30

## 2020-12-03 ENCOUNTER — ANESTHESIA (OUTPATIENT)
Dept: SURGERY | Facility: HOSPITAL | Age: 75
End: 2020-12-03

## 2020-12-28 DIAGNOSIS — I10 BENIGN ESSENTIAL HYPERTENSION: ICD-10-CM

## 2020-12-28 DIAGNOSIS — I25.810 CORONARY ARTERY DISEASE INVOLVING CORONARY BYPASS GRAFT OF NATIVE HEART WITHOUT ANGINA PECTORIS: ICD-10-CM

## 2020-12-28 RX ORDER — METOPROLOL TARTRATE 50 MG
TABLET ORAL
Qty: 60 TABLET | Refills: 0 | Status: SHIPPED | OUTPATIENT
Start: 2020-12-28 | End: 2021-01-26

## 2020-12-28 RX ORDER — CLOPIDOGREL BISULFATE 75 MG/1
TABLET ORAL
Qty: 90 TABLET | Refills: 0 | Status: SHIPPED | OUTPATIENT
Start: 2020-12-28 | End: 2021-03-30

## 2020-12-28 NOTE — TELEPHONE ENCOUNTER
metoprolol      Last Written Prescription Date: 7/15/20   Last Fill Quantity: 60,   # refills: 3  Last Office Visit: 11/23/20  Future Office visit:         plavix      Last Written Prescription Date:  9/28/20  Last Fill Quantity: 90,   # refills: 0

## 2020-12-30 ENCOUNTER — ANESTHESIA EVENT (OUTPATIENT)
Dept: SURGERY | Facility: HOSPITAL | Age: 75
End: 2020-12-30
Payer: COMMERCIAL

## 2020-12-30 RX ORDER — NALOXONE HYDROCHLORIDE 0.4 MG/ML
0.2 INJECTION, SOLUTION INTRAMUSCULAR; INTRAVENOUS; SUBCUTANEOUS
Status: CANCELLED | OUTPATIENT
Start: 2020-12-30 | End: 2020-12-31

## 2020-12-30 RX ORDER — SODIUM CHLORIDE, SODIUM LACTATE, POTASSIUM CHLORIDE, CALCIUM CHLORIDE 600; 310; 30; 20 MG/100ML; MG/100ML; MG/100ML; MG/100ML
INJECTION, SOLUTION INTRAVENOUS CONTINUOUS
Status: CANCELLED | OUTPATIENT
Start: 2020-12-30

## 2020-12-30 RX ORDER — NALOXONE HYDROCHLORIDE 0.4 MG/ML
0.4 INJECTION, SOLUTION INTRAMUSCULAR; INTRAVENOUS; SUBCUTANEOUS
Status: CANCELLED | OUTPATIENT
Start: 2020-12-30 | End: 2020-12-31

## 2020-12-30 RX ORDER — ONDANSETRON 4 MG/1
4 TABLET, ORALLY DISINTEGRATING ORAL EVERY 30 MIN PRN
Status: CANCELLED | OUTPATIENT
Start: 2020-12-30

## 2020-12-30 RX ORDER — ONDANSETRON 2 MG/ML
4 INJECTION INTRAMUSCULAR; INTRAVENOUS EVERY 30 MIN PRN
Status: CANCELLED | OUTPATIENT
Start: 2020-12-30

## 2020-12-30 ASSESSMENT — LIFESTYLE VARIABLES: TOBACCO_USE: 1

## 2020-12-30 ASSESSMENT — COPD QUESTIONNAIRES: COPD: 1

## 2020-12-30 NOTE — ANESTHESIA PREPROCEDURE EVALUATION
Anesthesia Pre-Procedure Evaluation    Patient: Sheila Nuñez   MRN: [id number] : 1945          Preoperative Diagnosis: Cataract of left eye, unspecified cataract type [H26.9]    Procedure(s):  CATARACT EXTRACTION WITH INTRA OCULAR LENS LEFT EYE    Past Medical History:   Diagnosis Date     Asthma 1/3/2012     CHD (coronary heart disease) 2011     Dyslipidemia 10/06/2011     Tobacco abuse 2011     Past Surgical History:   Procedure Laterality Date     ANGIOGRAM       stents         Anesthesia Evaluation     . Pt has had prior anesthetic. Type: General           ROS/MED HX    ENT/Pulmonary:     (+)tobacco use, Current use asthma severe COPD, O2 dependent, 1 liters/min,  , . .    Neurologic:  - neg neurologic ROS     Cardiovascular: Comment: Stents placed  per patient report, unknown which vessels    (+) Dyslipidemia, --CAD, --stent,. : . . . :. . Previous cardiac testing date:results:date: results: date:20 results:SR with septal infarct age undetermined  Possible BBB date: results:          METS/Exercise Tolerance:  3 - Able to walk 1-2 blocks without stopping   Hematologic:  - neg hematologic  ROS       Musculoskeletal:  - neg musculoskeletal ROS       GI/Hepatic:  - neg GI/hepatic ROS       Renal/Genitourinary:  - ROS Renal section negative       Endo:  - neg endo ROS       Psychiatric:  - neg psychiatric ROS       Infectious Disease:  - neg infectious disease ROS       Malignancy:      - no malignancy   Other:    - neg other ROS                        Physical Exam  Normal systems: cardiovascular    Airway   Mallampati: I  TM distance: >3 FB  Neck ROM: full    Dental   (+) lower dentures and upper dentures    Cardiovascular   Rhythm and rate: regular and normal      Pulmonary (+) rhonchi and decreased breath sounds               Lab Results   Component Value Date    WBC 9.5 2020    HGB 14.9 2020    HCT 45.1 2020     2020     2020     "POTASSIUM 3.4 11/23/2020    CHLORIDE 100 11/23/2020    CO2 31 11/23/2020    BUN 9 11/23/2020    CR 0.60 11/23/2020    GLC 98 11/23/2020    MARIELA 9.5 11/23/2020    MAG 2.1 09/03/2015    ALBUMIN 3.6 01/27/2020    PROTTOTAL 7.5 01/27/2020    ALT 13 01/27/2020    AST 12 01/27/2020    ALKPHOS 125 01/27/2020    BILITOTAL 0.7 01/27/2020    TSH 0.09 (L) 01/27/2020    T4 1.35 01/27/2020    T3 116 10/01/2014       Preop Vitals  BP Readings from Last 3 Encounters:   11/23/20 138/62   05/07/20 130/72   04/30/20 136/78    Pulse Readings from Last 3 Encounters:   11/23/20 88   05/07/20 78   04/30/20 78      Resp Readings from Last 3 Encounters:   11/23/20 20   02/28/20 24   01/24/20 22    SpO2 Readings from Last 3 Encounters:   11/23/20 (!) 87%   05/07/20 90%   04/30/20 91%      Temp Readings from Last 1 Encounters:   11/23/20 97.8  F (36.6  C) (Tympanic)    Ht Readings from Last 1 Encounters:   11/23/20 1.588 m (5' 2.5\")      Wt Readings from Last 1 Encounters:   11/23/20 43.7 kg (96 lb 6 oz)    Estimated body mass index is 17.35 kg/m  as calculated from the following:    Height as of 11/23/20: 1.588 m (5' 2.5\").    Weight as of 11/23/20: 43.7 kg (96 lb 6 oz).       Anesthesia Plan      History & Physical Review  History and physical reviewed and following examination; no interval change.    ASA Status:  4 .    NPO Status:  > 8 hours    Plan for MAC with Other induction. Maintenance will be Other.  Reason for MAC:  Procedure to face, neck, head or breast    Risks and benefits of MAC anesthetic discussed including dental damage, aspiration, loss of airway, conversion to general anesthetic, CV complications, MI, stroke, death. Pt wishes to proceed.         Postoperative Care      Consents  Anesthetic plan, risks, benefits and alternatives discussed with:  Patient..                   JOSUE Marmolejo CRNA  "

## 2021-01-02 ENCOUNTER — OFFICE VISIT (OUTPATIENT)
Dept: FAMILY MEDICINE | Facility: OTHER | Age: 76
End: 2021-01-02
Attending: FAMILY MEDICINE
Payer: COMMERCIAL

## 2021-01-02 DIAGNOSIS — Z20.822 COVID-19 RULED OUT: Primary | ICD-10-CM

## 2021-01-02 LAB
SARS-COV-2 RNA SPEC QL NAA+PROBE: NORMAL
SPECIMEN SOURCE: NORMAL

## 2021-01-02 PROCEDURE — U0003 INFECTIOUS AGENT DETECTION BY NUCLEIC ACID (DNA OR RNA); SEVERE ACUTE RESPIRATORY SYNDROME CORONAVIRUS 2 (SARS-COV-2) (CORONAVIRUS DISEASE [COVID-19]), AMPLIFIED PROBE TECHNIQUE, MAKING USE OF HIGH THROUGHPUT TECHNOLOGIES AS DESCRIBED BY CMS-2020-01-R: HCPCS | Performed by: FAMILY MEDICINE

## 2021-01-02 PROCEDURE — U0005 INFEC AGEN DETEC AMPLI PROBE: HCPCS | Performed by: FAMILY MEDICINE

## 2021-01-02 PROCEDURE — 99207 PR NO CHARGE NURSE ONLY: CPT

## 2021-01-03 LAB
LABORATORY COMMENT REPORT: NORMAL
SARS-COV-2 RNA SPEC QL NAA+PROBE: NEGATIVE
SPECIMEN SOURCE: NORMAL

## 2021-01-07 ENCOUNTER — ANESTHESIA (OUTPATIENT)
Dept: SURGERY | Facility: HOSPITAL | Age: 76
End: 2021-01-07
Payer: COMMERCIAL

## 2021-01-07 ENCOUNTER — HOSPITAL ENCOUNTER (OUTPATIENT)
Facility: HOSPITAL | Age: 76
Discharge: HOME | End: 2021-01-07
Attending: OPHTHALMOLOGY | Admitting: OPHTHALMOLOGY
Payer: COMMERCIAL

## 2021-01-07 VITALS
RESPIRATION RATE: 22 BRPM | OXYGEN SATURATION: 86 % | SYSTOLIC BLOOD PRESSURE: 130 MMHG | WEIGHT: 95 LBS | HEART RATE: 85 BPM | HEIGHT: 62 IN | TEMPERATURE: 99 F | DIASTOLIC BLOOD PRESSURE: 84 MMHG | BODY MASS INDEX: 17.48 KG/M2

## 2021-01-07 PROCEDURE — 99100 ANES PT EXTEME AGE<1 YR&>70: CPT | Performed by: NURSE ANESTHETIST, CERTIFIED REGISTERED

## 2021-01-07 PROCEDURE — 66984 XCAPSL CTRC RMVL W/O ECP: CPT | Performed by: NURSE ANESTHETIST, CERTIFIED REGISTERED

## 2021-01-07 PROCEDURE — 250N000009 HC RX 250: Performed by: OPHTHALMOLOGY

## 2021-01-07 PROCEDURE — 999N000138 HC STATISTIC PRE-PROCEDURE ASSESSMENT I: Performed by: OPHTHALMOLOGY

## 2021-01-07 PROCEDURE — 360N000076 HC SURGERY LEVEL 3, PER MIN: Performed by: OPHTHALMOLOGY

## 2021-01-07 PROCEDURE — 272N000001 HC OR GENERAL SUPPLY STERILE: Performed by: OPHTHALMOLOGY

## 2021-01-07 PROCEDURE — 370N000017 HC ANESTHESIA TECHNICAL FEE, PER MIN: Performed by: OPHTHALMOLOGY

## 2021-01-07 PROCEDURE — 710N000012 HC RECOVERY PHASE 2, PER MINUTE: Performed by: OPHTHALMOLOGY

## 2021-01-07 PROCEDURE — V2632 POST CHMBR INTRAOCULAR LENS: HCPCS | Performed by: OPHTHALMOLOGY

## 2021-01-07 PROCEDURE — 250N000011 HC RX IP 250 OP 636: Performed by: OPHTHALMOLOGY

## 2021-01-07 DEVICE — IMPLANTABLE DEVICE: Type: IMPLANTABLE DEVICE | Site: EYE | Status: FUNCTIONAL

## 2021-01-07 RX ORDER — LIDOCAINE 40 MG/G
CREAM TOPICAL
Status: DISCONTINUED | OUTPATIENT
Start: 2021-01-07 | End: 2021-01-07 | Stop reason: HOSPADM

## 2021-01-07 RX ORDER — PROPARACAINE HYDROCHLORIDE 5 MG/ML
1 SOLUTION/ DROPS OPHTHALMIC ONCE
Status: COMPLETED | OUTPATIENT
Start: 2021-01-07 | End: 2021-01-07

## 2021-01-07 RX ORDER — TOBRAMYCIN 3 MG/ML
1 SOLUTION/ DROPS OPHTHALMIC
Status: DISCONTINUED | OUTPATIENT
Start: 2021-01-07 | End: 2021-01-07 | Stop reason: HOSPADM

## 2021-01-07 RX ORDER — CYCLOPENTOLATE HYDROCHLORIDE 10 MG/ML
1 SOLUTION/ DROPS OPHTHALMIC
Status: COMPLETED | OUTPATIENT
Start: 2021-01-07 | End: 2021-01-07

## 2021-01-07 RX ORDER — PHENYLEPHRINE HYDROCHLORIDE 25 MG/ML
1 SOLUTION/ DROPS OPHTHALMIC
Status: COMPLETED | OUTPATIENT
Start: 2021-01-07 | End: 2021-01-07

## 2021-01-07 RX ORDER — PREDNISOLONE ACETATE 10 MG/ML
SUSPENSION/ DROPS OPHTHALMIC PRN
Status: DISCONTINUED | OUTPATIENT
Start: 2021-01-07 | End: 2021-01-07 | Stop reason: HOSPADM

## 2021-01-07 RX ORDER — TETRACAINE HYDROCHLORIDE 5 MG/ML
SOLUTION OPHTHALMIC PRN
Status: DISCONTINUED | OUTPATIENT
Start: 2021-01-07 | End: 2021-01-07 | Stop reason: HOSPADM

## 2021-01-07 RX ADMIN — CYCLOPENTOLATE HYDROCHLORIDE 1 DROP: 10 SOLUTION OPHTHALMIC at 09:12

## 2021-01-07 RX ADMIN — PROPARACAINE HYDROCHLORIDE 1 DROP: 5 SOLUTION/ DROPS OPHTHALMIC at 09:12

## 2021-01-07 RX ADMIN — PHENYLEPHRINE HYDROCHLORIDE 1 DROP: 25 SOLUTION/ DROPS OPHTHALMIC at 09:13

## 2021-01-07 RX ADMIN — TOBRAMYCIN 0.5 ML: 3 SOLUTION/ DROPS OPHTHALMIC at 09:53

## 2021-01-07 RX ADMIN — TOBRAMYCIN 1 DROP: 3 SOLUTION/ DROPS OPHTHALMIC at 09:48

## 2021-01-07 RX ADMIN — CYCLOPENTOLATE HYDROCHLORIDE 1 DROP: 10 SOLUTION OPHTHALMIC at 09:29

## 2021-01-07 RX ADMIN — PHENYLEPHRINE HYDROCHLORIDE 1 DROP: 25 SOLUTION/ DROPS OPHTHALMIC at 09:31

## 2021-01-07 ASSESSMENT — COPD QUESTIONNAIRES: CAT_SEVERITY: SEVERE

## 2021-01-07 ASSESSMENT — MIFFLIN-ST. JEOR: SCORE: 879.17

## 2021-01-07 NOTE — OR NURSING
"Patient and responsible adult given discharge instructions with no questions regarding instructions. Deuce score 20. Pain level 0/10.  Discharged from unit via ambulatory. Patient discharged to home.Discussed O2 saturations. Patient stated mid 80\"s to 90 is normal for her. Offered wheel chair upon leaving. Patient refused and requested to walk. Tolerated well. No increase shortness of breath    "

## 2021-01-07 NOTE — ANESTHESIA POSTPROCEDURE EVALUATION
Patient: Sheila Nuñez    Procedure(s):  Phacoemulsification of cataract with intraocular lens placement, left    Diagnosis:Cataract of left eye, unspecified cataract type [H26.9]  Diagnosis Additional Information: No value filed.    Anesthesia Type:  MAC    Note:  Anesthesia Post Evaluation    Patient location during evaluation: Bedside and Phase 2  Patient participation: Able to fully participate in evaluation  Level of consciousness: awake and alert  Pain management: adequate  Airway patency: patent  Cardiovascular status: acceptable  Respiratory status: acceptable  Hydration status: acceptable  PONV: none     Anesthetic complications: None          Last vitals:  Vitals:    01/07/21 1107 01/07/21 1110 01/07/21 1120   BP: 132/86 117/91 130/84   Pulse: 78 81 85   Resp:  22    Temp:      SpO2: (!) 85%  (!) 86%         Electronically Signed By: JOSUE Lundberg CRNA  January 7, 2021  11:39 AM

## 2021-01-07 NOTE — DISCHARGE INSTRUCTIONS
Post-Anesthesia Patient Instructions    IMMEDIATELY FOLLOWING SURGERY:  Do not drive or operate machinery for the first twenty four hours after surgery.  Do not make any important decisions for twenty four hours after surgery or while taking narcotic pain medications or sedatives.  If you develop intractable nausea and vomiting or a severe headache please notify your doctor immediately.    FOLLOW-UP:  Please make an appointment with your surgeon as instructed. You do not need to follow up with anesthesia unless specifically instructed to do so.    WOUND CARE INSTRUCTIONS (if applicable):  Keep a dry clean dressing on the anesthesia/puncture wound site if there is drainage.  Once the wound has quit draining you may leave it open to air.  Generally you should leave the bandage intact for twenty four hours unless there is drainage.  If the epidural site drains for more than 36-48 hours please call the anesthesia department.    QUESTIONS?:  Please feel free to call your physician or the hospital  if you have any questions, and they will be happy to assist you.         AFTER CATARACT SURGERY RESTRICTIONS    SHIELD: WEAR OVER SURGICAL EYE AT NIGHT FOR 1 WEEK    ACTIVITY/LIFTING: Avoid activities, which increase abdominal/head pressure such as pulling on a starter cord, heavy lifting (over 15-20 lbs) or bending with the head below the waist, for 1 week. Avoid sudden jerky movements (chopping, shoveling) for 1 week. Waking and lower body exercise such as biking is okay.     WATER: Showering/washing hair is okay the day after surgery, but avoid excess water, soap, or shampoo in your eyes. Clean eyelids gently with a clean, wet washcloth as needed. Avoid pressure on the eye.     SUNLIGHT: If the eye is light sensitive after surgery, dark glasses may be worn.     DIET/MEDICATIONS: Resume your usual diet and medications your family doctor has prescribed. Continue to use/follow the instructions for your eye drops.      DRIVING: Avoid driving with a patch. Resume driving when you feel safe, but don't drive on the day of surgery.    PAIN: Minor pain and itching is normal during healing. DO NOT RUB THE EYE! Report any unusual swelling, increased discharge or pain that is not relieved by Tylenol (or equivalent). If sudden drop/change in vision call immediately. [facility] [phone]    CONTINUE TO USE ALL THE EYE DROPS PER THE INSTRUCTIONS ORDERED BY DR. LLANOS'S TECHNICIANS    FOR EMERGENCY DR. VOGT: [phone]    FOLLOW EYE DROP INSTRUCTIONS GIVEN BY 's OFFICE

## 2021-01-07 NOTE — H&P
[facility]    History and Physical  Opthamology     Date of Admission:  2021    Assessment & Plan   Sheila Nuñez is a 75 year old female who presents for cataract surgery        Fracisco Yarbrough    Code Status   Full Code    Primary Care Physician   David Littlejohn    Chief Complaint   none    History is obtained from the patient    Past Medical History    I have reviewed this patient's medical history and updated it with pertinent information if needed.   Past Medical History:   Diagnosis Date     Asthma 1/3/2012     CHD (coronary heart disease) 2011     Dyslipidemia 10/06/2011     Tobacco abuse 2011       Past Surgical History   I have reviewed this patient's surgical history and updated it with pertinent information if needed.  Past Surgical History:   Procedure Laterality Date     ANGIOGRAM       stents         Prior to Admission Medications   Prior to Admission Medications   Prescriptions Last Dose Informant Patient Reported? Taking?   FLAXSEED 2021 at Unknown time Self Yes Yes   Si,000 mg 2 times daily    Ipratropium-Albuterol (COMBIVENT RESPIMAT)  MCG/ACT inhaler More than a month at Unknown time Self No No   Sig: Inhale 1 puff into the lungs 4 times daily Not to exceed 6 doses per day.   albuterol (2.5 MG/3ML) 0.083% neb solution Past Month at Unknown time Self No Yes   Sig: Take 1 vial (2.5 mg) by nebulization every 4 hours as needed for shortness of breath / dyspnea or wheezing   amLODIPine (NORVASC) 10 MG tablet 2021 at Unknown time  No Yes   Sig: Take 1 tablet (10 mg) by mouth daily   clopidogrel (PLAVIX) 75 MG tablet Past Week at Unknown time  No Yes   Sig: Take 1 tablet by mouth once daily   furosemide (LASIX) 20 MG tablet Unknown at Unknown time Self No No   Sig: Take 1 tablet (20 mg) by mouth daily   ketorolac (ACULAR) 0.5 % ophthalmic solution 2021 at Unknown time Self Yes Yes   Sig: INSTILL 1 DROP INTO LEFT EYE 4 TIMES DAILY FOR ONE MONTH  PATIENT TO START DROPS 2 DAYS PRIOR TO PROCEDURE   lovastatin (MEVACOR) 20 MG tablet 1/7/2021 at Unknown time Self No Yes   Sig: TAKE 1 TABLET BY MOUTH ONCE DAILY WITH EVENING MEAL   metoprolol tartrate (LOPRESSOR) 50 MG tablet 1/7/2021 at 0500  No Yes   Sig: Take 1 tablet by mouth twice daily   ofloxacin (OCUFLOX) 0.3 % ophthalmic solution 1/6/2021 at Unknown time Self Yes Yes   Sig: INSTILL 1 DROP INTO LEFT EYE 4 TIMES DAILY FOR 9 DAYS START DROPS TWO DAYS PRIOR TO PROCEDURE   prednisoLONE acetate (PRED FORTE) 1 % ophthalmic suspension  Self Yes No   Sig: INSTILL 1 DROP INTO LEFT EYE 4 TIMES DAILY FOR 1 MONTH PATIENT TO START DROPS AFTER SURGERY      Facility-Administered Medications: None     Allergies   Allergies   Allergen Reactions     Ciprofloxacin Other (See Comments)     Cipro  intolerance     Influenza Vaccines      Influenza virus vacc. Specific       Morphine Other (See Comments)     Becomes very mean     Nitrofurantoin Other (See Comments)     Macrobid  intolerance     Nitrofurantoin Macrocrystal Other (See Comments)     Macrobid  Intolerance         Social History   I have reviewed this patient's social history and updated it with pertinent information if needed. Sheila Nuñez  reports that she has been smoking cigarettes. She has a 2.00 pack-year smoking history. She has never used smokeless tobacco. She reports that she does not drink alcohol or use drugs.    Family History   I have reviewed this patient's family history and updated it with pertinent information if needed.   Family History   Problem Relation Age of Onset     C.A.D. Father 63        cause of death     C.A.D. Mother 78        cause of death       Review of Systems   CONSTITUTIONAL: NEGATIVE for fever, chills, change in weight  EYES: NEGATIVE for vision changes or irritation  ENT/MOUTH: NEGATIVE for ear, mouth and throat problems  RESP: NEGATIVE for significant cough or SOB  CV: NEGATIVE for chest pain, palpitations or peripheral  edema    Physical Exam   Temp: 99  F (37.2  C) Temp src: Temporal BP: 148/68 Pulse: 75   Resp: 22 SpO2: (!) 88 % O2 Device: None (Room air)    Vital Signs with Ranges  Temp:  [99  F (37.2  C)] 99  F (37.2  C)  Pulse:  [75] 75  Resp:  [22] 22  BP: (148)/(68) 148/68  SpO2:  [88 %] 88 %  95 lbs 0 oz    Constitutional: awake, alert, cooperative, no apparent distress, and appears stated age    Eyes: Lids and lashes normal, pupils equal, round and reactive to light, extra ocular muscles intact, sclera clear, conjunctiva normal    ENT: Normocephalic, without obvious abnormality, atraumatic, sinuses nontender on palpation, external ears without lesions, oral pharynx with moist mucous membranes, tonsils without erythema or exudates, gums normal and good dentition.    Respiratory: No increased work of breathing, good air exchange, clear to auscultation bilaterally, no crackles or wheezing    Cardiovascular: Normal apical impulse, regular rate and rhythm, normal S1 and S2, no S3 or S4, and no murmur noted

## 2021-01-07 NOTE — ANESTHESIA CARE TRANSFER NOTE
Patient: Sheila Nuñez    Procedure(s):  Phacoemulsification of cataract with intraocular lens placement, left    Diagnosis: Cataract of left eye, unspecified cataract type [H26.9]  Diagnosis Additional Information: No value filed.    Anesthesia Type:   MAC     Note:  Airway :Nasal Cannula  Patient transferred to:Phase II  Handoff Report: Identifed the Patient, Identified the Reponsible Provider, Reviewed the pertinent medical history, Discussed the surgical course, Reviewed Intra-OP anesthesia mangement and issues during anesthesia, Set expectations for post-procedure period and Allowed opportunity for questions and acknowledgement of understanding      Vitals: (Last set prior to Anesthesia Care Transfer)    CRNA VITALS  1/7/2021 1023 - 1/7/2021 1054      1/7/2021             Resp Rate (observed):  (!) 1    Resp Rate (set):  8                Electronically Signed By: JOSUE Lundberg CRNA  January 7, 2021  10:54 AM

## 2021-01-07 NOTE — OP NOTE
DATE OF SERVICE: 1/7/21       PREOPERATIVE DIAGNOSIS:     Cataract, left eye.       POSTOPERATIVE DIAGNOSIS:  Cataract, left eye.       PROCEDURE:  Phacoemulsification with intraocular lens implant, Model PCB00, 16.0 diopter power.       ANESTHESIA:  Topical with IV sedation.         DESCRIPTION OF PROCEDURE:   Operative risks, benefits and alternatives to the procedure were discussed at length with the patient including loss of vision, loss of the eye.  Patient voiced understanding and informed consent was signed.  The patient was taken to the operating suite, where an appropriate level of anesthesia was given.  Attention was placed to the left eye.  The patient was then prepped and draped in the usual sterile ophthalmic manner.  A lid speculum was placed in the eye to provide exposure and MVR blade was used to make a paracentesis.  Once this was performed, Shugarcaine was then placed intracamerally.  This was then followed by intracameral Viscoat.  A 2.75 mm blade was then used to make a biplanar temporal clear corneal incision.  A cystotome and uttrata were used to make a continuous curvilinear capsulorrhexis.  BSS on Whitney cannula was then used to hydrodissect the lens.  Phacoemulsification was then performed in a divide-and-conquer technique to remove all pieces of the nucleus.  Irrigation aspiration was then used to remove all remaining cortical material and debris.  Amvisc was then used to fill the capsular bag.  A PCB00, 16.0 diopter lens was then injected into the capsular bag using the injector.  Once this was performed, a Goldberg secondary instrument was used to rotate the lens into proper position.  Irrigation aspiration was then used to remove all remaining viscoelastic material and center the lens appropriately.  BSS on 30 gauge cannula was then used to hydrate both wounds.  A Weck-Simona was used to assess intraocular IOP.  Once this was stable and the lens was found to be in good position, the patient  was undraped.  The patient was brought to the recovery area in stable condition.  Family was made aware of the patient's condition and the patient will follow up with us later this afternoon.         COMPLICATIONS:  No complications.         Fracisco Yarbrough MD

## 2021-01-07 NOTE — ANESTHESIA CARE TRANSFER NOTE
Patient: Sheila Nuñez    Procedure(s):  Phacoemulsification of cataract with intraocular lens placement, left    Diagnosis: Cataract of left eye, unspecified cataract type [H26.9]  Diagnosis Additional Information: No value filed.    Anesthesia Type:   MAC     Note:  Airway :Nasal Cannula  Patient transferred to:Phase II  Handoff Report: Identifed the Patient, Identified the Reponsible Provider, Reviewed the pertinent medical history, Discussed the surgical course, Reviewed Intra-OP anesthesia mangement and issues during anesthesia, Set expectations for post-procedure period and Allowed opportunity for questions and acknowledgement of understanding      Vitals: (Last set prior to Anesthesia Care Transfer)    CRNA VITALS  1/7/2021 1023 - 1/7/2021 1058      1/7/2021             Resp Rate (observed):  (!) 1    Resp Rate (set):  8                Electronically Signed By: Monty Rodríguez  January 7, 2021  10:58 AM

## 2021-01-13 ENCOUNTER — TRANSFERRED RECORDS (OUTPATIENT)
Dept: HEALTH INFORMATION MANAGEMENT | Facility: HOSPITAL | Age: 76
End: 2021-01-13

## 2021-01-26 DIAGNOSIS — I10 BENIGN ESSENTIAL HYPERTENSION: ICD-10-CM

## 2021-01-26 DIAGNOSIS — E78.2 MIXED HYPERLIPIDEMIA: ICD-10-CM

## 2021-01-26 RX ORDER — LOVASTATIN 20 MG
TABLET ORAL
Qty: 90 TABLET | Refills: 1 | Status: SHIPPED | OUTPATIENT
Start: 2021-01-26 | End: 2021-08-18

## 2021-01-26 RX ORDER — METOPROLOL TARTRATE 50 MG
TABLET ORAL
Qty: 60 TABLET | Refills: 5 | Status: SHIPPED | OUTPATIENT
Start: 2021-01-26 | End: 2021-10-01

## 2021-01-27 ENCOUNTER — ANESTHESIA EVENT (OUTPATIENT)
Dept: SURGERY | Facility: HOSPITAL | Age: 76
End: 2021-01-27
Payer: COMMERCIAL

## 2021-01-27 ASSESSMENT — COPD QUESTIONNAIRES: COPD: 1

## 2021-01-27 ASSESSMENT — LIFESTYLE VARIABLES: TOBACCO_USE: 1

## 2021-01-27 NOTE — ANESTHESIA PREPROCEDURE EVALUATION
Anesthesia Pre-Procedure Evaluation    Patient: Sheila Nuñez   MRN: [id number] : 1945        Preoperative Diagnosis: Cataract of right eye, unspecified cataract type [H26.9]   Procedure : Procedure(s):  CATARACT EXTRACTION WITH INTRA OCULAR LENS RIGHT EYE     Past Medical History:   Diagnosis Date     Asthma 1/3/2012     CHD (coronary heart disease) 2011     Dyslipidemia 10/06/2011     Tobacco abuse 2011      Past Surgical History:   Procedure Laterality Date     ANGIOGRAM       PHACOEMULSIFICATION WITH STANDARD INTRAOCULAR LENS IMPLANT Left 2021    Procedure: Phacoemulsification of cataract with intraocular lens placement, left;  Surgeon: Fracisco Yarbrough MD;  Location: [facility]     stents        Allergies   Allergen Reactions     Ciprofloxacin Other (See Comments)     Cipro  intolerance     Influenza Vaccines      Influenza virus vacc. Specific       Morphine Other (See Comments)     Becomes very mean     Nitrofurantoin Other (See Comments)     Macrobid  intolerance     Nitrofurantoin Macrocrystal Other (See Comments)     Macrobid  Intolerance        Social History     Tobacco Use     Smoking status: Light Tobacco Smoker     Packs/day: 0.10     Years: 20.00     Pack years: 2.00     Types: Cigarettes     Last attempt to quit: 2015     Years since quittin.4     Smokeless tobacco: Never Used     Tobacco comment: does not want quit plan. working on quitting now.   Substance Use Topics     Alcohol use: No     Alcohol/week: 0.0 standard drinks      Wt Readings from Last 1 Encounters:   21 43.1 kg (95 lb)        Anesthesia Evaluation   Pt has had prior anesthetic. Type: MAC.        ROS/MED HX  ENT/Pulmonary:     (+) MUSA risk factors, hypertension, tobacco use, Current use, asthma severe,  COPD, O2 dependent, 1 liters/min,     Neurologic:  - neg neurologic ROS     Cardiovascular:     (+) Dyslipidemia hypertension--CAD --stent-.  Taking blood thinners Instructions Given to  patient: plavix.     METS/Exercise Tolerance:     Hematologic:  - neg hematologic  ROS     Musculoskeletal:  - neg musculoskeletal ROS     GI/Hepatic:  - neg GI/hepatic ROS     Renal/Genitourinary:  - neg Renal ROS     Endo:  - neg endo ROS     Psychiatric/Substance Use:  - neg psychiatric ROS     Infectious Disease:  - neg infectious disease ROS     Malignancy:  - neg malignancy ROS     Other:  - neg other ROS          Physical Exam    Airway        Mallampati: II   TM distance: > 3 FB   Neck ROM: full   Mouth opening: > 3 cm    Respiratory Devices and Support     Nasal Canula 1  l/min.     Dental       (+) upper dentures and lower dentures      Cardiovascular   cardiovascular exam normal       Rhythm and rate: regular and normal     Pulmonary           (+) decreased breath sounds           OUTSIDE LABS:  CBC:   Lab Results   Component Value Date    WBC 9.5 11/23/2020    WBC 13.2 (H) 01/27/2020    HGB 14.9 11/23/2020    HGB 15.1 01/27/2020    HCT 45.1 11/23/2020    HCT 44.4 01/27/2020     11/23/2020     01/27/2020     BMP:   Lab Results   Component Value Date     11/23/2020     01/27/2020    POTASSIUM 3.4 11/23/2020    POTASSIUM 3.4 01/27/2020    CHLORIDE 100 11/23/2020    CHLORIDE 102 01/27/2020    CO2 31 11/23/2020    CO2 30 01/27/2020    BUN 9 11/23/2020    BUN 16 01/27/2020    CR 0.60 11/23/2020    CR 0.59 01/27/2020    GLC 98 11/23/2020     (H) 01/27/2020     COAGS: No results found for: PTT, INR, FIBR  POC:   Lab Results   Component Value Date     (H) 06/08/2017     HEPATIC:   Lab Results   Component Value Date    ALBUMIN 3.6 01/27/2020    PROTTOTAL 7.5 01/27/2020    ALT 13 01/27/2020    AST 12 01/27/2020    ALKPHOS 125 01/27/2020    BILITOTAL 0.7 01/27/2020     OTHER:   Lab Results   Component Value Date    LACT 1.0 09/02/2015    A1C 5.9 02/26/2014    MARIELA 9.5 11/23/2020    MAG 2.1 09/03/2015    TSH 0.09 (L) 01/27/2020    T4 1.35 01/27/2020    T3 116 10/01/2014        Anesthesia Plan    ASA Status:  3   NPO Status:  NPO Appropriate    Anesthesia Type: MAC     - Reason for MAC: Procedure to face, neck, head or breast   Induction: Other   Maintenance: Other.        Consents    Anesthesia Plan(s) and associated risks, benefits, and realistic alternatives discussed. Questions answered and patient/representative(s) expressed understanding.     - Discussed with:  Patient         Postoperative Care            Comments:    H and P date 1/7/21            Lara Marquez, JOSUE CRNA

## 2021-01-30 ENCOUNTER — OFFICE VISIT (OUTPATIENT)
Dept: FAMILY MEDICINE | Facility: OTHER | Age: 76
End: 2021-01-30
Attending: OPHTHALMOLOGY
Payer: COMMERCIAL

## 2021-01-30 DIAGNOSIS — Z01.818 PRE-OP EXAM: Primary | ICD-10-CM

## 2021-01-30 LAB
SARS-COV-2 RNA RESP QL NAA+PROBE: NORMAL
SPECIMEN SOURCE: NORMAL

## 2021-01-30 PROCEDURE — U0003 INFECTIOUS AGENT DETECTION BY NUCLEIC ACID (DNA OR RNA); SEVERE ACUTE RESPIRATORY SYNDROME CORONAVIRUS 2 (SARS-COV-2) (CORONAVIRUS DISEASE [COVID-19]), AMPLIFIED PROBE TECHNIQUE, MAKING USE OF HIGH THROUGHPUT TECHNOLOGIES AS DESCRIBED BY CMS-2020-01-R: HCPCS | Performed by: OPHTHALMOLOGY

## 2021-01-30 PROCEDURE — U0005 INFEC AGEN DETEC AMPLI PROBE: HCPCS | Performed by: OPHTHALMOLOGY

## 2021-01-31 LAB
LABORATORY COMMENT REPORT: NORMAL
SARS-COV-2 RNA RESP QL NAA+PROBE: NEGATIVE
SPECIMEN SOURCE: NORMAL

## 2021-02-04 ENCOUNTER — HOSPITAL ENCOUNTER (OUTPATIENT)
Facility: HOSPITAL | Age: 76
Discharge: HOME | End: 2021-02-04
Attending: OPHTHALMOLOGY | Admitting: OPHTHALMOLOGY
Payer: COMMERCIAL

## 2021-02-04 ENCOUNTER — ANESTHESIA (OUTPATIENT)
Dept: SURGERY | Facility: HOSPITAL | Age: 76
End: 2021-02-04
Payer: COMMERCIAL

## 2021-02-04 VITALS
OXYGEN SATURATION: 93 % | HEIGHT: 62 IN | RESPIRATION RATE: 16 BRPM | DIASTOLIC BLOOD PRESSURE: 98 MMHG | BODY MASS INDEX: 17.85 KG/M2 | HEART RATE: 71 BPM | SYSTOLIC BLOOD PRESSURE: 133 MMHG | TEMPERATURE: 97.2 F | WEIGHT: 97 LBS

## 2021-02-04 PROCEDURE — 66984 XCAPSL CTRC RMVL W/O ECP: CPT | Performed by: NURSE ANESTHETIST, CERTIFIED REGISTERED

## 2021-02-04 PROCEDURE — V2632 POST CHMBR INTRAOCULAR LENS: HCPCS | Performed by: OPHTHALMOLOGY

## 2021-02-04 PROCEDURE — 999N000141 HC STATISTIC PRE-PROCEDURE NURSING ASSESSMENT: Performed by: OPHTHALMOLOGY

## 2021-02-04 PROCEDURE — 250N000009 HC RX 250: Performed by: OPHTHALMOLOGY

## 2021-02-04 PROCEDURE — 250N000011 HC RX IP 250 OP 636: Performed by: OPHTHALMOLOGY

## 2021-02-04 PROCEDURE — 370N000017 HC ANESTHESIA TECHNICAL FEE, PER MIN: Performed by: OPHTHALMOLOGY

## 2021-02-04 PROCEDURE — 710N000012 HC RECOVERY PHASE 2, PER MINUTE: Performed by: OPHTHALMOLOGY

## 2021-02-04 PROCEDURE — 360N000076 HC SURGERY LEVEL 3, PER MIN: Performed by: OPHTHALMOLOGY

## 2021-02-04 PROCEDURE — 99100 ANES PT EXTEME AGE<1 YR&>70: CPT | Performed by: NURSE ANESTHETIST, CERTIFIED REGISTERED

## 2021-02-04 PROCEDURE — 272N000001 HC OR GENERAL SUPPLY STERILE: Performed by: OPHTHALMOLOGY

## 2021-02-04 DEVICE — IMPLANTABLE DEVICE: Type: IMPLANTABLE DEVICE | Site: EYE | Status: FUNCTIONAL

## 2021-02-04 RX ORDER — TETRACAINE HYDROCHLORIDE 5 MG/ML
SOLUTION OPHTHALMIC PRN
Status: DISCONTINUED | OUTPATIENT
Start: 2021-02-04 | End: 2021-02-04 | Stop reason: HOSPADM

## 2021-02-04 RX ORDER — OFLOXACIN 3 MG/ML
SOLUTION/ DROPS OPHTHALMIC PRN
Status: DISCONTINUED | OUTPATIENT
Start: 2021-02-04 | End: 2021-02-04 | Stop reason: HOSPADM

## 2021-02-04 RX ORDER — ONDANSETRON 4 MG/1
4 TABLET, ORALLY DISINTEGRATING ORAL EVERY 30 MIN PRN
Status: DISCONTINUED | OUTPATIENT
Start: 2021-02-04 | End: 2021-02-04 | Stop reason: HOSPADM

## 2021-02-04 RX ORDER — NALOXONE HYDROCHLORIDE 0.4 MG/ML
0.4 INJECTION, SOLUTION INTRAMUSCULAR; INTRAVENOUS; SUBCUTANEOUS
Status: DISCONTINUED | OUTPATIENT
Start: 2021-02-04 | End: 2021-02-04 | Stop reason: HOSPADM

## 2021-02-04 RX ORDER — PHENYLEPHRINE HYDROCHLORIDE 100 MG/ML
1 SOLUTION/ DROPS OPHTHALMIC ONCE
Status: COMPLETED | OUTPATIENT
Start: 2021-02-04 | End: 2021-02-04

## 2021-02-04 RX ORDER — ONDANSETRON 2 MG/ML
4 INJECTION INTRAMUSCULAR; INTRAVENOUS EVERY 30 MIN PRN
Status: DISCONTINUED | OUTPATIENT
Start: 2021-02-04 | End: 2021-02-04 | Stop reason: HOSPADM

## 2021-02-04 RX ORDER — NALOXONE HYDROCHLORIDE 0.4 MG/ML
0.2 INJECTION, SOLUTION INTRAMUSCULAR; INTRAVENOUS; SUBCUTANEOUS
Status: DISCONTINUED | OUTPATIENT
Start: 2021-02-04 | End: 2021-02-04 | Stop reason: HOSPADM

## 2021-02-04 RX ORDER — PREDNISOLONE ACETATE 10 MG/ML
SUSPENSION/ DROPS OPHTHALMIC PRN
Status: DISCONTINUED | OUTPATIENT
Start: 2021-02-04 | End: 2021-02-04 | Stop reason: HOSPADM

## 2021-02-04 RX ORDER — LIDOCAINE 40 MG/G
CREAM TOPICAL
Status: DISCONTINUED | OUTPATIENT
Start: 2021-02-04 | End: 2021-02-04 | Stop reason: HOSPADM

## 2021-02-04 RX ORDER — TOBRAMYCIN 3 MG/ML
1 SOLUTION/ DROPS OPHTHALMIC ONCE
Status: DISCONTINUED | OUTPATIENT
Start: 2021-02-04 | End: 2021-02-04 | Stop reason: HOSPADM

## 2021-02-04 RX ORDER — PHENYLEPHRINE HYDROCHLORIDE 25 MG/ML
1 SOLUTION/ DROPS OPHTHALMIC
Status: COMPLETED | OUTPATIENT
Start: 2021-02-04 | End: 2021-02-04

## 2021-02-04 RX ORDER — PROPARACAINE HYDROCHLORIDE 5 MG/ML
1 SOLUTION/ DROPS OPHTHALMIC ONCE
Status: COMPLETED | OUTPATIENT
Start: 2021-02-04 | End: 2021-02-04

## 2021-02-04 RX ORDER — MEPERIDINE HYDROCHLORIDE 25 MG/ML
12.5 INJECTION INTRAMUSCULAR; INTRAVENOUS; SUBCUTANEOUS
Status: DISCONTINUED | OUTPATIENT
Start: 2021-02-04 | End: 2021-02-04 | Stop reason: HOSPADM

## 2021-02-04 RX ORDER — SODIUM CHLORIDE, SODIUM LACTATE, POTASSIUM CHLORIDE, CALCIUM CHLORIDE 600; 310; 30; 20 MG/100ML; MG/100ML; MG/100ML; MG/100ML
INJECTION, SOLUTION INTRAVENOUS CONTINUOUS
Status: DISCONTINUED | OUTPATIENT
Start: 2021-02-04 | End: 2021-02-04 | Stop reason: HOSPADM

## 2021-02-04 RX ORDER — OFLOXACIN 3 MG/ML
1 SOLUTION/ DROPS OPHTHALMIC ONCE
Status: DISCONTINUED | OUTPATIENT
Start: 2021-02-04 | End: 2021-02-04

## 2021-02-04 RX ORDER — CYCLOPENTOLATE HYDROCHLORIDE 10 MG/ML
1 SOLUTION/ DROPS OPHTHALMIC
Status: COMPLETED | OUTPATIENT
Start: 2021-02-04 | End: 2021-02-04

## 2021-02-04 RX ADMIN — PHENYLEPHRINE HYDROCHLORIDE 1 DROP: 25 SOLUTION/ DROPS OPHTHALMIC at 08:34

## 2021-02-04 RX ADMIN — CYCLOPENTOLATE HYDROCHLORIDE 1 DROP: 10 SOLUTION OPHTHALMIC at 08:33

## 2021-02-04 RX ADMIN — CYCLOPENTOLATE HYDROCHLORIDE 1 DROP: 10 SOLUTION OPHTHALMIC at 08:42

## 2021-02-04 RX ADMIN — PROPARACAINE HYDROCHLORIDE 1 DROP: 5 SOLUTION/ DROPS OPHTHALMIC at 08:29

## 2021-02-04 RX ADMIN — PHENYLEPHRINE HYDROCHLORIDE 1 DROP: 100 SOLUTION/ DROPS OPHTHALMIC at 09:01

## 2021-02-04 RX ADMIN — TOBRAMYCIN 0.5 ML: 3 SOLUTION/ DROPS OPHTHALMIC at 08:44

## 2021-02-04 RX ADMIN — PHENYLEPHRINE HYDROCHLORIDE 1 DROP: 25 SOLUTION/ DROPS OPHTHALMIC at 08:43

## 2021-02-04 ASSESSMENT — COPD QUESTIONNAIRES: CAT_SEVERITY: SEVERE

## 2021-02-04 ASSESSMENT — MIFFLIN-ST. JEOR: SCORE: 888.24

## 2021-02-04 NOTE — OP NOTE
DATE OF SERVICE: 2/4/21       PREOPERATIVE DIAGNOSIS:     Cataract, Right eye.       POSTOPERATIVE DIAGNOSIS:  Cataract, Right eye.       PROCEDURE:  Phacoemulsification with intraocular lens implant, Model PCB00, 15.5 diopter power.       ANESTHESIA:  Topical with IV sedation.         DESCRIPTION OF PROCEDURE:   Operative risks, benefits and alternatives to the procedure were discussed at length with the patient including loss of vision, loss of the eye.  Patient voiced understanding and informed consent was signed.  The patient was taken to the operating suite, where an appropriate level of anesthesia was given.  Attention was placed to the right eye.  The patient was then prepped and draped in the usual sterile ophthalmic manner.  A lid speculum was placed in the eye to provide exposure and MVR blade was used to make a paracentesis.  Once this was performed, Shugarcaine was then placed intracamerally.  This was then followed by intracameral Viscoat.  A 2.75 mm blade was then used to make a biplanar temporal clear corneal incision.  A cystotome and uttrata were used to make a continuous curvilinear capsulorrhexis.  BSS on Whitney cannula was then used to hydrodissect the lens.  Phacoemulsification was then performed in a divide-and-conquer technique to remove all pieces of the nucleus.  Irrigation aspiration was then used to remove all remaining cortical material and debris.  Amvisc was then used to fill the capsular bag.  A PCB00 15.5 diopter lens was then injected into the capsular bag using the injector.  Once this was performed, a Goldberg secondary instrument was used to rotate the lens into proper position.  Irrigation aspiration was then used to remove all remaining viscoelastic material and center the lens appropriately.  BSS on 30 gauge cannula was then used to hydrate both wounds.  A Weck-Simona was used to assess intraocular IOP.  Once this was stable and the lens was found to be in good position, the  patient was undraped.  The patient was brought to the recovery area in stable condition.  Family was made aware of the patient's condition and the patient will follow up with us later this afternoon.  No complications.           Fracisco Yarbrough MD

## 2021-02-04 NOTE — DISCHARGE INSTRUCTIONS
AFTER CATARACT SURGERY RESTRICTIONS    SHIELD: WEAR OVER SURGICAL EYE AT NIGHT FOR 1 WEEK    ACTIVITY/LIFTING: Avoid activities, which increase abdominal/head pressure such as pulling on a starter cord, heavy lifting (over 15-20 lbs) or bending with the head below the waist, for 1 week. Avoid sudden jerky movements (chopping, shoveling) for 1 week. Waking and lower body exercise such as biking is okay.     WATER: Showering/washing hair is okay the day after surgery, but avoid excess water, soap, or shampoo in your eyes. Clean eyelids gently with a clean, wet washcloth as needed. Avoid pressure on the eye.     SUNLIGHT: If the eye is light sensitive after surgery, dark glasses may be worn.     DIET/MEDICATIONS: Resume your usual diet and medications your family doctor has prescribed. Continue to use/follow the instructions for your eye drops.     DRIVING: Avoid driving with a patch. Resume driving when you feel safe, but don't drive on the day of surgery.    PAIN: Minor pain and itching is normal during healing. DO NOT RUB THE EYE! Report any unusual swelling, increased discharge or pain that is not relieved by Tylenol (or equivalent). If sudden drop/change in vision call immediately. [facility] [phone]    CONTINUE TO USE ALL THE EYE DROPS PER THE INSTRUCTIONS ORDERED BY DR. LLANOS'S TECHNICIANS    FOR EMERGENCY DR. VOGT: [phone]    FOLLOW EYE DROP INSTRUCTIONS GIVEN BY 's OFFICE

## 2021-02-04 NOTE — OR NURSING
Patient and responsible adult given discharge instructions with no questions regarding instructions. Deuce score 20. Pain level 0/10.  Discharged from unit via ambulation. Patient discharged to home accompanied by daughter Almaz.

## 2021-02-04 NOTE — ANESTHESIA POSTPROCEDURE EVALUATION
Patient: Sheila Nuñez    Procedure(s):  CATARACT EXTRACTION WITH INTRA OCULAR LENS RIGHT EYE    Diagnosis:Cataract of right eye, unspecified cataract type [H26.9]  Diagnosis Additional Information: No value filed.    Anesthesia Type:  MAC    Note:  Disposition: Outpatient   Postop Pain Control: Uneventful            Sign Out: Well controlled pain   PONV: No   Neuro/Psych: Uneventful            Sign Out: Acceptable/Baseline neuro status   Airway/Respiratory: Uneventful            Sign Out: Acceptable/Baseline resp. status   CV/Hemodynamics: Uneventful            Sign Out: Acceptable CV status   Other NRE: NONE   DID A NON-ROUTINE EVENT OCCUR? No         Last vitals:  CRNA VITALS  2/4/2021 0902 - 2/4/2021 0942      2/4/2021             Resp Rate (observed):  16    Resp Rate (set):  8    EKG:  Sinus rhythm          Electronically Signed By: JOSUE Marmolejo CRNA  February 4, 2021  9:42 AM

## 2021-02-04 NOTE — ANESTHESIA CARE TRANSFER NOTE
Patient: Sheila Nuñez    Procedure(s):  CATARACT EXTRACTION WITH INTRA OCULAR LENS RIGHT EYE    Diagnosis: Cataract of right eye, unspecified cataract type [H26.9]  Diagnosis Additional Information: No value filed.    Anesthesia Type:   MAC     Note:    Oropharynx: spontaneously breathing  Level of Consciousness: awake  Oxygen Supplementation: nasal cannula  Level of Supplemental Oxygen (L/min / FiO2): 98  Independent Airway: airway patency satisfactory and stable  Dentition: dentition unchanged  Vital Signs Stable: post-procedure vital signs reviewed and stable  Report to RN Given: handoff report given  Patient transferred to: Phase II    Handoff Report: Identifed the Patient, Identified the Reponsible Provider, Reviewed the pertinent medical history, Discussed the surgical course, Reviewed Intra-OP anesthesia mangement and issues during anesthesia, Set expectations for post-procedure period and Allowed opportunity for questions and acknowledgement of understanding      Vitals: (Last set prior to Anesthesia Care Transfer)  CRNA VITALS  2/4/2021 0902 - 2/4/2021 0934      2/4/2021             Resp Rate (observed):  16    Resp Rate (set):  8    EKG:  Sinus rhythm        Electronically Signed By: JOSUE Lundberg CRNA  February 4, 2021  9:34 AM

## 2021-03-30 DIAGNOSIS — I25.810 CORONARY ARTERY DISEASE INVOLVING CORONARY BYPASS GRAFT OF NATIVE HEART WITHOUT ANGINA PECTORIS: ICD-10-CM

## 2021-03-30 RX ORDER — CLOPIDOGREL BISULFATE 75 MG/1
TABLET ORAL
Qty: 90 TABLET | Refills: 0 | Status: SHIPPED | OUTPATIENT
Start: 2021-03-30 | End: 2021-06-29

## 2021-03-30 NOTE — TELEPHONE ENCOUNTER
plavix 75 mg      Last Written Prescription Date:  12-  Last Fill Quantity: 90,   # refills: 0  Last Office Visit: -2020

## 2021-06-28 DIAGNOSIS — I25.810 CORONARY ARTERY DISEASE INVOLVING CORONARY BYPASS GRAFT OF NATIVE HEART WITHOUT ANGINA PECTORIS: ICD-10-CM

## 2021-06-28 NOTE — TELEPHONE ENCOUNTER
plavix 75 mg      Last Written Prescription Date:  3-  Last Fill Quantity: 90,   # refills: 0  Last Office Visit: 11-

## 2021-06-29 RX ORDER — CLOPIDOGREL BISULFATE 75 MG/1
TABLET ORAL
Qty: 90 TABLET | Refills: 0 | Status: SHIPPED | OUTPATIENT
Start: 2021-06-29 | End: 2021-09-28

## 2021-07-05 DIAGNOSIS — I10 ESSENTIAL HYPERTENSION: ICD-10-CM

## 2021-07-06 RX ORDER — AMLODIPINE BESYLATE 10 MG/1
TABLET ORAL
Qty: 90 TABLET | Refills: 0 | Status: SHIPPED | OUTPATIENT
Start: 2021-07-06 | End: 2021-10-01

## 2021-07-06 NOTE — TELEPHONE ENCOUNTER
NROVASC      Last Written Prescription Date:  11/23/2020  Last Fill Quantity: 90,   # refills: 1  Last Office Visit: 1/30/2021  Future Office visit:    Next 5 appointments (look out 90 days)    Jul 23, 2021 11:00 AM  (Arrive by 10:45 AM)  Office Visit with David Littlejohn MD  [facility] [place] ([facility] [place] ) [address]  [place] [address]  [phone]           Routing refill request to provider for review/approval because:

## 2021-07-19 ENCOUNTER — TELEPHONE (OUTPATIENT)
Dept: FAMILY MEDICINE | Facility: OTHER | Age: 76
End: 2021-07-19

## 2021-07-19 NOTE — TELEPHONE ENCOUNTER
LVM for patient to call back and either reschedule when Dr. Littlejohn is available again or with another provider for a lump under ear, appt originally scheduled on 07/23.

## 2021-08-18 DIAGNOSIS — E78.2 MIXED HYPERLIPIDEMIA: ICD-10-CM

## 2021-08-18 RX ORDER — LOVASTATIN 20 MG
TABLET ORAL
Qty: 90 TABLET | Refills: 0 | Status: SHIPPED | OUTPATIENT
Start: 2021-08-18 | End: 2021-11-24

## 2021-09-27 DIAGNOSIS — I25.810 CORONARY ARTERY DISEASE INVOLVING CORONARY BYPASS GRAFT OF NATIVE HEART WITHOUT ANGINA PECTORIS: ICD-10-CM

## 2021-09-28 RX ORDER — CLOPIDOGREL BISULFATE 75 MG/1
TABLET ORAL
Qty: 90 TABLET | Refills: 0 | Status: SHIPPED | OUTPATIENT
Start: 2021-09-28 | End: 2021-12-22

## 2021-09-28 NOTE — TELEPHONE ENCOUNTER
Plavix      Last Written Prescription Date:  06/29/21  Last Fill Quantity: 90,   # refills: 0  Last Office Visit: 11/23/20  Future Office visit:    Next 5 appointments (look out 90 days)    Sep 29, 2021 10:00 AM  (Arrive by 9:45 AM)  SHORT with David Littlejohn MD  [facility] [place] ([facility] [place] ) [address]  [place] [address]  [phone]

## 2021-09-29 ENCOUNTER — OFFICE VISIT (OUTPATIENT)
Dept: FAMILY MEDICINE | Facility: OTHER | Age: 76
End: 2021-09-29
Attending: FAMILY MEDICINE
Payer: COMMERCIAL

## 2021-09-29 VITALS
HEART RATE: 63 BPM | DIASTOLIC BLOOD PRESSURE: 64 MMHG | WEIGHT: 92 LBS | OXYGEN SATURATION: 87 % | BODY MASS INDEX: 16.93 KG/M2 | SYSTOLIC BLOOD PRESSURE: 118 MMHG | HEIGHT: 62 IN | TEMPERATURE: 96.9 F

## 2021-09-29 DIAGNOSIS — R22.1 MASS OF LEFT SIDE OF NECK: Primary | ICD-10-CM

## 2021-09-29 PROCEDURE — 99213 OFFICE O/P EST LOW 20 MIN: CPT | Performed by: FAMILY MEDICINE

## 2021-09-29 ASSESSMENT — PAIN SCALES - GENERAL: PAINLEVEL: NO PAIN (0)

## 2021-09-29 ASSESSMENT — MIFFLIN-ST. JEOR: SCORE: 860.56

## 2021-09-29 NOTE — PROGRESS NOTES
"    Assessment & Plan     Mass of left side of neck  Due to the symptomatic and enlarging nature appointment with ENT scheduled for evaluation and recommendations for further management.    - Otolaryngology Referral       Tobacco Cessation:   reports that she has been smoking cigarettes. She has a 2.00 pack-year smoking history. She has never used smokeless tobacco.  Tobacco Cessation Action Plan: Self help information given to patient    See Patient Instructions    No follow-ups on file.    David Littlejohn MD  [facility] - PARMJIT Sosa is a 76 year old who presents for the following health issues     HPI     Concern - lump by left ear  Onset: >1 yr  Description: cyst like lump   Intensity: mild  Progression of Symptoms:  same  Accompanying Signs & Symptoms: none  Previous history of similar problem: none  Precipitating factors:        Worsened by: none  Alleviating factors:        Improved by: none  Therapies tried and outcome: None    Sheila has had a cystic mass in the left inferior auricular area previously felt to be parotid in nature.  She underwent biopsy which was negative for tumor.  This has recurred and has become symptomatic.  No associated symptoms    Review of Systems   Constitutional, HEENT, cardiovascular, pulmonary, gi and gu systems are negative, except as otherwise noted.      Objective    /64   Pulse 63   Temp 96.9  F (36.1  C) (Tympanic)   Ht 1.575 m (5' 2\")   Wt 41.7 kg (92 lb)   SpO2 (!) 87%   BMI 16.83 kg/m    Body mass index is 16.83 kg/m .  Physical Exam  Vitals and nursing note reviewed.   Constitutional:       General: She is not in acute distress.     Appearance: She is well-developed.   HENT:      Ears:      Comments: There is a moveable nontendeer cystic lesion noted inferior left auricular region.  This does not involve the canal.  No palpable adenopathy  Neurological:      Mental Status: She is alert and oriented to person, place, and " time.   Psychiatric:         Mood and Affect: Mood normal.         Behavior: Behavior normal.         Thought Content: Thought content normal.        Other exam not repeated    Office Visit on 01/30/2021   Component Date Value Ref Range Status     COVID-19 Virus PCR to [facility] - So* 01/30/2021 Nasopharyngeal   Final     COVID-19 Virus PCR to [facility] - Re* 01/30/2021 Test received-See reflex to [facility] test SARS CoV2 (COVID-19) Virus RT-PCR   Final     SARS-CoV-2 Virus Specimen Source 01/30/2021 Nasopharyngeal   Final     SARS-CoV-2 PCR Result 01/30/2021 NEGATIVE   Final    SARS-CoV2 (COVID-19) RNA not detected, presumed negative.     SARS-CoV-2 PCR Comment 01/30/2021    Final                    Value:Testing was performed using the [employer] COVID-19 Direct Assay on the [email or web address] Liaison MDX   instrument. Additional information about this Emergency Use Authorization (EUA) assay can   be found via the Lab Guide.      Comment: This test should be ordered for the detection of SARS-CoV-2 in individuals who   meet SARS-CoV-2 clinical and/or epidemiological criteria. Test performance is   unknown in asymptomatic patients.  This test is for in vitro diagnostic use under the FDA EUA for laboratories   certified under CLIA to perform high complexity testing. This test has not   been FDA cleared or approved.  A negative result does not rule out the presence of PCR inhibitors in the   specimen or target RNA in concentration below the limit of detection for the   assay. The possibility of a false negative should be considered if the   patient's recent exposure or clinical presentation suggests COVID-19.   This test was validated by the [facility]   Diagnostic Laboratory. This laboratory is certified under the Clinical   Laboratory Improvement Amendments of 1988 (CLIA-88) as qualified to perform   high complexity laboratory testing.

## 2021-09-29 NOTE — NURSING NOTE
"Chief Complaint   Patient presents with     Mass       Initial /64   Pulse 63   Temp 96.9  F (36.1  C) (Tympanic)   Ht 1.575 m (5' 2\")   Wt 41.7 kg (92 lb)   SpO2 (!) 87%   BMI 16.83 kg/m   Estimated body mass index is 16.83 kg/m  as calculated from the following:    Height as of this encounter: 1.575 m (5' 2\").    Weight as of this encounter: 41.7 kg (92 lb).  Medication Reconciliation: complete  Alexa Farias LPN  "

## 2021-09-30 DIAGNOSIS — I10 BENIGN ESSENTIAL HYPERTENSION: ICD-10-CM

## 2021-09-30 DIAGNOSIS — I10 ESSENTIAL HYPERTENSION: ICD-10-CM

## 2021-10-01 RX ORDER — AMLODIPINE BESYLATE 10 MG/1
TABLET ORAL
Qty: 90 TABLET | Refills: 0 | Status: SHIPPED | OUTPATIENT
Start: 2021-10-01 | End: 2021-12-22

## 2021-10-01 RX ORDER — METOPROLOL TARTRATE 50 MG
TABLET ORAL
Qty: 60 TABLET | Refills: 0 | Status: SHIPPED | OUTPATIENT
Start: 2021-10-01 | End: 2021-10-28

## 2021-10-01 NOTE — TELEPHONE ENCOUNTER
norvasc      Last Written Prescription Date:  7/6/21  Last Fill Quantity: 90,   # refills: 0  Last Office Visit: 9/29/21  Future Office visit:         metoprolol      Last Written Prescription Date:  1/26/21  Last Fill Quantity: 60,   # refills: 5  Last Office Visit: 9/29/21  Future Office visit:

## 2021-10-12 ENCOUNTER — OFFICE VISIT (OUTPATIENT)
Dept: OTOLARYNGOLOGY | Facility: OTHER | Age: 76
End: 2021-10-12
Attending: FAMILY MEDICINE
Payer: COMMERCIAL

## 2021-10-12 VITALS
WEIGHT: 92 LBS | HEIGHT: 62 IN | SYSTOLIC BLOOD PRESSURE: 122 MMHG | OXYGEN SATURATION: 89 % | HEART RATE: 78 BPM | BODY MASS INDEX: 16.93 KG/M2 | TEMPERATURE: 97 F | DIASTOLIC BLOOD PRESSURE: 66 MMHG

## 2021-10-12 DIAGNOSIS — H65.492 COME (CHRONIC OTITIS MEDIA WITH EFFUSION), LEFT: ICD-10-CM

## 2021-10-12 DIAGNOSIS — Z71.6 TOBACCO ABUSE COUNSELING: ICD-10-CM

## 2021-10-12 DIAGNOSIS — H90.A32 MIXED CONDUCTIVE AND SENSORINEURAL HEARING LOSS OF LEFT EAR WITH RESTRICTED HEARING OF RIGHT EAR: ICD-10-CM

## 2021-10-12 DIAGNOSIS — K11.8 MASS OF LEFT PAROTID GLAND: Primary | ICD-10-CM

## 2021-10-12 PROCEDURE — 92511 NASOPHARYNGOSCOPY: CPT | Performed by: PHYSICIAN ASSISTANT

## 2021-10-12 PROCEDURE — 92504 EAR MICROSCOPY EXAMINATION: CPT | Performed by: PHYSICIAN ASSISTANT

## 2021-10-12 PROCEDURE — 99214 OFFICE O/P EST MOD 30 MIN: CPT | Mod: 25 | Performed by: PHYSICIAN ASSISTANT

## 2021-10-12 ASSESSMENT — PAIN SCALES - GENERAL: PAINLEVEL: NO PAIN (0)

## 2021-10-12 ASSESSMENT — MIFFLIN-ST. JEOR: SCORE: 860.56

## 2021-10-12 NOTE — NURSING NOTE
"Chief Complaint   Patient presents with     Consult     Pt has been referred by Dr. Littlejohn for mass of left side of neck.       Initial /66 (Cuff Size: Adult Regular)   Pulse 78   Temp 97  F (36.1  C) (Tympanic)   Ht 1.575 m (5' 2\")   Wt 41.7 kg (92 lb)   SpO2 (!) 89%   BMI 16.83 kg/m   Estimated body mass index is 16.83 kg/m  as calculated from the following:    Height as of this encounter: 1.575 m (5' 2\").    Weight as of this encounter: 41.7 kg (92 lb).  Medication Reconciliation: complete  Ayesha Maradiaga LPN    "

## 2021-10-12 NOTE — PATIENT INSTRUCTIONS
Follow up with audiogram and Dr. Benton for left tube placement.     Referral to [facility], Dr. Hinds  Left parotid mass.     Thank you for allowing Adrienne Higgins PA-C and our ENT team to participate in your care.  If your medications are too expensive, please give the nurse a call.  We can possibly change this medication.  If you have a scheduling or an appointment question please contact our Health Unit Coordinator at [phone], Ext. [phone].    ALL nursing questions or concerns can be directed to your ENT nurse at: [phone] [place]

## 2021-10-12 NOTE — PROGRESS NOTES
Otolaryngology Consultation    Patient: Sheila Nuñez  : 1945    Patient presents with:  Consult: Pt has been referred by Dr. Littlejohn for mass of left side of neck.      HPI:  Sheila Nuñez is a 76 year old female seen today for left neck mass.   She was last seen in ENT on 20 by Dr. Benton. She had  Left tube placed for COM. She complete CT, and FNA which was benign parotid mass. Patient was referred to ENT in [place] for further options.  Patient did not complete referral for parotid mass.   She presents today for concerns of left ear issues. She had a left tube placed at her last visit. She reports her decreased left hearing.   No otalgia, otorrhea.   No vertigo.   No facial paraesthesia.     She has noticed left preauricular mass for over 3 years.  However over the last month or so it seems to have become more firm.  She denies any fluctuation in size of the mass with oral intake or any generalized parotid swelling.  However, she is able to apply warm compresses and feels the mass does decrease in size.   She denies facial weakness  She denies any associated discomfort or distasteful oral drainage.     She does have a history of tobacco abuse and has limited use.         PROCEDURE: CT SOFT TISSUE NECK W CONTRAST 2020 8:52 AM     HISTORY: Neck mass, solitary, afebrile; Mass of left side of neck     COMPARISONS: None.     Meds/Dose Given: Isovue 300, 100ml     TECHNIQUE: CT scan of the neck with IV contrast sagittal coronal  reconstructions were obtained     FINDINGS: The muscles of mastication in the parapharyngeal spaces are  normal. There is a low-density mass in the superficial lobe of the  left parotid measuring 1.6 cm diameter. There are either separate  nodules were extension of the primary nodule into the junction of the  deep and superficial lobes of the level of the posterior aspect of the  mandible. The right parotid and both submandibular glands appear  normal. The larynx  and upper trachea is normal. The thyroid gland is  enlarged with multiple small nodules consistent with multinodular  goiter. The cervical and upper mediastinal lymph nodes are normal in  caliber. Emphysematous changes are noted in both lungs which are  severe.                                                                        IMPRESSION: Left parotid mass.     IMTIAZ RAMSEY MD      FNA completed on 4/20/20   FNA-left parotid mass.:   Negative for malignancy   Specimen Adequacy: Satisfactory for evaluation.     Electronically signed out by:     Daren Boo M.D.   Current Outpatient Rx   Medication Sig Dispense Refill     amLODIPine (NORVASC) 10 MG tablet Take 1 tablet by mouth once daily 90 tablet 0     metoprolol tartrate (LOPRESSOR) 50 MG tablet Take 1 tablet by mouth twice daily 60 tablet 0     albuterol (2.5 MG/3ML) 0.083% neb solution Take 1 vial (2.5 mg) by nebulization every 4 hours as needed for shortness of breath / dyspnea or wheezing 1 Box 11     clopidogrel (PLAVIX) 75 MG tablet Take 1 tablet by mouth once daily 90 tablet 0     FLAXSEED 2,000 mg 2 times daily        furosemide (LASIX) 20 MG tablet Take 1 tablet (20 mg) by mouth daily 30 tablet 0     Ipratropium-Albuterol (COMBIVENT RESPIMAT)  MCG/ACT inhaler Inhale 1 puff into the lungs 4 times daily Not to exceed 6 doses per day. 3 Inhaler 3     ketorolac (ACULAR) 0.5 % ophthalmic solution INSTILL 1 DROP INTO LEFT EYE 4 TIMES DAILY FOR ONE MONTH PATIENT TO START DROPS 2 DAYS PRIOR TO PROCEDURE       lovastatin (MEVACOR) 20 MG tablet TAKE 1 TABLET BY MOUTH ONCE DAILY WITH EVENING MEAL 90 tablet 0     ofloxacin (OCUFLOX) 0.3 % ophthalmic solution INSTILL 1 DROP INTO LEFT EYE 4 TIMES DAILY FOR 9 DAYS START DROPS TWO DAYS PRIOR TO PROCEDURE       prednisoLONE acetate (PRED FORTE) 1 % ophthalmic suspension INSTILL 1 DROP INTO LEFT EYE 4 TIMES DAILY FOR 1 MONTH PATIENT TO START DROPS AFTER SURGERY         Allergies: Ciprofloxacin, Influenza  "vaccines, Morphine, Nitrofurantoin, and Nitrofurantoin macrocrystal     Past Medical History:   Diagnosis Date     Asthma 1/3/2012     CHD (coronary heart disease) 2011     Dyslipidemia 10/06/2011     Tobacco abuse 2011       Past Surgical History:   Procedure Laterality Date     ANGIOGRAM       PHACOEMULSIFICATION WITH STANDARD INTRAOCULAR LENS IMPLANT Left 2021    Procedure: Phacoemulsification of cataract with intraocular lens placement, left;  Surgeon: Fracisco Yarbrough MD;  Location: [facility]     PHACOEMULSIFICATION WITH STANDARD INTRAOCULAR LENS IMPLANT Right 2021    Procedure: CATARACT EXTRACTION WITH INTRA OCULAR LENS RIGHT EYE;  Surgeon: Fracisco Yarbrough MD;  Location: [facility] OR     stents         ENT family history reviewed    Social History     Tobacco Use     Smoking status: Light Tobacco Smoker     Packs/day: 0.10     Years: 20.00     Pack years: 2.00     Types: Cigarettes     Last attempt to quit: 2015     Years since quittin.1     Smokeless tobacco: Never Used     Tobacco comment: does not want quit plan. working on quitting now.   Substance Use Topics     Alcohol use: No     Alcohol/week: 0.0 standard drinks     Drug use: No       Review of Systems  ROS: 10 point ROS neg other than the symptoms noted above in the HPI     Physical Exam  /66 (Cuff Size: Adult Regular)   Pulse 78   Temp 97  F (36.1  C) (Tympanic)   Ht 1.575 m (5' 2\")   Wt 41.7 kg (92 lb)   SpO2 (!) 89%   BMI 16.83 kg/m        General - The patient is well nourished and well developed, and appears thin.  Alert and oriented to person and place, answers questions and cooperates with examination appropriately.   Head and Face - Normocephalic and atraumatic, with no gross asymmetry noted.  The facial nerve is intact, with strong symmetric movements. Grade 1/6 bilaterally  Voice and Breathing - The patient was breathing comfortably without the use of accessory muscles. There was no wheezing, stridor, or " stertor.  The patients voice was clear and strong, and had appropriate pitch and quality. Intermittent throat clearing and coughing during visit.   No gabriella peripheral digital clubbing or cyanosis   Ears -examined under microscopy bilaterally  Cerumen removed bilaterally with a loop and alligator.  the external auditory canals are patent, the right tympanic membrane is intact without effusion, retraction or mass.  Bony landmarks are intact.  Left TM with serous effusion and retraction, no evidence of cholesteatoma. Her TM with severe retraction along ME.   Eyes - Extraocular movements intact, and the pupils were reactive to light.  Sclera were not icteric or injected, conjunctiva were pink and moist.  Mouth - Examination of the oral cavity showed pink, healthy oral mucosa. No lesions or ulcerations noted.  The tongue was mobile and midline, and edentulous with upper and lower dentures.    Throat - The walls of the oropharynx were smooth, pink, moist, symmetric, and had no lesions or ulcerations.  The tonsillar pillars and soft palate were symmetric.  The uvula was midline on elevation.    Neck - Left firm 1.6-1.8 cm preauricular parotid mass with extension  No right parotid mass  No palpable enlarged fixed cervical lymph nodes.  No neck cysts or unusual tenderness to palpation.   No palpable fixed thyroid nodules or concerning goiter.  The trachea is grossly midline.   Nose - External contour is symmetric, no gross deflection or scars.  Nasal mucosa is pink and moist with no abnormal mucus.  The septum and turbinates were evaluated: nonobstructive.  No polyps, masses, or purulence noted on examination.     Nasopharyngoscopy    After informed consent was obtained and the nose was anesthetized with pledgets soaked with neosynepherine/lidocaine, the scope was advanced into the nares.  There is no purulence and/ or excessive swelling.  Eustachian tubes are patent, no nasopharyngeal mass.  The scope was removed she  tolerated this well       ASSESSMENT:    ICD-10-CM    1. Mass of left parotid gland  K11.8    2. COME (chronic otitis media with effusion), left  H65.492    3. Mixed conductive and sensorineural hearing loss of left ear with restricted hearing of right ear  H90.A32    4. Tobacco abuse counseling  Z71.6          Discussed options with Sheila, she will return for audiogram and tube placement with Dr. Benton. I did not complete myringotomy due to severe retraction seen on examination today.   I will defer for Dr. Benton to place tube.     Patient was referred prior to [place] [facility] regarding parotid surgery, Dr. Hinds. Referral placed. I discussed repeat imaging, she declined at this time.   Parotidectomy and facial nerve anatomy briefly discussed         Quit tobacco. However, she has significantly decreased use.         Adrienne Higgins PA-C  ENT  [facility], [place]

## 2021-10-12 NOTE — Clinical Note
Patient will see you for tube. NP clear. I did not think I could place tube due to the severe retraction. Thanks. T Sent message via portal.

## 2021-10-12 NOTE — LETTER
10/12/2021         RE: Sheila Nuñez  [address]  [place] [address]        Dear Colleague,    Thank you for referring your patient, Sheila Nuñez, to the [facility]. Please see a copy of my visit note below.    Otolaryngology Consultation    Patient: Sheila Nuñez  : 1945    Patient presents with:  Consult: Pt has been referred by Dr. Littlejohn for mass of left side of neck.      HPI:  Sheila Nuñez is a 76 year old female seen today for left neck mass.   She was last seen in ENT on 20 by Dr. Benton. She had  Left tube placed for COM. She complete CT, and FNA which was benign parotid mass. Patient was referred to ENT in [place] for further options.  Patient did not complete referral for parotid mass.   She presents today for concerns of left ear issues. She had a left tube placed at her last visit. She reports her decreased left hearing.   No otalgia, otorrhea.   No vertigo.   No facial paraesthesia.     She has noticed left preauricular mass for over 3 years.  However over the last month or so it seems to have become more firm.  She denies any fluctuation in size of the mass with oral intake or any generalized parotid swelling.  However, she is able to apply warm compresses and feels the mass does decrease in size.   She denies facial weakness  She denies any associated discomfort or distasteful oral drainage.     She does have a history of tobacco abuse and has limited use.         PROCEDURE: CT SOFT TISSUE NECK W CONTRAST 2020 8:52 AM     HISTORY: Neck mass, solitary, afebrile; Mass of left side of neck     COMPARISONS: None.     Meds/Dose Given: Isovue 300, 100ml     TECHNIQUE: CT scan of the neck with IV contrast sagittal coronal  reconstructions were obtained     FINDINGS: The muscles of mastication in the parapharyngeal spaces are  normal. There is a low-density mass in the superficial lobe of the  left parotid measuring 1.6 cm diameter.  There are either separate  nodules were extension of the primary nodule into the junction of the  deep and superficial lobes of the level of the posterior aspect of the  mandible. The right parotid and both submandibular glands appear  normal. The larynx and upper trachea is normal. The thyroid gland is  enlarged with multiple small nodules consistent with multinodular  goiter. The cervical and upper mediastinal lymph nodes are normal in  caliber. Emphysematous changes are noted in both lungs which are  severe.                                                                        IMPRESSION: Left parotid mass.     IMTIAZ RAMSEY MD      FNA completed on 4/20/20   FNA-left parotid mass.:   Negative for malignancy   Specimen Adequacy: Satisfactory for evaluation.     Electronically signed out by:     Daren Boo M.D.   Current Outpatient Rx   Medication Sig Dispense Refill     amLODIPine (NORVASC) 10 MG tablet Take 1 tablet by mouth once daily 90 tablet 0     metoprolol tartrate (LOPRESSOR) 50 MG tablet Take 1 tablet by mouth twice daily 60 tablet 0     albuterol (2.5 MG/3ML) 0.083% neb solution Take 1 vial (2.5 mg) by nebulization every 4 hours as needed for shortness of breath / dyspnea or wheezing 1 Box 11     clopidogrel (PLAVIX) 75 MG tablet Take 1 tablet by mouth once daily 90 tablet 0     FLAXSEED 2,000 mg 2 times daily        furosemide (LASIX) 20 MG tablet Take 1 tablet (20 mg) by mouth daily 30 tablet 0     Ipratropium-Albuterol (COMBIVENT RESPIMAT)  MCG/ACT inhaler Inhale 1 puff into the lungs 4 times daily Not to exceed 6 doses per day. 3 Inhaler 3     ketorolac (ACULAR) 0.5 % ophthalmic solution INSTILL 1 DROP INTO LEFT EYE 4 TIMES DAILY FOR ONE MONTH PATIENT TO START DROPS 2 DAYS PRIOR TO PROCEDURE       lovastatin (MEVACOR) 20 MG tablet TAKE 1 TABLET BY MOUTH ONCE DAILY WITH EVENING MEAL 90 tablet 0     ofloxacin (OCUFLOX) 0.3 % ophthalmic solution INSTILL 1 DROP INTO LEFT EYE 4 TIMES DAILY  "FOR 9 DAYS START DROPS TWO DAYS PRIOR TO PROCEDURE       prednisoLONE acetate (PRED FORTE) 1 % ophthalmic suspension INSTILL 1 DROP INTO LEFT EYE 4 TIMES DAILY FOR 1 MONTH PATIENT TO START DROPS AFTER SURGERY         Allergies: Ciprofloxacin, Influenza vaccines, Morphine, Nitrofurantoin, and Nitrofurantoin macrocrystal     Past Medical History:   Diagnosis Date     Asthma 1/3/2012     CHD (coronary heart disease) 2011     Dyslipidemia 10/06/2011     Tobacco abuse 2011       Past Surgical History:   Procedure Laterality Date     ANGIOGRAM       PHACOEMULSIFICATION WITH STANDARD INTRAOCULAR LENS IMPLANT Left 2021    Procedure: Phacoemulsification of cataract with intraocular lens placement, left;  Surgeon: Fracisco Yarbrough MD;  Location: [facility]     PHACOEMULSIFICATION WITH STANDARD INTRAOCULAR LENS IMPLANT Right 2021    Procedure: CATARACT EXTRACTION WITH INTRA OCULAR LENS RIGHT EYE;  Surgeon: Fracisco Yarbrough MD;  Location: [facility] OR     stents         ENT family history reviewed    Social History     Tobacco Use     Smoking status: Light Tobacco Smoker     Packs/day: 0.10     Years: 20.00     Pack years: 2.00     Types: Cigarettes     Last attempt to quit: 2015     Years since quittin.1     Smokeless tobacco: Never Used     Tobacco comment: does not want quit plan. working on quitting now.   Substance Use Topics     Alcohol use: No     Alcohol/week: 0.0 standard drinks     Drug use: No       Review of Systems  ROS: 10 point ROS neg other than the symptoms noted above in the HPI     Physical Exam  /66 (Cuff Size: Adult Regular)   Pulse 78   Temp 97  F (36.1  C) (Tympanic)   Ht 1.575 m (5' 2\")   Wt 41.7 kg (92 lb)   SpO2 (!) 89%   BMI 16.83 kg/m        General - The patient is well nourished and well developed, and appears thin.  Alert and oriented to person and place, answers questions and cooperates with examination appropriately.   Head and Face - Normocephalic and atraumatic, " with no gross asymmetry noted.  The facial nerve is intact, with strong symmetric movements. Grade 1/6 bilaterally  Voice and Breathing - The patient was breathing comfortably without the use of accessory muscles. There was no wheezing, stridor, or stertor.  The patients voice was clear and strong, and had appropriate pitch and quality. Intermittent throat clearing and coughing during visit.   No gabriella peripheral digital clubbing or cyanosis   Ears -examined under microscopy bilaterally  Cerumen removed bilaterally with a loop and alligator.  the external auditory canals are patent, the right tympanic membrane is intact without effusion, retraction or mass.  Bony landmarks are intact.  Left TM with serous effusion and retraction, no evidence of cholesteatoma. Her TM with severe retraction along ME.   Eyes - Extraocular movements intact, and the pupils were reactive to light.  Sclera were not icteric or injected, conjunctiva were pink and moist.  Mouth - Examination of the oral cavity showed pink, healthy oral mucosa. No lesions or ulcerations noted.  The tongue was mobile and midline, and edentulous with upper and lower dentures.    Throat - The walls of the oropharynx were smooth, pink, moist, symmetric, and had no lesions or ulcerations.  The tonsillar pillars and soft palate were symmetric.  The uvula was midline on elevation.    Neck - Left firm 1.6-1.8 cm preauricular parotid mass with extension  No right parotid mass  No palpable enlarged fixed cervical lymph nodes.  No neck cysts or unusual tenderness to palpation.   No palpable fixed thyroid nodules or concerning goiter.  The trachea is grossly midline.   Nose - External contour is symmetric, no gross deflection or scars.  Nasal mucosa is pink and moist with no abnormal mucus.  The septum and turbinates were evaluated: nonobstructive.  No polyps, masses, or purulence noted on examination.     Nasopharyngoscopy    After informed consent was obtained and the  nose was anesthetized with pledgets soaked with neosynepherine/lidocaine, the scope was advanced into the nares.  There is no purulence and/ or excessive swelling.  Eustachian tubes are patent, no nasopharyngeal mass.  The scope was removed she tolerated this well       ASSESSMENT:    ICD-10-CM    1. Mass of left parotid gland  K11.8    2. COME (chronic otitis media with effusion), left  H65.492    3. Mixed conductive and sensorineural hearing loss of left ear with restricted hearing of right ear  H90.A32    4. Tobacco abuse counseling  Z71.6          Discussed options with Sheila, she will return for audiogram and tube placement with Dr. Benton. I did not complete myringotomy due to severe retraction seen on examination today.   I will defer for Dr. Benton to place tube.     Patient was referred prior to [place] [place] [facility] regarding parotid surgery, Dr. Hinds. Referral placed. I discussed repeat imaging, she declined at this time.   Parotidectomy and facial nerve anatomy briefly discussed         Quit tobacco. However, she has significantly decreased use.         Adrienne Higgins PA-C  ENT  [facility], [place]          Again, thank you for allowing me to participate in the care of your patient.        Sincerely,        Adrienne Higgins PA-C

## 2021-10-25 DIAGNOSIS — I10 BENIGN ESSENTIAL HYPERTENSION: ICD-10-CM

## 2021-10-27 NOTE — TELEPHONE ENCOUNTER
Metoprolol      Last Written Prescription Date:  10/1/21  Last Fill Quantity: 60,   # refills: 0  Last Office Visit: 9/29/21  Future Office visit:       Routing refill request to provider for review/approval because:

## 2021-10-28 RX ORDER — METOPROLOL TARTRATE 50 MG
TABLET ORAL
Qty: 60 TABLET | Refills: 0 | Status: SHIPPED | OUTPATIENT
Start: 2021-10-28 | End: 2021-12-22

## 2021-11-22 DIAGNOSIS — E78.2 MIXED HYPERLIPIDEMIA: ICD-10-CM

## 2021-11-22 DIAGNOSIS — I10 BENIGN ESSENTIAL HYPERTENSION: Primary | ICD-10-CM

## 2021-11-22 NOTE — PROGRESS NOTES
Otolaryngology Progress Note          Sheila Webster is a 76 year old female evaluation of hearing loss and a parotid mass.  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  This is been present over 4 years.  It has become more firm over the past 2 years    I last saw her on 4/30/2020 and fine-needle aspiration was benign    She continues to deny fluctuation in size of the mass with oral intake  She denies facial weakness  She denies associated discomfort or distasteful oral discharge    History of tobacco abuse she quit last year    CT neck dated 1/29/2020 shows a superficial left parotid 1.6 diameter mass.  There may be 2 separate nodules with extension of the primary tumor to the junction of the deep and superficial parotid.    Thyroid is consistent with multinodular goiter without any large dominant mass over 4 cm  No cervical lymphadenopathy  No right parotid mass     I last saw her on 4/30/2020 and left parotid fine-needle aspiration was negative    She also has a history of left otitis media with effusion and retraction without evidence of cholesteatoma at her last visit.  Stated history of extensive ear surgery in the distant past at the [facility] with described tympanomastoidectomy for cholesteatoma    Patient Name: SHEILA WEBSTER   MR#: [id number]   Specimen #: [id number]   Collected: 4/30/2020   Received: 4/30/2020   Reported: 5/1/2020 12:35   Ordering Phy(s): KIM GOMES   Additional Phy(s): SAMSON WICK     For improved result formatting, select 'View Enhanced Report Format' under    Linked Documents section.     SPECIMEN/STAIN PROCESS:   FNA-left parotid mass.        Pap-Cyto x 9  "    ----------------------------------------------------------------     CYTOLOGIC INTERPRETATION:      FNA-left parotid mass.:   Negative for malignancy   Specimen Adequacy: Satisfactory for evaluation.     Electronically signed out by:     Daren Boo M.D.     Jennifer states she does not want a general anesthetic if surgery is necessary    Physical Exam  /64 (Cuff Size: Adult Regular)   Pulse 83   Temp 97.5  F (36.4  C) (Tympanic)   Ht 1.575 m (5' 2\")   Wt 42.6 kg (94 lb)   SpO2 (!) 81%   BMI 17.19 kg/m    General - The patient is well nourished and well developed, and appears to have good nutritional status.  Alert and oriented to person and place, interactive.  Head and Face - Normocephalic and atraumatic, with no gross asymmetry noted of the contour of the facial features.  The facial nerve is intact, with strong symmetric movements.  Grade 1/6 bilaterally  Neck-no palpable lymphadenopathy or thyroid mass.  Trachea is midline.  Firm left tail of parotid 2 cm mass  Eyes - Extraocular movements intact.   Ears-examined under microscopy bilaterally  Cerumen removed bilaterally  Right TM intact, no effusion or retraction  Left TM with atelectasis, severe pars tensa and attic retraction, no cholesteatoma  Nose - Nasal mucosa is pink and moist with no abnormal mucus.  The septum was grossly midline and non-obstructive, turbinates of normal size and position.  No polyps, masses, or purulence noted on examination.  Mouth - Examination of the oral cavity shows pink, healthy, moist mucosa.  No lesions or ulceration noted.  The dentition are in good repair.  The tongue is mobile and midline.  Throat - The walls of the oropharynx were smooth, pink, moist, symmetric, and had no lesions or ulcerations.  The tonsillar pillars and soft palate were symmetric.  The uvula was midline on elevation.      LEFT Myringotomy with Tube Placement    Procedure - I discussed the risks and complications of  Left tympanostomy tube " insertion  Including topical anesthesia, bleeding, infection, change in hearing or hearing loss, tympanic membrane perforation, need for additional surgery, chronic ear drainage, tube occlusion or need for tube reinsertion, cholesteatoma.   All questions were answered and the patient/and or guardian wishes are to proceed with surgical intervention.   After discussion of the risks and benefits of myringotomy, informed consent was signed and placed in the chart.    I proceeded to position the patient in a supine position in the examination chair.  Using the binocular surgical microscope, I then proceeded to clean the  LEFT canal of cerumen and squamous debris.  I was able to see the tympanic membrane.  Using a small cotton tipped applicator, I applied a tiny coating of phenol onto the tympanic membrane.  After visualizing a good gris, I then proceeded to use a myringotomy knife to make a radially oriented incision in the tympanic membrane.  Serous effusion noted and removed with a #5 and #3 suction.  Next, I proceeded to place a t- tube through the incision.  After confirming good positioning and a clearly visible open tube, otic drops were applied and a cotton ball was inserted into the canal.      Impression/Plan  Sheila Nuñez is a 76 year old female    ICD-10-CM    1. Mass of left parotid gland  K11.8 CT Soft Tissue Neck w Contrast   2. Mixed conductive and sensorineural hearing loss of left ear with restricted hearing of right ear  H90.A32 Adult Audiology Referral     ofloxacin (FLOXIN) 0.3 % otic solution     ofloxacin (FLOXIN) 0.3 % otic solution     DISCONTINUED: ofloxacin (FLOXIN) 0.3 % otic solution   3. OME (otitis media with effusion), left  H65.92 Adult Audiology Referral     ofloxacin (FLOXIN) 0.3 % otic solution     ofloxacin (FLOXIN) 0.3 % otic solution     DISCONTINUED: ofloxacin (FLOXIN) 0.3 % otic solution   4. S/P myringotomy with insertion of tube  Z96.22 ofloxacin (FLOXIN) 0.3 % otic  solution     ofloxacin (FLOXIN) 0.3 % otic solution     DISCONTINUED: ofloxacin (FLOXIN) 0.3 % otic solution           Repeat CT soft tissue neck attention left parotid mass    Refer to [facility] or [facility] ENT to consider parotidectomy    May not be surgical candidate due to severe COPD and at this point she declines consideration of GETA        Verbal and printed tube instructions provided          Raine Benton D.O.  Otolaryngology/Head and Neck Surgery  Allergy

## 2021-11-22 NOTE — LETTER
November 24, 2021      Sheila Nuñez  [address]   [facility] [address]        Dear Sheila,     We are concerned about your health care.  We recently provided you with medication refills.  Lab tests are required every year in order to continue refilling your lovastatin (MEVACOR) 20 MG tablet.  Orders have been placed in our computer and should be accessible at most [facility] labs. You WILL need to be fasting for this lab. Please complete this as soon as possible. If you have any questions please call us at [phone].        Sincerely,    David Littlejohn MD

## 2021-11-23 ENCOUNTER — OFFICE VISIT (OUTPATIENT)
Dept: AUDIOLOGY | Facility: OTHER | Age: 76
End: 2021-11-23
Attending: AUDIOLOGIST
Payer: COMMERCIAL

## 2021-11-23 ENCOUNTER — OFFICE VISIT (OUTPATIENT)
Dept: OTOLARYNGOLOGY | Facility: OTHER | Age: 76
End: 2021-11-23
Attending: OTOLARYNGOLOGY
Payer: COMMERCIAL

## 2021-11-23 VITALS
TEMPERATURE: 97.5 F | WEIGHT: 94 LBS | OXYGEN SATURATION: 81 % | SYSTOLIC BLOOD PRESSURE: 126 MMHG | HEIGHT: 62 IN | HEART RATE: 83 BPM | DIASTOLIC BLOOD PRESSURE: 64 MMHG | BODY MASS INDEX: 17.3 KG/M2

## 2021-11-23 DIAGNOSIS — H90.A32 MIXED CONDUCTIVE AND SENSORINEURAL HEARING LOSS OF LEFT EAR WITH RESTRICTED HEARING OF RIGHT EAR: ICD-10-CM

## 2021-11-23 DIAGNOSIS — H69.92 DYSFUNCTION OF LEFT EUSTACHIAN TUBE: ICD-10-CM

## 2021-11-23 DIAGNOSIS — Z96.22 S/P MYRINGOTOMY WITH INSERTION OF TUBE: ICD-10-CM

## 2021-11-23 DIAGNOSIS — H65.492 COME (CHRONIC OTITIS MEDIA WITH EFFUSION), LEFT: ICD-10-CM

## 2021-11-23 DIAGNOSIS — K11.8 MASS OF LEFT PAROTID GLAND: Primary | ICD-10-CM

## 2021-11-23 DIAGNOSIS — H65.92 OME (OTITIS MEDIA WITH EFFUSION), LEFT: ICD-10-CM

## 2021-11-23 DIAGNOSIS — H90.A21 SENSORINEURAL HEARING LOSS (SNHL) OF RIGHT EAR WITH RESTRICTED HEARING OF LEFT EAR: Primary | ICD-10-CM

## 2021-11-23 PROCEDURE — 92557 COMPREHENSIVE HEARING TEST: CPT | Performed by: AUDIOLOGIST

## 2021-11-23 PROCEDURE — 92550 TYMPANOMETRY & REFLEX THRESH: CPT | Performed by: AUDIOLOGIST

## 2021-11-23 PROCEDURE — 69433 CREATE EARDRUM OPENING: CPT | Mod: LT | Performed by: OTOLARYNGOLOGY

## 2021-11-23 PROCEDURE — 99214 OFFICE O/P EST MOD 30 MIN: CPT | Mod: 25 | Performed by: OTOLARYNGOLOGY

## 2021-11-23 RX ORDER — OFLOXACIN 3 MG/ML
5 SOLUTION AURICULAR (OTIC) 2 TIMES DAILY
Qty: 5 ML | Refills: 1 | Status: SHIPPED | OUTPATIENT
Start: 2021-11-23 | End: 2021-11-30

## 2021-11-23 RX ORDER — OFLOXACIN 3 MG/ML
5 SOLUTION AURICULAR (OTIC) 2 TIMES DAILY
Qty: 5 ML | Refills: 1 | Status: SHIPPED | OUTPATIENT
Start: 2021-11-23 | End: 2021-11-23

## 2021-11-23 ASSESSMENT — PAIN SCALES - GENERAL: PAINLEVEL: NO PAIN (0)

## 2021-11-23 ASSESSMENT — MIFFLIN-ST. JEOR: SCORE: 869.63

## 2021-11-23 NOTE — PATIENT INSTRUCTIONS
Thank you for allowing Dr. Benton and our ENT team to participate in your care.  If your medications are too expensive, please give the nurse a call.  We can possibly change this medication.  If you have a scheduling or an appointment question please contact our Health Unit Coordinator at their direct line [phone].   ALL nursing questions or concerns can be directed to your ENT nurse at: [phone] - Ayesha    Complete Neck CT Scan      Instructions for Myringotomy Tubes ( Ear Tubes)        Recovery - The placement of ear tubes is a brief operation, and therefore the recovery from the anesthetic is usually less than a day.  However, in young children the sleep patterns, feeding, and behavior can be altered for several days.  Try to return to the daily routine as soon as possible and this issue will resolve without problems.  There are no restrictions to diet or activity after ear tube placement.    Medications - Children and adults can return to all preoperative medications after this procedure, including blood thinners.  You were sent home with ear drops, please use them as directed to assist in the rapid healing of the ear drum around the tube.  Pain medication may have been sent home with you, but a vast majority of the time, over the counter Tylenol or ibuprofen (advil) I sufficient. Finish prescription ear drops (4 drops twice a day).     Complications - A low grade fever (up to 100 degrees ) is not unusual in the day after tubes are placed.  Treat this with cool wash cloths to the forehead and Tylenol.  If the fever is higher, or does not respond to medication, call the Doctor s office or call service after hours.  A small amount of bloody drainage can occur for a day or two after ear tubes, and is perfectly normal, continue the ear drops as directed and it will clear up.    Water Precautions - Recent clinical research has shown that absolute water precautions are not always necessary.  Ear  plugs or water head bands are not necessary unless the ear is actively draining, or if your child does not like the sensation of water in the ear.    Follow up - Approximately 1 month after the tubes are placed I like to examine the ears to make sure there are no signs of complications, which are extremely rare.  You should already have an appointment in 1 month with ENT PA and audiology.  If not, call our office at [phone].  In some unusual cases the ears  reject  the tubes.  Depending on the situation, a hearing test may or may not be performed at that time.  Afterwards, follow up is done every 6 months, but of course earlier if there are any issues or problems.    Advantages of Tubes - After ear tube placement, there are certain benefits from having a direct communication of the middle ear space with the ear canal.  In the event of drainage from the ears with ear tubes in place ( which is common with colds and flus ) use the ear drops you were discharged home with using the same dosage and instructions.  This will clear up the ears without the need for oral antibiotics a majority of the time.  Another advantage is that with tubes in place, the ears automatically adjust to changes in atmospheric pressure ( such as in airplanes or elevation ).  In other words, if the tubes are open the ears will not hurt or pop!

## 2021-11-23 NOTE — LETTER
11/23/2021         RE: Sebastian Webster  [address]  [facility] [place] [address]        Dear Colleague,    Thank you for referring your patient, Sebastian Webster, to the [facility]. Please see a copy of my visit note below.    Otolaryngology Progress Note          Sebastian Webster is a 76 year old female evaluation of hearing loss and a parotid mass.  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  This is been present over 4 years.  It has become more firm over the past 2 years    I last saw her on 4/30/2020 and fine-needle aspiration was benign    She continues to deny fluctuation in size of the mass with oral intake  She denies facial weakness  She denies associated discomfort or distasteful oral discharge    History of tobacco abuse she quit last year    CT neck dated 1/29/2020 shows a superficial left parotid 1.6 diameter mass.  There may be 2 separate nodules with extension of the primary tumor to the junction of the deep and superficial parotid.    Thyroid is consistent with multinodular goiter without any large dominant mass over 4 cm  No cervical lymphadenopathy  No right parotid mass     I last saw her on 4/30/2020 and left parotid fine-needle aspiration was negative    She also has a history of left otitis media with effusion and retraction without evidence of cholesteatoma at her last visit.  Stated history of extensive ear surgery in the distant past at the [facility] with described tympanomastoidectomy for cholesteatoma    Patient Name: SEBASTIAN WEBSTER   MR#: [id number]   Specimen #: [id number]   Collected: 4/30/2020   Received: 4/30/2020   Reported: 5/1/2020 12:35  "  Ordering Phy(s): KIM GOMES   Additional Phy(s): SAMSON WICK     For improved result formatting, select 'View Enhanced Report Format' under    Linked Documents section.     SPECIMEN/STAIN PROCESS:   FNA-left parotid mass.        Pap-Cyto x 9     ----------------------------------------------------------------     CYTOLOGIC INTERPRETATION:      FNA-left parotid mass.:   Negative for malignancy   Specimen Adequacy: Satisfactory for evaluation.     Electronically signed out by:     JERILYN Cherry states she does not want a general anesthetic if surgery is necessary    Physical Exam  /64 (Cuff Size: Adult Regular)   Pulse 83   Temp 97.5  F (36.4  C) (Tympanic)   Ht 1.575 m (5' 2\")   Wt 42.6 kg (94 lb)   SpO2 (!) 81%   BMI 17.19 kg/m    General - The patient is well nourished and well developed, and appears to have good nutritional status.  Alert and oriented to person and place, interactive.  Head and Face - Normocephalic and atraumatic, with no gross asymmetry noted of the contour of the facial features.  The facial nerve is intact, with strong symmetric movements.  Grade 1/6 bilaterally  Neck-no palpable lymphadenopathy or thyroid mass.  Trachea is midline.  Firm left tail of parotid 2 cm mass  Eyes - Extraocular movements intact.   Ears-examined under microscopy bilaterally  Cerumen removed bilaterally  Right TM intact, no effusion or retraction  Left TM with atelectasis, severe pars tensa and attic retraction, no cholesteatoma  Nose - Nasal mucosa is pink and moist with no abnormal mucus.  The septum was grossly midline and non-obstructive, turbinates of normal size and position.  No polyps, masses, or purulence noted on examination.  Mouth - Examination of the oral cavity shows pink, healthy, moist mucosa.  No lesions or ulceration noted.  The dentition are in good repair.  The tongue is mobile and midline.  Throat - The walls of the oropharynx were smooth, pink, moist, " symmetric, and had no lesions or ulcerations.  The tonsillar pillars and soft palate were symmetric.  The uvula was midline on elevation.      LEFT Myringotomy with Tube Placement    Procedure - I discussed the risks and complications of  Left tympanostomy tube insertion  Including topical anesthesia, bleeding, infection, change in hearing or hearing loss, tympanic membrane perforation, need for additional surgery, chronic ear drainage, tube occlusion or need for tube reinsertion, cholesteatoma.   All questions were answered and the patient/and or guardian wishes are to proceed with surgical intervention.   After discussion of the risks and benefits of myringotomy, informed consent was signed and placed in the chart.    I proceeded to position the patient in a supine position in the examination chair.  Using the binocular surgical microscope, I then proceeded to clean the  LEFT canal of cerumen and squamous debris.  I was able to see the tympanic membrane.  Using a small cotton tipped applicator, I applied a tiny coating of phenol onto the tympanic membrane.  After visualizing a good gris, I then proceeded to use a myringotomy knife to make a radially oriented incision in the tympanic membrane.  Serous effusion noted and removed with a #5 and #3 suction.  Next, I proceeded to place a t- tube through the incision.  After confirming good positioning and a clearly visible open tube, otic drops were applied and a cotton ball was inserted into the canal.      Impression/Plan  Sheila Nuñez is a 76 year old female    ICD-10-CM    1. Mass of left parotid gland  K11.8 CT Soft Tissue Neck w Contrast   2. Mixed conductive and sensorineural hearing loss of left ear with restricted hearing of right ear  H90.A32 Adult Audiology Referral     ofloxacin (FLOXIN) 0.3 % otic solution     ofloxacin (FLOXIN) 0.3 % otic solution     DISCONTINUED: ofloxacin (FLOXIN) 0.3 % otic solution   3. OME (otitis media with effusion), left   H65.92 Adult Audiology Referral     ofloxacin (FLOXIN) 0.3 % otic solution     ofloxacin (FLOXIN) 0.3 % otic solution     DISCONTINUED: ofloxacin (FLOXIN) 0.3 % otic solution   4. S/P myringotomy with insertion of tube  Z96.22 ofloxacin (FLOXIN) 0.3 % otic solution     ofloxacin (FLOXIN) 0.3 % otic solution     DISCONTINUED: ofloxacin (FLOXIN) 0.3 % otic solution           Repeat CT soft tissue neck attention left parotid mass    Refer to [facility] or [facility] ENT to consider parotidectomy    May not be surgical candidate due to severe COPD and at this point she declines consideration of GETA        Verbal and printed tube instructions provided          Raine Benton D.O.  Otolaryngology/Head and Neck Surgery  Allergy              Again, thank you for allowing me to participate in the care of your patient.        Sincerely,        Raine Benton MD

## 2021-11-23 NOTE — PROGRESS NOTES
Audiology Evaluation Completed. Please refer SCANNED AUDIOGRAM and/or TYMPANOGRAM for BACKGROUND, RESULTS, RECOMMENDATIONS.      Martha GARCIA, [facility]  Audiologist #[id number]

## 2021-11-23 NOTE — NURSING NOTE
"Chief Complaint   Patient presents with     Follow Up     Left Chronic Otitis Media with Effusion     Minor Procedure     Tube Placement       Initial /64 (Cuff Size: Adult Regular)   Pulse 83   Temp 97.5  F (36.4  C) (Tympanic)   Ht 1.575 m (5' 2\")   Wt 42.6 kg (94 lb)   SpO2 (!) 81%   BMI 17.19 kg/m   Estimated body mass index is 17.19 kg/m  as calculated from the following:    Height as of this encounter: 1.575 m (5' 2\").    Weight as of this encounter: 42.6 kg (94 lb).  Medication Reconciliation: complete  Ayesha Maradiaga LPN    "

## 2021-11-24 RX ORDER — LOVASTATIN 20 MG
TABLET ORAL
Qty: 90 TABLET | Refills: 0 | Status: SHIPPED | OUTPATIENT
Start: 2021-11-24 | End: 2022-02-25

## 2021-11-24 NOTE — TELEPHONE ENCOUNTER
lovastatin (MEVACOR) 20 MG tablet  Last Written Prescription Date:  8/18/21  Last Fill Quantity: 90,  # refills: 0   Last office visit: 9/29/2021   Future Office Visit:      Routing refill request to provider for review/approval because:  Labs not current: LDL   Labs ordered. Letter sent.

## 2021-12-20 DIAGNOSIS — I10 ESSENTIAL HYPERTENSION: ICD-10-CM

## 2021-12-20 DIAGNOSIS — I10 BENIGN ESSENTIAL HYPERTENSION: ICD-10-CM

## 2021-12-20 DIAGNOSIS — I25.810 CORONARY ARTERY DISEASE INVOLVING CORONARY BYPASS GRAFT OF NATIVE HEART WITHOUT ANGINA PECTORIS: ICD-10-CM

## 2021-12-21 NOTE — TELEPHONE ENCOUNTER
Norvasc      Last Written Prescription Date:  10/1/21  Last Fill Quantity: 90,   # refills: 0  Last Office Visit: 9/29/21  Future Office visit:       Routing refill request to provider for review/approval because:      Plavix      Last Written Prescription Date:  9/28/21  Last Fill Quantity: 90,   # refills: 0  Last Office Visit: 9/29/21  Future Office visit:       Routing refill request to provider for review/approval because:      Metoprolol      Last Written Prescription Date:  10/28/21  Last Fill Quantity: 60,   # refills: 0  Last Office Visit: 9/29/21  Future Office visit:       Routing refill request to provider for review/approval because:

## 2021-12-22 RX ORDER — AMLODIPINE BESYLATE 10 MG/1
TABLET ORAL
Qty: 90 TABLET | Refills: 0 | Status: SHIPPED | OUTPATIENT
Start: 2021-12-22 | End: 2022-03-21

## 2021-12-22 RX ORDER — CLOPIDOGREL BISULFATE 75 MG/1
TABLET ORAL
Qty: 90 TABLET | Refills: 0 | Status: SHIPPED | OUTPATIENT
Start: 2021-12-22 | End: 2022-03-21

## 2021-12-22 RX ORDER — METOPROLOL TARTRATE 50 MG
TABLET ORAL
Qty: 60 TABLET | Refills: 0 | Status: SHIPPED | OUTPATIENT
Start: 2021-12-22 | End: 2022-01-24

## 2022-01-17 ENCOUNTER — OFFICE VISIT (OUTPATIENT)
Dept: AUDIOLOGY | Facility: OTHER | Age: 77
End: 2022-01-17
Attending: AUDIOLOGIST
Payer: COMMERCIAL

## 2022-01-17 ENCOUNTER — OFFICE VISIT (OUTPATIENT)
Dept: OTOLARYNGOLOGY | Facility: OTHER | Age: 77
End: 2022-01-17
Attending: AUDIOLOGIST
Payer: COMMERCIAL

## 2022-01-17 VITALS
HEIGHT: 62 IN | OXYGEN SATURATION: 87 % | WEIGHT: 87 LBS | DIASTOLIC BLOOD PRESSURE: 60 MMHG | BODY MASS INDEX: 16.01 KG/M2 | TEMPERATURE: 96.8 F | HEART RATE: 73 BPM | SYSTOLIC BLOOD PRESSURE: 134 MMHG

## 2022-01-17 DIAGNOSIS — H90.A21 SENSORINEURAL HEARING LOSS (SNHL) OF RIGHT EAR WITH RESTRICTED HEARING OF LEFT EAR: Primary | ICD-10-CM

## 2022-01-17 DIAGNOSIS — H90.A21 SENSORINEURAL HEARING LOSS (SNHL) OF RIGHT EAR WITH RESTRICTED HEARING OF LEFT EAR: ICD-10-CM

## 2022-01-17 DIAGNOSIS — Z96.22 S/P MYRINGOTOMY WITH INSERTION OF TUBE: Primary | ICD-10-CM

## 2022-01-17 DIAGNOSIS — H90.A32 MIXED CONDUCTIVE AND SENSORINEURAL HEARING LOSS OF LEFT EAR WITH RESTRICTED HEARING OF RIGHT EAR: ICD-10-CM

## 2022-01-17 DIAGNOSIS — H65.92 OME (OTITIS MEDIA WITH EFFUSION), LEFT: ICD-10-CM

## 2022-01-17 PROCEDURE — 99213 OFFICE O/P EST LOW 20 MIN: CPT | Performed by: NURSE PRACTITIONER

## 2022-01-17 PROCEDURE — 92567 TYMPANOMETRY: CPT | Mod: LT | Performed by: AUDIOLOGIST

## 2022-01-17 PROCEDURE — 92556 SPEECH AUDIOMETRY COMPLETE: CPT | Mod: LT | Performed by: AUDIOLOGIST

## 2022-01-17 PROCEDURE — 92552 PURE TONE AUDIOMETRY AIR: CPT | Mod: 52 | Performed by: AUDIOLOGIST

## 2022-01-17 ASSESSMENT — PAIN SCALES - GENERAL: PAINLEVEL: NO PAIN (0)

## 2022-01-17 ASSESSMENT — MIFFLIN-ST. JEOR: SCORE: 837.88

## 2022-01-17 NOTE — PATIENT INSTRUCTIONS
Thank you for allowing Qi Jefferson and our ENT team to participate in your care.  If your medications are too expensive, please give the nurse a call.  We can possibly change this medication.  If you have a scheduling or an appointment question please contact our Health Unit Coordinator at their direct line [phone].   ALL nursing questions or concerns can be directed to your ENT nurse at: [phone] - Mfk     6 month follow up with Qi Jefferson; Ear Check

## 2022-01-17 NOTE — LETTER
1/17/2022         RE: Sheila Nuñez  [address]  [facility] [address]        Dear Colleague,    Thank you for referring your patient, Sheila Nuñez, to the [facility]. Please see a copy of my visit note below.    Otolaryngology Note         Chief Complaint:     Patient presents with:  Suspected Hearing Decrease: HEA            History of Present Illness:     Sheila Nuñez is a 76 year old female seen today for follow up left ear tube check.      She denies otalgia or otorrhea.    She reports her hearing is significantly improved since tube insertion.    No recent OM, abx use, otics    Audiogram (post operative) completed today left only:  Tympanograms are Type B large volume  Thresholds are improved ~ 30 dB throughout. (best results TDH over inserts) moderate to mild sloping to moderate mixed loss.  Speech reception thresholds are in good agreement with pure tone average.  Word discrimination scores are excellent at supra-thresholds level/MCL; UCL at 105dBHL.           Surgical Details:     Left T-tube insertion completed 11/23/21 in office completed by Dr Benton         Medications:     Current Outpatient Rx   Medication Sig Dispense Refill     albuterol (2.5 MG/3ML) 0.083% neb solution Take 1 vial (2.5 mg) by nebulization every 4 hours as needed for shortness of breath / dyspnea or wheezing 1 Box 11     amLODIPine (NORVASC) 10 MG tablet Take 1 tablet by mouth once daily 90 tablet 0     clopidogrel (PLAVIX) 75 MG tablet Take 1 tablet by mouth once daily 90 tablet 0     FLAXSEED 2,000 mg 2 times daily        furosemide (LASIX) 20 MG tablet Take 1 tablet (20 mg) by mouth daily 30 tablet 0     Ipratropium-Albuterol (COMBIVENT RESPIMAT)  MCG/ACT inhaler Inhale 1 puff into the lungs 4 times daily Not to exceed 6 doses per day. 3 Inhaler 3     lovastatin (MEVACOR) 20 MG tablet TAKE 1 TABLET BY MOUTH ONCE DAILY WITH EVENING MEAL 90 tablet 0     metoprolol tartrate  "(LOPRESSOR) 50 MG tablet Take 1 tablet by mouth twice daily 60 tablet 0            Allergies:     Allergies: Ciprofloxacin, Influenza vaccines, Morphine, Nitrofurantoin, and Nitrofurantoin macrocrystal          Past Medical History:     Past Medical History:   Diagnosis Date     Asthma 1/3/2012     CHD (coronary heart disease) 2011     Dyslipidemia 10/06/2011     Tobacco abuse 2011            Past Surgical History:     Past Surgical History:   Procedure Laterality Date     ANGIOGRAM       PHACOEMULSIFICATION WITH STANDARD INTRAOCULAR LENS IMPLANT Left 2021    Procedure: Phacoemulsification of cataract with intraocular lens placement, left;  Surgeon: Fracisco Yarbrough MD;  Location: [facility]     PHACOEMULSIFICATION WITH STANDARD INTRAOCULAR LENS IMPLANT Right 2021    Procedure: CATARACT EXTRACTION WITH INTRA OCULAR LENS RIGHT EYE;  Surgeon: Fracisco Yarbrough MD;  Location: [facility] OR     stents         ENT family history reviewed         Social History:     Social History     Tobacco Use     Smoking status: Light Tobacco Smoker     Packs/day: 0.10     Years: 20.00     Pack years: 2.00     Types: Cigarettes     Last attempt to quit: 2015     Years since quittin.3     Smokeless tobacco: Never Used     Tobacco comment: does not want quit plan. working on quitting now.   Substance Use Topics     Alcohol use: No     Alcohol/week: 0.0 standard drinks     Drug use: No            Review of Systems:     ROS: See HPI         Physical Exam:     /60 (BP Location: Right arm, Cuff Size: Adult Regular)   Pulse 73   Temp 96.8  F (36  C) (Tympanic)   Ht 1.575 m (5' 2\")   Wt 39.5 kg (87 lb)   SpO2 (!) 87%   BMI 15.91 kg/m      General - The patient is well nourished and well developed, and appears to have good nutritional status.  Alert and oriented to person and place, answers questions and cooperates with examination appropriately.   Head and Face - Normocephalic and atraumatic, with no gross " asymmetry noted.  The facial nerve is intact, with strong symmetric movements.  Voice and Breathing - The patient was breathing comfortably without the use of accessory muscles. There was no wheezing, stridor. The patients voice was clear and strong, and had appropriate pitch and quality.  Ears - External ear normal. Canals are patent. Right tympanic membrane is intact without effusion, retraction or mass. Left tympanic membrane is intact without effusion, retraction or mass.   Left TM has patent appearing T-tube in good position.   Eyes - Extraocular movements intact, sclera were not icteric or injected, conjunctiva were pink and moist.  Mouth - Examination of the oral cavity showed pink, healthy oral mucosa. Upper and lower dentures.   No lesions or ulcerations noted. The tongue was mobile and midline.   Throat - The walls of the oropharynx were smooth, pink, moist, symmetric, and had no lesions or ulcerations.  The tonsillar pillars and soft palate were symmetric. The uvula was midline on elevation.    Neck - No palpable lymphadenopathy  Nose - External contour is symmetric, no gross deflection or scars.  Nasal mucosa is pink and moist with no abnormal mucus.  The septum and turbinates were evaluated with nasal speculum, there is scant clear mucin, no polyps, masses, or purulence noted on examination of anterior nasal cavity.         Assessment and Plan:       ICD-10-CM    1. S/P myringotomy with insertion of tube  Z96.22    2. Mixed conductive and sensorineural hearing loss of left ear with restricted hearing of right ear  H90.A32    3. Sensorineural hearing loss (SNHL) of right ear with restricted hearing of left ear  H90.A21      Significant improvement in hearing of left ear after tube placement  She is happy with the results  Follow up in 6 months for tube check    Qi ABREU  [facility]        Again, thank you for allowing me to participate in the care of your patient.         Sincerely,        Qi Jefferson, NP

## 2022-01-17 NOTE — PROGRESS NOTES
Otolaryngology Note         Chief Complaint:     Patient presents with:  Suspected Hearing Decrease: HEA            History of Present Illness:     Sheila Nuñez is a 76 year old female seen today for follow up left ear tube check.      She denies otalgia or otorrhea.    She reports her hearing is significantly improved since tube insertion.    No recent OM, abx use, otics    Audiogram (post operative) completed today left only:  Tympanograms are Type B large volume  Thresholds are improved ~ 30 dB throughout. (best results TDH over inserts) moderate to mild sloping to moderate mixed loss.  Speech reception thresholds are in good agreement with pure tone average.  Word discrimination scores are excellent at supra-thresholds level/MCL; UCL at 105dBHL.           Surgical Details:     Left T-tube insertion completed 11/23/21 in office completed by Dr Benton         Medications:     Current Outpatient Rx   Medication Sig Dispense Refill     albuterol (2.5 MG/3ML) 0.083% neb solution Take 1 vial (2.5 mg) by nebulization every 4 hours as needed for shortness of breath / dyspnea or wheezing 1 Box 11     amLODIPine (NORVASC) 10 MG tablet Take 1 tablet by mouth once daily 90 tablet 0     clopidogrel (PLAVIX) 75 MG tablet Take 1 tablet by mouth once daily 90 tablet 0     FLAXSEED 2,000 mg 2 times daily        furosemide (LASIX) 20 MG tablet Take 1 tablet (20 mg) by mouth daily 30 tablet 0     Ipratropium-Albuterol (COMBIVENT RESPIMAT)  MCG/ACT inhaler Inhale 1 puff into the lungs 4 times daily Not to exceed 6 doses per day. 3 Inhaler 3     lovastatin (MEVACOR) 20 MG tablet TAKE 1 TABLET BY MOUTH ONCE DAILY WITH EVENING MEAL 90 tablet 0     metoprolol tartrate (LOPRESSOR) 50 MG tablet Take 1 tablet by mouth twice daily 60 tablet 0            Allergies:     Allergies: Ciprofloxacin, Influenza vaccines, Morphine, Nitrofurantoin, and Nitrofurantoin macrocrystal          Past Medical History:     Past Medical  "History:   Diagnosis Date     Asthma 1/3/2012     CHD (coronary heart disease) 2011     Dyslipidemia 10/06/2011     Tobacco abuse 2011            Past Surgical History:     Past Surgical History:   Procedure Laterality Date     ANGIOGRAM       PHACOEMULSIFICATION WITH STANDARD INTRAOCULAR LENS IMPLANT Left 2021    Procedure: Phacoemulsification of cataract with intraocular lens placement, left;  Surgeon: Fracisco Yarbrough MD;  Location: [facility]     PHACOEMULSIFICATION WITH STANDARD INTRAOCULAR LENS IMPLANT Right 2021    Procedure: CATARACT EXTRACTION WITH INTRA OCULAR LENS RIGHT EYE;  Surgeon: Fracisco Yarbrough MD;  Location: [facility] OR     stents         ENT family history reviewed         Social History:     Social History     Tobacco Use     Smoking status: Light Tobacco Smoker     Packs/day: 0.10     Years: 20.00     Pack years: 2.00     Types: Cigarettes     Last attempt to quit: 2015     Years since quittin.3     Smokeless tobacco: Never Used     Tobacco comment: does not want quit plan. working on quitting now.   Substance Use Topics     Alcohol use: No     Alcohol/week: 0.0 standard drinks     Drug use: No            Review of Systems:     ROS: See HPI         Physical Exam:     /60 (BP Location: Right arm, Cuff Size: Adult Regular)   Pulse 73   Temp 96.8  F (36  C) (Tympanic)   Ht 1.575 m (5' 2\")   Wt 39.5 kg (87 lb)   SpO2 (!) 87%   BMI 15.91 kg/m      General - The patient is well nourished and well developed, and appears to have good nutritional status.  Alert and oriented to person and place, answers questions and cooperates with examination appropriately.   Head and Face - Normocephalic and atraumatic, with no gross asymmetry noted.  The facial nerve is intact, with strong symmetric movements.  Voice and Breathing - The patient was breathing comfortably without the use of accessory muscles. There was no wheezing, stridor. The patients voice was clear and strong, and " had appropriate pitch and quality.  Ears - External ear normal. Canals are patent. Right tympanic membrane is intact without effusion, retraction or mass. Left tympanic membrane is intact without effusion, retraction or mass.   Left TM has patent appearing T-tube in good position.   Eyes - Extraocular movements intact, sclera were not icteric or injected, conjunctiva were pink and moist.  Mouth - Examination of the oral cavity showed pink, healthy oral mucosa. Upper and lower dentures.   No lesions or ulcerations noted. The tongue was mobile and midline.   Throat - The walls of the oropharynx were smooth, pink, moist, symmetric, and had no lesions or ulcerations.  The tonsillar pillars and soft palate were symmetric. The uvula was midline on elevation.    Neck - No palpable lymphadenopathy  Nose - External contour is symmetric, no gross deflection or scars.  Nasal mucosa is pink and moist with no abnormal mucus.  The septum and turbinates were evaluated with nasal speculum, there is scant clear mucin, no polyps, masses, or purulence noted on examination of anterior nasal cavity.         Assessment and Plan:       ICD-10-CM    1. S/P myringotomy with insertion of tube  Z96.22    2. Mixed conductive and sensorineural hearing loss of left ear with restricted hearing of right ear  H90.A32    3. Sensorineural hearing loss (SNHL) of right ear with restricted hearing of left ear  H90.A21      Significant improvement in hearing of left ear after tube placement  She is happy with the results  Follow up in 6 months for tube check    Qi ABREU  [facility]

## 2022-01-17 NOTE — NURSING NOTE
"Chief Complaint   Patient presents with     Suspected Hearing Decrease     HEA        Initial /60 (BP Location: Right arm, Cuff Size: Adult Regular)   Pulse 73   Temp 96.8  F (36  C) (Tympanic)   Ht 1.575 m (5' 2\")   Wt 39.5 kg (87 lb)   SpO2 (!) 85%   BMI 15.91 kg/m   Estimated body mass index is 15.91 kg/m  as calculated from the following:    Height as of this encounter: 1.575 m (5' 2\").    Weight as of this encounter: 39.5 kg (87 lb).  Medication Reconciliation: complete  Carline Doshi LPN    "

## 2022-01-17 NOTE — PROGRESS NOTES
Audiology Evaluation Completed. Please refer SCANNED AUDIOGRAM and/or TYMPANOGRAM for BACKGROUND, RESULTS, RECOMMENDATIONS.      Martha GARCIA, [facility]  Audiologist #[id number]

## 2022-01-22 DIAGNOSIS — I10 BENIGN ESSENTIAL HYPERTENSION: ICD-10-CM

## 2022-01-24 RX ORDER — METOPROLOL TARTRATE 50 MG
TABLET ORAL
Qty: 60 TABLET | Refills: 0 | Status: SHIPPED | OUTPATIENT
Start: 2022-01-24 | End: 2022-02-25

## 2022-01-24 NOTE — TELEPHONE ENCOUNTER
FRANK    APPT SCHEDULED 12-9-2022  Last Written Prescription Date:  12-  Last Fill Quantity: 60,   # refills: 0  Last Office Visit: 9-  Future Office visit:       Routing refill request to provider for review/approval because:    Drug not on the [facility], [facility] or [facility] refill protocol or controlled substance

## 2022-02-07 ENCOUNTER — OFFICE VISIT (OUTPATIENT)
Dept: FAMILY MEDICINE | Facility: OTHER | Age: 77
End: 2022-02-07
Attending: FAMILY MEDICINE
Payer: COMMERCIAL

## 2022-02-07 VITALS
SYSTOLIC BLOOD PRESSURE: 126 MMHG | HEART RATE: 82 BPM | TEMPERATURE: 98.6 F | DIASTOLIC BLOOD PRESSURE: 82 MMHG | BODY MASS INDEX: 15.36 KG/M2 | WEIGHT: 84 LBS | OXYGEN SATURATION: 82 %

## 2022-02-07 DIAGNOSIS — H10.9 CONJUNCTIVITIS OF RIGHT EYE, UNSPECIFIED CONJUNCTIVITIS TYPE: ICD-10-CM

## 2022-02-07 DIAGNOSIS — R09.02 HYPOXIA: ICD-10-CM

## 2022-02-07 DIAGNOSIS — J01.00 ACUTE NON-RECURRENT MAXILLARY SINUSITIS: ICD-10-CM

## 2022-02-07 DIAGNOSIS — J43.1 PANLOBULAR EMPHYSEMA (H): Primary | ICD-10-CM

## 2022-02-07 PROCEDURE — 99214 OFFICE O/P EST MOD 30 MIN: CPT | Performed by: FAMILY MEDICINE

## 2022-02-07 RX ORDER — AZITHROMYCIN 250 MG/1
TABLET, FILM COATED ORAL
Qty: 6 TABLET | Refills: 0 | Status: SHIPPED | OUTPATIENT
Start: 2022-02-07 | End: 2022-02-12

## 2022-02-07 RX ORDER — TOBRAMYCIN 3 MG/ML
1-2 SOLUTION/ DROPS OPHTHALMIC
Qty: 5 ML | Refills: 1 | Status: SHIPPED | OUTPATIENT
Start: 2022-02-07 | End: 2022-04-19

## 2022-02-07 ASSESSMENT — PAIN SCALES - GENERAL: PAINLEVEL: EXTREME PAIN (8)

## 2022-02-07 NOTE — NURSING NOTE
"Chief Complaint   Patient presents with     URI       Initial /82   Pulse 82   Temp 98.6  F (37  C)   Wt 38.1 kg (84 lb)   SpO2 (!) 82%   BMI 15.36 kg/m   Estimated body mass index is 15.36 kg/m  as calculated from the following:    Height as of 1/17/22: 1.575 m (5' 2\").    Weight as of this encounter: 38.1 kg (84 lb).  Medication Reconciliation: complete  Ruth Snell LPN  "

## 2022-02-07 NOTE — PATIENT INSTRUCTIONS
Patient Education     Conjunctivitis, Nonspecific    The membrane that covers the white part of your eye (the conjunctiva) is inflamed. Inflammation happens when your body responds to an injury, allergic reaction, infection, or illness. Symptoms of inflammation in the eye may include redness, irritation, itching, swelling, or burning. These symptoms should go away within the next 24 hours. Conjunctivitis may be related to a particle that was in your eye. If so, it may wash out with your tears or irrigation treatment. Being exposed to liquid chemicals or fumes may also cause this reaction.    Home care    Put a cold pack on the eye for 20 minutes at a time. This will reduce pain. To make a cold pack, put ice cubes in a plastic bag that seals at the top. Wrap the bag in a clean, thin towel or cloth.    Artificial tears may be prescribed to reduce irritation or redness. These should be used 3 to 4 times a day.    You may use acetaminophen or ibuprofen to control pain, unless another medicine was prescribed. If you have chronic liver or kidney disease, talk with your healthcare provider before using these medicines. Also talk with your provider if you have ever had a stomach ulcer or gastrointestinal bleeding.    Follow-up care  Follow up with your healthcare provider, or as advised.  When to seek medical advice  Call your healthcare provider right away if any of these occur:    Eyelid swells more    Eye pain gets worse    Redness or drainage from the eye gets worse    Blurry vision gets worse or you have increased sensitivity to light    Normal vision does not return within 24 to 48 hours  [employer] last reviewed this educational content on 2/1/2020 2000-2021 [employer]. All rights reserved. This information is not intended as a substitute for professional medical care. Always follow your healthcare professional's instructions.

## 2022-02-07 NOTE — PROGRESS NOTES
Assessment & Plan     Hypoxia  Chronic.  Stable per patient report.  Lengthy review.  On her inhalers and her copd is very stable right now.  She simply lacks a portable oxygen source.  We documented and sent script for concentrator/portable oxygen.    - Miscellaneous Order for DME - ONLY FOR DME    COPD (chronic obstructive pulmonary disease) (H)  As above.  Stable.  Not a flare right now.    - Miscellaneous Order for DME - ONLY FOR DME    Acute non-recurrent maxillary sinusitis  Facial pressure.  Preference for azithro as it is the easiest for her to tolerate.  Sent.  F/u with ongoing concerns.    - azithromycin (ZITHROMAX) 250 MG tablet; Take 2 tablets (500 mg) by mouth daily for 1 day, THEN 1 tablet (250 mg) daily for 4 days.    Conjunctivitis of right eye, unspecified conjunctivitis type  Right eye.  Drops and follow.    - tobramycin (TOBREX) 0.3 % ophthalmic solution; Place 1-2 drops into the right eye every 2 hours                 No follow-ups on file.    David Ruiz MD  [facility]   Sheila is a 76 year old who presents for the following health issues     HPI     RESPIRATORY SYMPTOMS      Duration: Friday    Description  nasal congestion, facial pain/pressure, ear pain right, headache and fatigue/malaise    Severity: moderate    Accompanying signs and symptoms: None    History (predisposing factors):  none    Precipitating or alleviating factors: None    Therapies tried and outcome:  vicks sinus      Face to face       Duration: NA    Description (location/character/radiation): portable oxygen    Intensity:  moderate    Accompanying signs and symptoms: SOB    History (similar episodes/previous evaluation): COPD    Precipitating or alleviating factors: exertopm     Therapies tried and outcome: oxygen       Patient has been assessed for home oxygen needs.    Oxygen readings:  RA-at rest pulse oximetry 82%  RA-during activity/exercise 82%  O2 at 2 liters/minute at  rest 90%  O2 at 2 liters/minute during activity/exercise 89%    Patient is currently on 1 liters oxygen continuous via nasal cannula and should continue oxygen therapy.  Patient is mobile and requires portable oxygen.  Length of need is 99 months.                  Review of Systems   Constitutional, HEENT, cardiovascular, pulmonary, gi and gu systems are negative, except as otherwise noted.      Objective    /82   Pulse 82   Temp 98.6  F (37  C)   Wt 38.1 kg (84 lb)   SpO2 (!) 82%   BMI 15.36 kg/m    Body mass index is 15.36 kg/m .  Physical Exam   GENERAL: healthy, alert and no distress  NECK: no adenopathy, no asymmetry, masses, or scars and thyroid normal to palpation  RESP: lungs clear to auscultation - no rales, rhonchi or wheezes  CV: regular rate and rhythm, normal S1 S2, no S3 or S4, no murmur, click or rub, no peripheral edema and peripheral pulses strong  ABDOMEN: soft, nontender, no hepatosplenomegaly, no masses and bowel sounds normal  MS: no gross musculoskeletal defects noted, no edema

## 2022-02-25 DIAGNOSIS — I10 BENIGN ESSENTIAL HYPERTENSION: ICD-10-CM

## 2022-02-25 DIAGNOSIS — E78.2 MIXED HYPERLIPIDEMIA: ICD-10-CM

## 2022-02-25 RX ORDER — METOPROLOL TARTRATE 50 MG
50 TABLET ORAL 2 TIMES DAILY
Qty: 60 TABLET | Refills: 0 | Status: SHIPPED | OUTPATIENT
Start: 2022-02-25 | End: 2022-03-21

## 2022-02-25 RX ORDER — LOVASTATIN 20 MG
TABLET ORAL
Qty: 90 TABLET | Refills: 0 | Status: SHIPPED | OUTPATIENT
Start: 2022-02-25 | End: 2022-05-18

## 2022-02-25 NOTE — TELEPHONE ENCOUNTER
Metoprolol      Last Written Prescription Date:  1/24/22  Last Fill Quantity: 60,   # refills: 0  Last Office Visit: 2/7/22  Future Office visit:       Routing refill request to provider for review/approval because:

## 2022-02-25 NOTE — TELEPHONE ENCOUNTER
Lovastatin      Last Written Prescription Date:  11/24/21  Last Fill Quantity: 90,   # refills: 0  Last Office Visit: 2/7/22  Future Office visit:       Routing refill request to provider for review/approval because:

## 2022-02-28 NOTE — TELEPHONE ENCOUNTER
Attempt # 1  Outcome: Left Message          Comment: Left a voicemail to call back and schedule a physical wfasting labs with PCP

## 2022-03-03 NOTE — TELEPHONE ENCOUNTER
Attempt # 2  Outcome: Left Message          Comment: Left a voicemail to call back and schedule a physical wfasting labs with PCP

## 2022-03-07 NOTE — TELEPHONE ENCOUNTER
Attempt # 3  Outcome: Letter sent - max attempts reached   Comment: Left a voicemail to call back and schedule a visit with PCP

## 2022-03-21 DIAGNOSIS — I10 ESSENTIAL HYPERTENSION: ICD-10-CM

## 2022-03-21 DIAGNOSIS — I10 BENIGN ESSENTIAL HYPERTENSION: ICD-10-CM

## 2022-03-21 DIAGNOSIS — I25.810 CORONARY ARTERY DISEASE INVOLVING CORONARY BYPASS GRAFT OF NATIVE HEART WITHOUT ANGINA PECTORIS: ICD-10-CM

## 2022-03-21 RX ORDER — AMLODIPINE BESYLATE 10 MG/1
TABLET ORAL
Qty: 90 TABLET | Refills: 3 | Status: SHIPPED | OUTPATIENT
Start: 2022-03-21 | End: 2023-04-03

## 2022-03-21 RX ORDER — METOPROLOL TARTRATE 50 MG
TABLET ORAL
Qty: 180 TABLET | Refills: 3 | Status: SHIPPED | OUTPATIENT
Start: 2022-03-21 | End: 2023-04-25

## 2022-03-21 RX ORDER — CLOPIDOGREL BISULFATE 75 MG/1
TABLET ORAL
Qty: 90 TABLET | Refills: 3 | Status: SHIPPED | OUTPATIENT
Start: 2022-03-21 | End: 2023-03-21

## 2022-04-12 NOTE — PROGRESS NOTES
Assessment & Plan     Mixed hyperlipidemia  Update and follow.  Doing well.    - Comprehensive metabolic panel; Future  - Lipid Profile; Future  - Comprehensive metabolic panel  - Lipid Profile    Need for hepatitis C screening test  Update.   - Hepatitis C Screen Reflex to HCV RNA Quant and Genotype; Future  - Hepatitis C Screen Reflex to HCV RNA Quant and Genotype    COPD (chronic obstructive pulmonary disease) (H)  Stable.  Her sats are 89 on recheck.  She uses oxygen at night.  She declines further workup/tx for this now.        Discussion:  Nice review.  She tends to want minimal activity in this medical world.  I told her I respect that.  I need to see her annually for meds and labs and that's about it.  I will continue to offer maintenance as well.               No follow-ups on file.    David Ruiz MD  [facility]   Sheila is a 76 year old who presents for the following health issues     HPI     Hyperlipidemia Follow-Up      Are you regularly taking any medication or supplement to lower your cholesterol?   Yes- lovastatin     Are you having muscle aches or other side effects that you think could be caused by your cholesterol lowering medication?  No    Hypertension Follow-up      Do you check your blood pressure regularly outside of the clinic? Yes     Are you following a low salt diet? Yes    Are your blood pressures ever more than 140 on the top number (systolic) OR more   than 90 on the bottom number (diastolic), for example 140/90? Yes          Review of Systems   Constitutional, HEENT, cardiovascular, pulmonary, gi and gu systems are negative, except as otherwise noted.      Objective    There were no vitals taken for this visit.  There is no height or weight on file to calculate BMI.  Physical Exam   GENERAL: healthy, alert and no distress  NECK: no adenopathy, no asymmetry, masses, or scars and thyroid normal to palpation  RESP: lungs clear to  auscultation - no rales, rhonchi or wheezes  CV: regular rate and rhythm, normal S1 S2, no S3 or S4, no murmur, click or rub, no peripheral edema and peripheral pulses strong  ABDOMEN: soft, nontender, no hepatosplenomegaly, no masses and bowel sounds normal  MS: no gross musculoskeletal defects noted, no edema    Labs pending.

## 2022-04-19 ENCOUNTER — OFFICE VISIT (OUTPATIENT)
Dept: FAMILY MEDICINE | Facility: OTHER | Age: 77
End: 2022-04-19
Attending: FAMILY MEDICINE
Payer: COMMERCIAL

## 2022-04-19 VITALS
SYSTOLIC BLOOD PRESSURE: 128 MMHG | HEART RATE: 67 BPM | HEIGHT: 62 IN | DIASTOLIC BLOOD PRESSURE: 80 MMHG | OXYGEN SATURATION: 87 % | WEIGHT: 96.2 LBS | BODY MASS INDEX: 17.7 KG/M2 | TEMPERATURE: 97.4 F

## 2022-04-19 DIAGNOSIS — J43.1 PANLOBULAR EMPHYSEMA (H): ICD-10-CM

## 2022-04-19 DIAGNOSIS — E78.2 MIXED HYPERLIPIDEMIA: Primary | ICD-10-CM

## 2022-04-19 DIAGNOSIS — Z11.59 NEED FOR HEPATITIS C SCREENING TEST: ICD-10-CM

## 2022-04-19 LAB
ALBUMIN SERPL-MCNC: 3.8 G/DL (ref 3.4–5)
ALP SERPL-CCNC: 132 U/L (ref 40–150)
ALT SERPL W P-5'-P-CCNC: 13 U/L (ref 0–50)
ANION GAP SERPL CALCULATED.3IONS-SCNC: 7 MMOL/L (ref 3–14)
AST SERPL W P-5'-P-CCNC: 18 U/L (ref 0–45)
BILIRUB SERPL-MCNC: 0.5 MG/DL (ref 0.2–1.3)
BUN SERPL-MCNC: 13 MG/DL (ref 7–30)
CALCIUM SERPL-MCNC: 9.2 MG/DL (ref 8.5–10.1)
CHLORIDE BLD-SCNC: 103 MMOL/L (ref 94–109)
CHOLEST SERPL-MCNC: 186 MG/DL
CO2 SERPL-SCNC: 27 MMOL/L (ref 20–32)
CREAT SERPL-MCNC: 0.53 MG/DL (ref 0.52–1.04)
FASTING STATUS PATIENT QL REPORTED: NO
GFR SERPL CREATININE-BSD FRML MDRD: >90 ML/MIN/1.73M2
GLUCOSE BLD-MCNC: 92 MG/DL (ref 70–99)
HDLC SERPL-MCNC: 63 MG/DL
LDLC SERPL CALC-MCNC: 102 MG/DL
NONHDLC SERPL-MCNC: 123 MG/DL
POTASSIUM BLD-SCNC: 3.6 MMOL/L (ref 3.4–5.3)
PROT SERPL-MCNC: 8.4 G/DL (ref 6.8–8.8)
SODIUM SERPL-SCNC: 137 MMOL/L (ref 133–144)
TRIGL SERPL-MCNC: 105 MG/DL

## 2022-04-19 PROCEDURE — 86803 HEPATITIS C AB TEST: CPT | Performed by: FAMILY MEDICINE

## 2022-04-19 PROCEDURE — 80053 COMPREHEN METABOLIC PANEL: CPT | Performed by: FAMILY MEDICINE

## 2022-04-19 PROCEDURE — 36415 COLL VENOUS BLD VENIPUNCTURE: CPT | Performed by: FAMILY MEDICINE

## 2022-04-19 PROCEDURE — 99214 OFFICE O/P EST MOD 30 MIN: CPT | Performed by: FAMILY MEDICINE

## 2022-04-19 PROCEDURE — 80061 LIPID PANEL: CPT | Performed by: FAMILY MEDICINE

## 2022-04-19 ASSESSMENT — PAIN SCALES - GENERAL: PAINLEVEL: NO PAIN (0)

## 2022-04-19 ASSESSMENT — ANXIETY QUESTIONNAIRES
7. FEELING AFRAID AS IF SOMETHING AWFUL MIGHT HAPPEN: NOT AT ALL
3. WORRYING TOO MUCH ABOUT DIFFERENT THINGS: NOT AT ALL
5. BEING SO RESTLESS THAT IT IS HARD TO SIT STILL: NOT AT ALL
4. TROUBLE RELAXING: NOT AT ALL
6. BECOMING EASILY ANNOYED OR IRRITABLE: NOT AT ALL
2. NOT BEING ABLE TO STOP OR CONTROL WORRYING: NOT AT ALL
GAD7 TOTAL SCORE: 0
1. FEELING NERVOUS, ANXIOUS, OR ON EDGE: NOT AT ALL

## 2022-04-19 ASSESSMENT — PATIENT HEALTH QUESTIONNAIRE - PHQ9: SUM OF ALL RESPONSES TO PHQ QUESTIONS 1-9: 0

## 2022-04-19 NOTE — NURSING NOTE
"Chief Complaint   Patient presents with     Recheck Medication       Initial /80 (BP Location: Right arm, Patient Position: Sitting, Cuff Size: Adult Regular)   Pulse 67   Temp 97.4  F (36.3  C) (Tympanic)   Ht 1.575 m (5' 2\")   Wt 43.6 kg (96 lb 3.2 oz)   SpO2 (!) 87%   BMI 17.60 kg/m   Estimated body mass index is 17.6 kg/m  as calculated from the following:    Height as of this encounter: 1.575 m (5' 2\").    Weight as of this encounter: 43.6 kg (96 lb 3.2 oz).  Medication Reconciliation: complete  Sherrell Albarran LPN  "

## 2022-04-19 NOTE — LETTER
April 25, 2022      Sheila Nuñez  [address]   [facility] [address]        Dear ,    We are writing to inform you of your test results.    Your test results fall within the expected range(s) or remain unchanged from previous results.  Please continue with current treatment plan.    Resulted Orders   Comprehensive metabolic panel   Result Value Ref Range    Sodium 137 133 - 144 mmol/L    Potassium 3.6 3.4 - 5.3 mmol/L    Chloride 103 94 - 109 mmol/L    Carbon Dioxide (CO2) 27 20 - 32 mmol/L    Anion Gap 7 3 - 14 mmol/L    Urea Nitrogen 13 7 - 30 mg/dL    Creatinine 0.53 0.52 - 1.04 mg/dL    Calcium 9.2 8.5 - 10.1 mg/dL    Glucose 92 70 - 99 mg/dL    Alkaline Phosphatase 132 40 - 150 U/L    AST 18 0 - 45 U/L    ALT 13 0 - 50 U/L    Protein Total 8.4 6.8 - 8.8 g/dL    Albumin 3.8 3.4 - 5.0 g/dL    Bilirubin Total 0.5 0.2 - 1.3 mg/dL    GFR Estimate >90 >60 mL/min/1.73m2      Comment:      Effective December 21, 2021 eGFRcr in adults is calculated using the 2021 CKD-EPI creatinine equation which includes age and gender (Elvis et al., NEJ, DOI: 10.1056/AJIKxv7217109)   Lipid Profile   Result Value Ref Range    Cholesterol 186 <200 mg/dL    Triglycerides 105 <150 mg/dL    Direct Measure HDL 63 >=50 mg/dL    LDL Cholesterol Calculated 102 (H) <=100 mg/dL    Non HDL Cholesterol 123 <130 mg/dL    Patient Fasting > 8hrs? No       Comment:      Sugar in coffee  Sugar in coffee  Sugar in coffee  Sugar in coffee  Sugar in coffee  Sugar in coffee    Narrative    Cholesterol  Desirable:  <200 mg/dL    Triglycerides  Normal:  Less than 150 mg/dL  Borderline High:  150-199 mg/dL  High:  200-499 mg/dL  Very High:  Greater than or equal to 500 mg/dL    Direct Measure HDL  Female:  Greater than or equal to 50 mg/dL   Male:  Greater than or equal to 40 mg/dL    LDL Cholesterol  Desirable:  <100mg/dL  Above Desirable:  100-129 mg/dL   Borderline High:  130-159 mg/dL   High:  160-189 mg/dL   Very High:  >= 190  mg/dL    Non HDL Cholesterol  Desirable:  130 mg/dL  Above Desirable:  130-159 mg/dL  Borderline High:  160-189 mg/dL  High:  190-219 mg/dL  Very High:  Greater than or equal to 220 mg/dL   Hepatitis C Screen Reflex to HCV RNA Quant and Genotype   Result Value Ref Range    Hepatitis C Antibody Nonreactive Nonreactive    Narrative    Assay performance characteristics have not been established for newborns, infants, and children.       If you have any questions or concerns, please call the clinic at the number listed above.       Sincerely,      David Ruiz MD

## 2022-04-20 LAB — HCV AB SERPL QL IA: NONREACTIVE

## 2022-04-20 ASSESSMENT — ANXIETY QUESTIONNAIRES: GAD7 TOTAL SCORE: 0

## 2022-04-25 ENCOUNTER — TELEPHONE (OUTPATIENT)
Dept: FAMILY MEDICINE | Facility: OTHER | Age: 77
End: 2022-04-25
Payer: COMMERCIAL

## 2022-04-25 NOTE — TELEPHONE ENCOUNTER
10:34 AM    Reason for Call: Phone Call    Description: Sheila would like a z pack for a sinus infection . Please call Sheila to advise.     Was an appointment offered for this call? No  If yes : Appointment type              Date    Preferred method for responding to this message: Telephone Call  What is your phone number ?   [phone]    If we cannot reach you directly, may we leave a detailed response at the number you provided? Yes    Can this message wait until your PCP/provider returns, if available today?  Not applicable    Olga Lidia Kelly

## 2022-05-17 DIAGNOSIS — E78.2 MIXED HYPERLIPIDEMIA: ICD-10-CM

## 2022-05-18 RX ORDER — LOVASTATIN 20 MG
TABLET ORAL
Qty: 30 TABLET | Refills: 5 | Status: SHIPPED | OUTPATIENT
Start: 2022-05-18 | End: 2022-10-12

## 2022-06-02 NOTE — TELEPHONE ENCOUNTER
[facility]  Charlie Mora PA-C  [facility] [place] [address]  [phone]      Chief Complaint:  Patient is a 75y old  Female who presents with a chief complaint of Anemia r/o bleed, ADAMS (2022 09:09)      HPI: A very pleasant and well oriented 76 y/o female w/ PMHx of DM2, aortic stenosis s/p bioAVR, complete heart block s/p pacemaker, Chronic HFpEF, afib on eliquis, HTN, HLD, hypothyroidism, GABRIELA, renal cell carcinoma s/p resection, presents to ED after having two falls and one episode of vomiting earlier today. Patient was recently admitted at [facility] for chronic diastolic heart failure exacerbation for which she was diuresed and discharged but was readmitted a few days later with an ADAMS 2/2 to overdiuresis. Two days after that discharge patient states she began to feel SOB when she would exert herself but also began to develop dizziness and weakness, however patient desired to just follow up with PCP and handle things on her own. This AM, however, patient got up from bed quickly to answer the door and fell on coccyx, denies head strike or LOC. Patient then had another fall later in the day while she was going to the bathroom where she felt extremely nausous and fell forward onto her left hand. Patient again denies any headstrike or loss of consciousness. Patient also had one episode of non-bloody vomiting today. Patient was seen and examined in the ED, currently denies any nausea/vomitng, CP/pressure, SOB, headache/dizziness, myalgia, abdominal pain.     Allergies:  sulfa drugs (Short breath)      Medications:  acetaminophen     Tablet .. 650 milliGRAM(s) Oral every 6 hours PRN  aMIOdarone    Tablet 200 milliGRAM(s) Oral daily  dextrose 5%. 1000 milliLiter(s) IV Continuous <Continuous>  dextrose 5%. 1000 milliLiter(s) IV Continuous <Continuous>  dextrose 50% Injectable 25 Gram(s) IV Push once  dextrose 50% Injectable 12.5 Gram(s) IV Push once  dextrose 50% Injectable 25 Gram(s) IV Push once  dextrose Oral Gel 15 Gram(s) Oral once PRN  ferrous    sulfate 325 milliGRAM(s) Oral daily  glucagon  Injectable 1 milliGRAM(s) IntraMuscular once  insulin lispro (ADMELOG) corrective regimen sliding scale   SubCutaneous every 6 hours  insulin lispro (ADMELOG) corrective regimen sliding scale   SubCutaneous at bedtime  levothyroxine 112 MICROGram(s) Oral daily  ondansetron Injectable 4 milliGRAM(s) IV Push every 8 hours PRN  pantoprazole  Injectable 40 milliGRAM(s) IV Push every 12 hours  potassium chloride    Tablet ER 10 milliEquivalent(s) Oral daily  simvastatin 20 milliGRAM(s) Oral at bedtime      PMHX/PSHX:  Hypertension    Dyslipidemia    Prediabetes    Hypothyroid    Hiatal hernia    Type II diabetes mellitus    Heart murmur    Aortic stenosis    Cancer of kidney, left    Right bundle branch block (RBBB)    Anemia    Hypothyroidism    Status post unilateral salpingo-oophorectomy    S/P T&amp;A (status post tonsillectomy and adenoidectomy)    H/O hand surgery    H/O partial nephrectomy    H/O aortic valve replacement    Elective surgery        Family history:  No pertinent family history in first degree relatives    FH: renal cell carcinoma (Child)        Social History:     ROS:     General:  No wt loss, fevers, chills, night sweats, fatigue,   Eyes:  Good vision, no reported pain  ENT:  No sore throat, pain, runny nose, dysphagia  CV:  No pain, palpitations, hypo/hypertension  Resp:  No dyspnea, cough, tachypnea, wheezing  GI:  No pain, No nausea, No vomiting, No diarrhea, No constipation, No weight loss, No fever, No pruritis, No rectal bleeding, No tarry stools, No dysphagia,  :  No pain, bleeding, incontinence, nocturia  Muscle:  No pain, weakness  Neuro:  No weakness, tingling, memory problems  Psych:  No fatigue, insomnia, mood problems, depression  Endocrine:  No polyuria, polydipsia, cold/heat intolerance  Heme:  No petechiae, ecchymosis, easy bruisability  Skin:  No rash, tattoos, scars, edema      PHYSICAL EXAM:   Vital Signs:  Vital Signs Last 24 Hrs  T(C): 36.7 (2022 07:05), Max: 37.1 (2022 22:49)  T(F): 98 (2022 07:05), Max: 98.7 (2022 22:49)  HR: 68 (2022 07:05) (67 - 77)  BP: 113/68 (2022 07:05) (92/48 - 135/58)  BP(mean): --  RR: 18 (2022 07:05) (18 - 24)  SpO2: 98% (2022 07:05) (95% - 100%)  Daily Height in cm: 162.56 (2022 15:19)    Daily Weight in k.9 (2022 05:30)    GENERAL:  Appears stated age,   HEENT:  NC/AT,    CHEST:  Full & symmetric excursion,   HEART:  Regular rhythm  ABDOMEN:  Soft, non-tender, non-distended,   EXTEREMITIES:  no cyanosis,clubbing or edema  SKIN:  No rash  NEURO:  Alert,    LABS:                        8.2    8.86  )-----------( 236      ( 2022 10:16 )             24.4     06-02    138  |  105  |  67<H>  ----------------------------<  124<H>  4.6   |  23  |  1.60<H>    Ca    9.5      2022 03:50  Phos  4.1     06-02  Mg     2.2     06-02    TPro  6.4  /  Alb  3.0<L>  /  TBili  0.7  /  DBili  x   /  AST  14<L>  /  ALT  26  /  AlkPhos  40  06-01    LIVER FUNCTIONS - ( 2022 16:17 )  Alb: 3.0 g/dL / Pro: 6.4 g/dL / ALK PHOS: 40 U/L / ALT: 26 U/L / AST: 14 U/L / GGT: x                   Imaging:           Plavix      Last Written Prescription Date:  1/18/2018  Last Fill Quantity: 90,   # refills: 0  Last Office Visit: 9/27/2017  Future Office visit:

## 2022-07-18 ENCOUNTER — OFFICE VISIT (OUTPATIENT)
Dept: OTOLARYNGOLOGY | Facility: OTHER | Age: 77
End: 2022-07-18
Attending: NURSE PRACTITIONER
Payer: COMMERCIAL

## 2022-07-18 VITALS
SYSTOLIC BLOOD PRESSURE: 132 MMHG | HEART RATE: 69 BPM | TEMPERATURE: 97.3 F | OXYGEN SATURATION: 88 % | BODY MASS INDEX: 17.66 KG/M2 | DIASTOLIC BLOOD PRESSURE: 66 MMHG | HEIGHT: 62 IN | RESPIRATION RATE: 16 BRPM | WEIGHT: 96 LBS

## 2022-07-18 DIAGNOSIS — Z96.22 S/P MYRINGOTOMY WITH INSERTION OF TUBE: Primary | ICD-10-CM

## 2022-07-18 DIAGNOSIS — J43.1 PANLOBULAR EMPHYSEMA (H): ICD-10-CM

## 2022-07-18 DIAGNOSIS — K11.8 MASS OF LEFT PAROTID GLAND: ICD-10-CM

## 2022-07-18 PROCEDURE — 92504 EAR MICROSCOPY EXAMINATION: CPT | Performed by: NURSE PRACTITIONER

## 2022-07-18 PROCEDURE — 99213 OFFICE O/P EST LOW 20 MIN: CPT | Mod: 25 | Performed by: NURSE PRACTITIONER

## 2022-07-18 ASSESSMENT — PAIN SCALES - GENERAL: PAINLEVEL: NO PAIN (0)

## 2022-07-18 NOTE — LETTER
7/18/2022         RE: Sebastian Webster  [address]  [facility] [address]        Dear Colleague,    Thank you for referring your patient, Sebastian Webster, to the [facility]. Please see a copy of my visit note below.    Otolaryngology Note         Chief Complaint:     Patient presents with:  RECHECK: Ear check            History of Present Illness:     Sebastian Webster is a 76 year old female seen today for left tube check.      No otalgia or otorrhea.    No concerns for hearing problems or speech delay  No recent OM, abx use, otics    She is not interested in ENT referral for removal of left sided parotid mass.  The mass has been present for over 4 years.  She reports minor fluctuation in size but this is not associated with oral intake.  No concerns for facial numbness or tingling weakness.  Any discomfort of the face.    History of tobacco abuse, quit recently.  She has a history of COPD.  CT neck dated 1/29/2020 shows a left superficial parotid mass 1.6 diameters.  FNA was completed on 4/30/2020 and negative.  Repeat CT soft tissue neck was ordered in November 2021 by Dr. Gomes.  Sebastian has declined any further imaging of the mass, she reports she is not interested in any surgical interventions and she has not noted any increase in size or symptoms.  She is likely not a good surgical candidate due to severe COPD.    Patient Name: SEBASTIAN WEBSTER   MR#: [id number]   Specimen #: [id number]   Collected: 4/30/2020   Received: 4/30/2020   Reported: 5/1/2020 12:35   Ordering Phy(s): KIM GOMES   Additional Phy(s): SAMSON WICK     For improved result formatting, select 'View Enhanced Report Format' under    Linked Documents section.     SPECIMEN/STAIN PROCESS:   FNA-left parotid mass.        Pap-Cyto x 9     ----------------------------------------------------------------     CYTOLOGIC INTERPRETATION:      FNA-left parotid mass.:   Negative for malignancy    Specimen Adequacy: Satisfactory for evaluation.            Medications:     Current Outpatient Rx   Medication Sig Dispense Refill     amLODIPine (NORVASC) 10 MG tablet Take 1 tablet by mouth once daily 90 tablet 3     clopidogrel (PLAVIX) 75 MG tablet Take 1 tablet by mouth once daily 90 tablet 3     Ipratropium-Albuterol (COMBIVENT RESPIMAT)  MCG/ACT inhaler Inhale 1 puff into the lungs 4 times daily Not to exceed 6 doses per day. 3 Inhaler 3     lovastatin (MEVACOR) 20 MG tablet TAKE 1 TABLET BY MOUTH ONCE DAILY WITH EVENING MEAL 30 tablet 5     metoprolol tartrate (LOPRESSOR) 50 MG tablet Take 1 tablet by mouth twice daily 180 tablet 3            Allergies:     Allergies: Ciprofloxacin, Influenza vaccines, Morphine, Nitrofurantoin, and Nitrofurantoin macrocrystal          Past Medical History:     Past Medical History:   Diagnosis Date     Asthma 1/3/2012     CHD (coronary heart disease) 2011     Dyslipidemia 10/06/2011     Tobacco abuse 2011            Past Surgical History:     Past Surgical History:   Procedure Laterality Date     ANGIOGRAM       PHACOEMULSIFICATION WITH STANDARD INTRAOCULAR LENS IMPLANT Left 2021    Procedure: Phacoemulsification of cataract with intraocular lens placement, left;  Surgeon: Fracisco Yarbrough MD;  Location: [facility]     PHACOEMULSIFICATION WITH STANDARD INTRAOCULAR LENS IMPLANT Right 2021    Procedure: CATARACT EXTRACTION WITH INTRA OCULAR LENS RIGHT EYE;  Surgeon: Fracisco Yarbrough MD;  Location: [facility] OR     stents              Social History:     Social History     Tobacco Use     Smoking status: Light Tobacco Smoker     Packs/day: 0.10     Years: 20.00     Pack years: 2.00     Types: Cigarettes     Last attempt to quit: 2015     Years since quittin.8     Smokeless tobacco: Never Used     Tobacco comment: does not want quit plan. working on quitting now.   Substance Use Topics     Alcohol use: No     Alcohol/week: 0.0 standard drinks     Drug  "use: No            Review of Systems:     ROS: See HPI         Physical Exam:     /66 (BP Location: Right arm, Cuff Size: Adult Regular)   Pulse 69   Temp 97.3  F (36.3  C) (Tympanic)   Resp 16   Ht 1.575 m (5' 2\")   Wt 43.5 kg (96 lb)   SpO2 (!) 88%   BMI 17.56 kg/m      General - The patient is well nourished and well developed, and appears to have good nutritional status.  Alert and oriented to person and place, answers questions and cooperates with examination appropriately.   Head and Face - Normocephalic and atraumatic, with no gross asymmetry noted.  The facial nerve is intact, with strong symmetric movements.  Voice and Breathing - The patient was breathing comfortably without the use of accessory muscles. There was no wheezing, stridor. The patients voice was clear and strong, and had appropriate pitch and quality.  Ears - External ear normal.  The ears are examined under binocular microscopy and with otoscope.  Left canal is patent, left tympanic membrane has patent appearing T-tube.  No effusion or retraction noted.  The right canal is patent, right TM appears intact without effusion or retraction.  Eyes - Extraocular movements intact, sclera were not icteric or injected, conjunctiva were pink and moist.  Mouth - Examination of the oral cavity showed pink, healthy oral mucosa. Dentition in good condition. No lesions or ulcerations noted. The tongue was mobile and midline.   Throat - The walls of the oropharynx were smooth, pink, moist, symmetric, and had no lesions or ulcerations.  The tonsillar pillars and soft palate were symmetric. The uvula was midline on elevation.    Neck - Normal midline excursion of the laryngotracheal complex during swallowing.  Full range of motion on passive movement.  Palpation of the occipital, submental, submandibular, internal jugular chain, and supraclavicular nodes did not demonstrate any abnormal lymph nodes or masses.  Palpation of the thyroid was soft and " smooth, with no nodules or goiter appreciated.  The trachea was mobile and midline.  Nose - External contour is symmetric, no gross deflection or scars.  Nasal mucosa is pink and moist with no abnormal mucus.           Assessment and Plan:       ICD-10-CM    1. S/P myringotomy with insertion of tube  Z96.22    2. Mass of left parotid gland  K11.8    3. COPD (chronic obstructive pulmonary disease) (H)  J43.1      Monitor for any changes in the left parotid mass, new or worsening symptoms  Follow-up for tube check in 6 months, sooner as needed for new or worsening symptoms    Qi ABREU  [facility]          Again, thank you for allowing me to participate in the care of your patient.        Sincerely,        Qi Jefferson NP

## 2022-07-18 NOTE — PATIENT INSTRUCTIONS
Thank you for allowing Qi Jefferson and our ENT team to participate in your care.  If your medications are too expensive, please give the nurse a call.  We can possibly change this medication.  If you have a scheduling or an appointment question please contact our Health Unit Coordinator at their direct line [phone] ext [phone].   ALL nursing questions or concerns can be directed to your ENT nurse at: [phone] Jessi Billingsley     Will discuss CT neck with Dr Benton   Follow up in 6 months for tube check

## 2022-07-18 NOTE — PROGRESS NOTES
Otolaryngology Note         Chief Complaint:     Patient presents with:  RECHECK: Ear check            History of Present Illness:     Sebastian Webster is a 76 year old female seen today for left tube check.      No otalgia or otorrhea.    No concerns for hearing problems or speech delay  No recent OM, abx use, otics    She is not interested in ENT referral for removal of left sided parotid mass.  The mass has been present for over 4 years.  She reports minor fluctuation in size but this is not associated with oral intake.  No concerns for facial numbness or tingling weakness.  Any discomfort of the face.    History of tobacco abuse, quit recently.  She has a history of COPD.  CT neck dated 1/29/2020 shows a left superficial parotid mass 1.6 diameters.  FNA was completed on 4/30/2020 and negative.  Repeat CT soft tissue neck was ordered in November 2021 by Dr. Gomes.  Sebastian has declined any further imaging of the mass, she reports she is not interested in any surgical interventions and she has not noted any increase in size or symptoms.  She is likely not a good surgical candidate due to severe COPD.    Patient Name: SEBASTIAN WEBSTER   MR#: [id number]   Specimen #: [id number]   Collected: 4/30/2020   Received: 4/30/2020   Reported: 5/1/2020 12:35   Ordering Phy(s): KIM GOMES   Additional Phy(s): SAMSON WICK     For improved result formatting, select 'View Enhanced Report Format' under    Linked Documents section.     SPECIMEN/STAIN PROCESS:   FNA-left parotid mass.        Pap-Cyto x 9     ----------------------------------------------------------------     CYTOLOGIC INTERPRETATION:      FNA-left parotid mass.:   Negative for malignancy   Specimen Adequacy: Satisfactory for evaluation.            Medications:     Current Outpatient Rx   Medication Sig Dispense Refill     amLODIPine (NORVASC) 10 MG tablet Take 1 tablet by mouth once daily 90 tablet 3     clopidogrel (PLAVIX) 75 MG tablet Take 1  "tablet by mouth once daily 90 tablet 3     Ipratropium-Albuterol (COMBIVENT RESPIMAT)  MCG/ACT inhaler Inhale 1 puff into the lungs 4 times daily Not to exceed 6 doses per day. 3 Inhaler 3     lovastatin (MEVACOR) 20 MG tablet TAKE 1 TABLET BY MOUTH ONCE DAILY WITH EVENING MEAL 30 tablet 5     metoprolol tartrate (LOPRESSOR) 50 MG tablet Take 1 tablet by mouth twice daily 180 tablet 3            Allergies:     Allergies: Ciprofloxacin, Influenza vaccines, Morphine, Nitrofurantoin, and Nitrofurantoin macrocrystal          Past Medical History:     Past Medical History:   Diagnosis Date     Asthma 1/3/2012     CHD (coronary heart disease) 2011     Dyslipidemia 10/06/2011     Tobacco abuse 2011            Past Surgical History:     Past Surgical History:   Procedure Laterality Date     ANGIOGRAM       PHACOEMULSIFICATION WITH STANDARD INTRAOCULAR LENS IMPLANT Left 2021    Procedure: Phacoemulsification of cataract with intraocular lens placement, left;  Surgeon: Fracisco Yarbrough MD;  Location: [facility]     PHACOEMULSIFICATION WITH STANDARD INTRAOCULAR LENS IMPLANT Right 2021    Procedure: CATARACT EXTRACTION WITH INTRA OCULAR LENS RIGHT EYE;  Surgeon: Fracisco Yarbrough MD;  Location: [facility] OR     stents              Social History:     Social History     Tobacco Use     Smoking status: Light Tobacco Smoker     Packs/day: 0.10     Years: 20.00     Pack years: 2.00     Types: Cigarettes     Last attempt to quit: 2015     Years since quittin.8     Smokeless tobacco: Never Used     Tobacco comment: does not want quit plan. working on quitting now.   Substance Use Topics     Alcohol use: No     Alcohol/week: 0.0 standard drinks     Drug use: No            Review of Systems:     ROS: See HPI         Physical Exam:     /66 (BP Location: Right arm, Cuff Size: Adult Regular)   Pulse 69   Temp 97.3  F (36.3  C) (Tympanic)   Resp 16   Ht 1.575 m (5' 2\")   Wt 43.5 kg (96 lb)   SpO2 (!) 88% "   BMI 17.56 kg/m      General - The patient is well nourished and well developed, and appears to have good nutritional status.  Alert and oriented to person and place, answers questions and cooperates with examination appropriately.   Head and Face - Normocephalic and atraumatic, with no gross asymmetry noted.  The facial nerve is intact, with strong symmetric movements.  Voice and Breathing - The patient was breathing comfortably without the use of accessory muscles. There was no wheezing, stridor. The patients voice was clear and strong, and had appropriate pitch and quality.  Ears - External ear normal.  The ears are examined under binocular microscopy and with otoscope.  Left canal is patent, left tympanic membrane has patent appearing T-tube.  No effusion or retraction noted.  The right canal is patent, right TM appears intact without effusion or retraction.  Eyes - Extraocular movements intact, sclera were not icteric or injected, conjunctiva were pink and moist.  Mouth - Examination of the oral cavity showed pink, healthy oral mucosa. Dentition in good condition. No lesions or ulcerations noted. The tongue was mobile and midline.   Throat - The walls of the oropharynx were smooth, pink, moist, symmetric, and had no lesions or ulcerations.  The tonsillar pillars and soft palate were symmetric. The uvula was midline on elevation.    Neck - Normal midline excursion of the laryngotracheal complex during swallowing.  Full range of motion on passive movement.  Palpation of the occipital, submental, submandibular, internal jugular chain, and supraclavicular nodes did not demonstrate any abnormal lymph nodes or masses.  Palpation of the thyroid was soft and smooth, with no nodules or goiter appreciated.  The trachea was mobile and midline.  Nose - External contour is symmetric, no gross deflection or scars.  Nasal mucosa is pink and moist with no abnormal mucus.           Assessment and Plan:       ICD-10-CM    1.  S/P myringotomy with insertion of tube  Z96.22    2. Mass of left parotid gland  K11.8    3. COPD (chronic obstructive pulmonary disease) (H)  J43.1      Monitor for any changes in the left parotid mass, new or worsening symptoms  Follow-up for tube check in 6 months, sooner as needed for new or worsening symptoms    Qi ABREU  [facility]

## 2022-07-18 NOTE — NURSING NOTE
"Chief Complaint   Patient presents with     RECHECK     Ear check        Initial /66 (BP Location: Right arm, Cuff Size: Adult Regular)   Pulse 69   Temp 97.3  F (36.3  C) (Tympanic)   Resp 16   Ht 1.575 m (5' 2\")   Wt 43.5 kg (96 lb)   SpO2 (!) 88%   BMI 17.56 kg/m   Estimated body mass index is 17.56 kg/m  as calculated from the following:    Height as of this encounter: 1.575 m (5' 2\").    Weight as of this encounter: 43.5 kg (96 lb).  Medication Reconciliation: complete  Carolina Vargas LPN  "

## 2022-10-12 DIAGNOSIS — E78.2 MIXED HYPERLIPIDEMIA: ICD-10-CM

## 2022-10-13 RX ORDER — LOVASTATIN 20 MG
TABLET ORAL
Qty: 30 TABLET | Refills: 0 | Status: SHIPPED | OUTPATIENT
Start: 2022-10-13 | End: 2023-01-04

## 2023-01-02 DIAGNOSIS — E78.2 MIXED HYPERLIPIDEMIA: ICD-10-CM

## 2023-01-04 RX ORDER — LOVASTATIN 20 MG
TABLET ORAL
Qty: 90 TABLET | Refills: 0 | Status: SHIPPED | OUTPATIENT
Start: 2023-01-04 | End: 2023-04-03

## 2023-03-18 DIAGNOSIS — I25.810 CORONARY ARTERY DISEASE INVOLVING CORONARY BYPASS GRAFT OF NATIVE HEART WITHOUT ANGINA PECTORIS: ICD-10-CM

## 2023-03-21 RX ORDER — CLOPIDOGREL BISULFATE 75 MG/1
TABLET ORAL
Qty: 90 TABLET | Refills: 0 | Status: SHIPPED | OUTPATIENT
Start: 2023-03-21 | End: 2023-04-25

## 2023-03-30 DIAGNOSIS — I10 ESSENTIAL HYPERTENSION: ICD-10-CM

## 2023-04-01 DIAGNOSIS — E78.2 MIXED HYPERLIPIDEMIA: ICD-10-CM

## 2023-04-03 RX ORDER — AMLODIPINE BESYLATE 10 MG/1
TABLET ORAL
Qty: 90 TABLET | Refills: 0 | Status: SHIPPED | OUTPATIENT
Start: 2023-04-03 | End: 2023-06-26

## 2023-04-03 RX ORDER — LOVASTATIN 20 MG
TABLET ORAL
Qty: 90 TABLET | Refills: 0 | Status: SHIPPED | OUTPATIENT
Start: 2023-04-03 | End: 2023-06-26

## 2023-04-04 DIAGNOSIS — J43.1 PANLOBULAR EMPHYSEMA (H): Primary | ICD-10-CM

## 2023-04-25 DIAGNOSIS — I10 BENIGN ESSENTIAL HYPERTENSION: ICD-10-CM

## 2023-04-25 DIAGNOSIS — I25.810 CORONARY ARTERY DISEASE INVOLVING CORONARY BYPASS GRAFT OF NATIVE HEART WITHOUT ANGINA PECTORIS: ICD-10-CM

## 2023-04-25 RX ORDER — CLOPIDOGREL BISULFATE 75 MG/1
TABLET ORAL
Qty: 90 TABLET | Refills: 0 | Status: SHIPPED | OUTPATIENT
Start: 2023-04-25 | End: 2023-06-16

## 2023-04-25 RX ORDER — METOPROLOL TARTRATE 50 MG
TABLET ORAL
Qty: 60 TABLET | Refills: 0 | Status: SHIPPED | OUTPATIENT
Start: 2023-04-25 | End: 2023-05-31

## 2023-06-16 DIAGNOSIS — I25.810 CORONARY ARTERY DISEASE INVOLVING CORONARY BYPASS GRAFT OF NATIVE HEART WITHOUT ANGINA PECTORIS: ICD-10-CM

## 2023-06-16 RX ORDER — CLOPIDOGREL BISULFATE 75 MG/1
TABLET ORAL
Qty: 90 TABLET | Refills: 0 | Status: SHIPPED | OUTPATIENT
Start: 2023-06-16 | End: 2023-12-28

## 2023-06-16 NOTE — TELEPHONE ENCOUNTER
Plavix       Last Written Prescription Date:  4/25/2023  Last Fill Quantity: 90,   # refills: 0  Last Office Visit: 4/19/2022  Future Office visit:    Next 5 appointments (look out 90 days)    Jun 29, 2023  8:45 AM  (Arrive by 8:30 AM)  [facility] with David Ruiz MD  [facility] [address] [facility] [address]  [phone]

## 2023-06-21 NOTE — PROGRESS NOTES
Assessment & Plan     Mixed hyperlipidemia  Stable.  Update labs.  No side effects.    - Comprehensive metabolic panel; Future  - Lipid Profile; Future  - lovastatin (MEVACOR) 20 MG tablet; TAKE 1 TABLET BY MOUTH ONCE DAILY WITH EVENING MEAL    COPD (chronic obstructive pulmonary disease) (H)  Stable.  Her oxygen is 84 today on room air.  She uses oxygen at home when needed.  She feels well other than some rhinorrhea which we sent a zpack in case it turns into the sinuses but overall no change and she is quite adamant she is fine and nothing is needed.    - ipratropium-albuterol (COMBIVENT RESPIMAT)  MCG/ACT inhaler; Inhale 1 puff into the lungs 4 times daily Not to exceed 6 doses per day.    Benign essential hypertension  Stable.  Update.    - metoprolol tartrate (LOPRESSOR) 50 MG tablet; Take 1 tablet (50 mg) by mouth 2 times daily    CHD (coronary heart disease)  No sx.  She understands to report any concerns in this area.  Continue ont he plavix as well.                 Nicotine/Tobacco Cessation:  She reports that she has been smoking cigarettes. She has a 2.00 pack-year smoking history. She has never used smokeless tobacco.  Nicotine/Tobacco Cessation Plan:   Information offered: Patient not interested at this time          No follow-ups on file.    David Ruiz MD  [facility]    Jose Roberto Sosa is a 77 year old, presenting for the following health issues:  Lipids and Hypertension         No data to display              HPI     Hyperlipidemia Follow-Up      Are you regularly taking any medication or supplement to lower your cholesterol?   Yes- lovastatin     Are you having muscle aches or other side effects that you think could be caused by your cholesterol lowering medication?  No    Hypertension Follow-up      Do you check your blood pressure regularly outside of the clinic? Yes     Are you following a low salt diet? No    Are your blood pressures ever more than 140  on the top number (systolic) OR more   than 90 on the bottom number (diastolic), for example 140/90? No            Review of Systems   Constitutional, HEENT, cardiovascular, pulmonary, gi and gu systems are negative, except as otherwise noted.      Objective    There were no vitals taken for this visit.  There is no height or weight on file to calculate BMI.  Physical Exam   GENERAL: healthy, alert and no distress  NECK: no adenopathy, no asymmetry, masses, or scars and thyroid normal to palpation  RESP: lungs clear to auscultation - no rales, rhonchi or wheezes  RESP: lungs clear to auscultation - no rales, rhonchi or wheezes and decreased breath sounds throughout  CV: regular rate and rhythm, normal S1 S2, no S3 or S4, no murmur, click or rub, no peripheral edema and peripheral pulses strong  ABDOMEN: soft, nontender, no hepatosplenomegaly, no masses and bowel sounds normal  MS: no gross musculoskeletal defects noted, no edema    Annual labs pending.      Politely declines any screenings, etc.  Nice review with her.  She is well informed and understands and just feels it isn't needed.

## 2023-06-22 DIAGNOSIS — I10 ESSENTIAL HYPERTENSION: ICD-10-CM

## 2023-06-26 DIAGNOSIS — E78.2 MIXED HYPERLIPIDEMIA: ICD-10-CM

## 2023-06-26 RX ORDER — LOVASTATIN 20 MG
TABLET ORAL
Qty: 90 TABLET | Refills: 0 | Status: SHIPPED | OUTPATIENT
Start: 2023-06-26 | End: 2023-06-29

## 2023-06-27 RX ORDER — AMLODIPINE BESYLATE 10 MG/1
10 TABLET ORAL DAILY
Qty: 90 TABLET | Refills: 3 | Status: SHIPPED | OUTPATIENT
Start: 2023-06-27 | End: 2024-06-26

## 2023-06-29 ENCOUNTER — OFFICE VISIT (OUTPATIENT)
Dept: FAMILY MEDICINE | Facility: OTHER | Age: 78
End: 2023-06-29
Attending: FAMILY MEDICINE
Payer: COMMERCIAL

## 2023-06-29 VITALS
BODY MASS INDEX: 17.38 KG/M2 | DIASTOLIC BLOOD PRESSURE: 60 MMHG | TEMPERATURE: 97 F | HEART RATE: 62 BPM | SYSTOLIC BLOOD PRESSURE: 132 MMHG | WEIGHT: 95 LBS | OXYGEN SATURATION: 84 %

## 2023-06-29 DIAGNOSIS — J43.1 PANLOBULAR EMPHYSEMA (H): ICD-10-CM

## 2023-06-29 DIAGNOSIS — I25.10 CORONARY ARTERY DISEASE INVOLVING NATIVE CORONARY ARTERY OF NATIVE HEART WITHOUT ANGINA PECTORIS: ICD-10-CM

## 2023-06-29 DIAGNOSIS — E78.2 MIXED HYPERLIPIDEMIA: Primary | ICD-10-CM

## 2023-06-29 DIAGNOSIS — I10 BENIGN ESSENTIAL HYPERTENSION: ICD-10-CM

## 2023-06-29 LAB
ALBUMIN SERPL BCG-MCNC: 3.9 G/DL (ref 3.5–5.2)
ALP SERPL-CCNC: 131 U/L (ref 35–104)
ALT SERPL W P-5'-P-CCNC: 7 U/L (ref 0–50)
ANION GAP SERPL CALCULATED.3IONS-SCNC: 14 MMOL/L (ref 7–15)
AST SERPL W P-5'-P-CCNC: 16 U/L (ref 0–45)
BILIRUB SERPL-MCNC: 0.4 MG/DL
BUN SERPL-MCNC: 9.8 MG/DL (ref 8–23)
CALCIUM SERPL-MCNC: 9.8 MG/DL (ref 8.8–10.2)
CHLORIDE SERPL-SCNC: 92 MMOL/L (ref 98–107)
CHOLEST SERPL-MCNC: 167 MG/DL
CREAT SERPL-MCNC: 0.82 MG/DL (ref 0.51–0.95)
DEPRECATED HCO3 PLAS-SCNC: 28 MMOL/L (ref 22–29)
GFR SERPL CREATININE-BSD FRML MDRD: 73 ML/MIN/1.73M2
GLUCOSE SERPL-MCNC: 93 MG/DL (ref 70–99)
HDLC SERPL-MCNC: 50 MG/DL
LDLC SERPL CALC-MCNC: 94 MG/DL
NONHDLC SERPL-MCNC: 117 MG/DL
POTASSIUM SERPL-SCNC: 3.7 MMOL/L (ref 3.4–5.3)
PROT SERPL-MCNC: 7.5 G/DL (ref 6.4–8.3)
SODIUM SERPL-SCNC: 134 MMOL/L (ref 136–145)
TRIGL SERPL-MCNC: 116 MG/DL

## 2023-06-29 PROCEDURE — 36415 COLL VENOUS BLD VENIPUNCTURE: CPT | Mod: ZL | Performed by: FAMILY MEDICINE

## 2023-06-29 PROCEDURE — 99214 OFFICE O/P EST MOD 30 MIN: CPT | Performed by: FAMILY MEDICINE

## 2023-06-29 PROCEDURE — G0463 HOSPITAL OUTPT CLINIC VISIT: HCPCS | Performed by: FAMILY MEDICINE

## 2023-06-29 PROCEDURE — 80053 COMPREHEN METABOLIC PANEL: CPT | Mod: ZL | Performed by: FAMILY MEDICINE

## 2023-06-29 PROCEDURE — 80061 LIPID PANEL: CPT | Mod: ZL | Performed by: FAMILY MEDICINE

## 2023-06-29 RX ORDER — IPRATROPIUM BROMIDE AND ALBUTEROL 20; 100 UG/1; UG/1
1 SPRAY, METERED RESPIRATORY (INHALATION) 4 TIMES DAILY
Qty: 3 EACH | Refills: 3 | Status: SHIPPED | OUTPATIENT
Start: 2023-06-29

## 2023-06-29 RX ORDER — AZITHROMYCIN 250 MG/1
TABLET, FILM COATED ORAL
Qty: 6 TABLET | Refills: 0 | Status: SHIPPED | OUTPATIENT
Start: 2023-06-29 | End: 2023-07-04

## 2023-06-29 RX ORDER — METOPROLOL TARTRATE 50 MG
50 TABLET ORAL 2 TIMES DAILY
Qty: 60 TABLET | Refills: 3 | Status: SHIPPED | OUTPATIENT
Start: 2023-06-29 | End: 2023-12-20

## 2023-06-29 RX ORDER — LOVASTATIN 20 MG
TABLET ORAL
Qty: 90 TABLET | Refills: 3 | Status: SHIPPED | OUTPATIENT
Start: 2023-06-29 | End: 2024-09-16

## 2023-07-06 ENCOUNTER — TELEPHONE (OUTPATIENT)
Dept: FAMILY MEDICINE | Facility: OTHER | Age: 78
End: 2023-07-06

## 2023-07-06 NOTE — TELEPHONE ENCOUNTER
Insurance does cover medication but the leftover amount is $300. Patient would need alternate medication at this time.

## 2023-07-06 NOTE — TELEPHONE ENCOUNTER
9:54 AM    Reason for Call: Phone Call    Description: pt was using combivent, her insurance does not cover it/she can't afford it.  Is there another inhaler she can get?  Can you send a prior auth for it?    Was an appointment offered for this call? No  If yes : Appointment type              Date    Preferred method for responding to this message: Telephone Call  What is your phone number ?[phone]    If we cannot reach you directly, may we leave a detailed response at the number you provided? Yes    Can this message wait until your PCP/provider returns, if available today? Sussy Jaramillo

## 2023-07-06 NOTE — TELEPHONE ENCOUNTER
This should be sent to prior auth so an alternative inhaler that her insurance does cover can be ordered. Please send to prior auth.

## 2023-07-07 NOTE — TELEPHONE ENCOUNTER
Notified patient of notes below. Encouraged patient to call insurance to see what medications may be covered at a more affordable price and to call us back with what they suggest.

## 2023-09-12 ENCOUNTER — TELEPHONE (OUTPATIENT)
Dept: FAMILY MEDICINE | Facility: OTHER | Age: 78
End: 2023-09-12

## 2023-09-12 NOTE — TELEPHONE ENCOUNTER
Notified pt that Dr Ruiz is out of the office this week.  Offered appt with another provide and pt declined.

## 2023-09-12 NOTE — TELEPHONE ENCOUNTER
10:21 AM    Reason for Call: Phone Call    Description: Patient calling wanting to speak to Dr Ruiz nurse regarding her sinus infection and she wants something to make the pressure go away.     Was an appointment offered for this call? No, patient didn't want to see anyone she wants to speak to Dr Ruiz nurse    Preferred method for responding to this message: Telephone Call  What is your phone number ? [phone]    If we cannot reach you directly, may we leave a detailed response at the number you provided? Yes    Can this message wait until your PCP/provider returns, if available today? No    Patient notified Dr Ruiz is out of the office this week but she was insistent to speak to his nurse and the care team.

## 2023-12-18 DIAGNOSIS — I10 BENIGN ESSENTIAL HYPERTENSION: ICD-10-CM

## 2023-12-18 NOTE — TELEPHONE ENCOUNTER
Disp Refills Start End KAVITHA   metoprolol tartrate (LOPRESSOR) 50 MG tablet 60 tablet 3 6/29/2023  No     Last Office Visit: 06/29/2023  Future Office visit:       Routing refill request to provider for review/approval because:

## 2023-12-20 RX ORDER — METOPROLOL TARTRATE 50 MG
50 TABLET ORAL 2 TIMES DAILY
Qty: 60 TABLET | Refills: 4 | Status: SHIPPED | OUTPATIENT
Start: 2023-12-20 | End: 2024-07-09

## 2023-12-28 DIAGNOSIS — I25.810 CORONARY ARTERY DISEASE INVOLVING CORONARY BYPASS GRAFT OF NATIVE HEART WITHOUT ANGINA PECTORIS: ICD-10-CM

## 2023-12-28 RX ORDER — CLOPIDOGREL BISULFATE 75 MG/1
TABLET ORAL
Qty: 90 TABLET | Refills: 1 | Status: SHIPPED | OUTPATIENT
Start: 2023-12-28 | End: 2024-06-26

## 2023-12-28 NOTE — TELEPHONE ENCOUNTER
clopidogrel (PLAVIX) 75 MG tablet    Last Written Prescription Date:  6-16-23  Last Fill Quantity: 90,   # refills: 0  Last Office Visit: 6-29-23  Future Office visit:       Routing refill request to provider for review/approval because:  Drug not on the [facility], [facility] or [facility] refill protocol or controlled substance

## 2023-12-29 RX ORDER — CLOPIDOGREL BISULFATE 75 MG/1
TABLET ORAL
Qty: 90 TABLET | Refills: 0 | OUTPATIENT
Start: 2023-12-29

## 2024-02-14 ENCOUNTER — DOCUMENTATION ONLY (OUTPATIENT)
Dept: FAMILY MEDICINE | Facility: OTHER | Age: 79
End: 2024-02-14

## 2024-02-14 DIAGNOSIS — J44.9 CHRONIC OBSTRUCTIVE PULMONARY DISEASE (H): Primary | ICD-10-CM

## 2024-02-14 DIAGNOSIS — J44.9 CHRONIC OBSTRUCTIVE PULMONARY DISEASE, UNSPECIFIED COPD TYPE (H): ICD-10-CM

## 2024-07-09 DIAGNOSIS — I10 BENIGN ESSENTIAL HYPERTENSION: ICD-10-CM

## 2024-07-09 RX ORDER — METOPROLOL TARTRATE 50 MG
50 TABLET ORAL 2 TIMES DAILY
Qty: 180 TABLET | Refills: 0 | Status: SHIPPED | OUTPATIENT
Start: 2024-07-09 | End: 2024-10-07

## 2024-09-15 DIAGNOSIS — E78.2 MIXED HYPERLIPIDEMIA: ICD-10-CM

## 2024-09-16 RX ORDER — LOVASTATIN 20 MG
TABLET ORAL
Qty: 90 TABLET | Refills: 3 | Status: SHIPPED | OUTPATIENT
Start: 2024-09-16

## 2024-09-20 DIAGNOSIS — I25.810 CORONARY ARTERY DISEASE INVOLVING CORONARY BYPASS GRAFT OF NATIVE HEART WITHOUT ANGINA PECTORIS: ICD-10-CM

## 2024-09-20 DIAGNOSIS — I10 ESSENTIAL HYPERTENSION: ICD-10-CM

## 2024-09-20 NOTE — TELEPHONE ENCOUNTER
Norvasc  Last Written Prescription Date: 6/26/24  Last Fill Quantity: 90 # of Refills: 0  Last Office Visit: 6/29/23    Plavix  Last Written Prescription Date: 6/26/24  Last Fill Quantity: 90 # of Refills: 0  Last Office Visit: 6/29/23

## 2024-09-23 RX ORDER — CLOPIDOGREL BISULFATE 75 MG/1
TABLET ORAL
Qty: 90 TABLET | Refills: 3 | Status: SHIPPED | OUTPATIENT
Start: 2024-09-23

## 2024-09-23 RX ORDER — AMLODIPINE BESYLATE 10 MG/1
10 TABLET ORAL DAILY
Qty: 90 TABLET | Refills: 3 | Status: SHIPPED | OUTPATIENT
Start: 2024-09-23

## 2024-09-24 NOTE — PROGRESS NOTES
Assessment & Plan     Mixed hyperlipidemia  Doing well overall.  Labs pending.  Plan to continue statin.    - Comprehensive metabolic panel; Future  - Lipid Profile; Future  - Comprehensive metabolic panel  - Lipid Profile    Asymptomatic menopausal state  Politely declines dexa.  She is a minimalist and doesn't want any other testing and would not treat even if she had it.      COPD (chronic obstructive pulmonary disease) (H)  Stable.  Advise tobacco cessation.     CHD (coronary heart disease)  Stable.  No new sx.            Nicotine/Tobacco Cessation  She reports that she has been smoking cigarettes. She started smoking about 29 years ago. She has a 2 pack-year smoking history. She has never used smokeless tobacco.  Nicotine/Tobacco Cessation Plan  Information offered: Patient not interested at this time            No follow-ups on file.    Jose Roberto Sosa is a 79 year old, presenting for the following health issues:  Hypertension and Lipids        10/7/2024    11:00 AM   Additional Questions   Roomed by Ruth   Accompanied by self     HPI     Hyperlipidemia Follow-Up    Are you regularly taking any medication or supplement to lower your cholesterol?   Yes- lovastatin   Are you having muscle aches or other side effects that you think could be caused by your cholesterol lowering medication?  No    Hypertension Follow-up    Do you check your blood pressure regularly outside of the clinic? No   Are you following a low salt diet? No  Are your blood pressures ever more than 140 on the top number (systolic) OR more   than 90 on the bottom number (diastolic), for example 140/90? NA    Medication Followup of combivent   Taking Medication as prescribed: NO-too expensive   Side Effects:  None  Medication Helping Symptoms:  not applicable      Review of Systems  Constitutional, HEENT, cardiovascular, pulmonary, gi and gu systems are negative, except as otherwise noted.      Objective    /58   Pulse 82   Temp  96.9  F (36.1  C) (Tympanic)   Wt 41.3 kg (91 lb)   SpO2 (!) 77%   BMI 16.64 kg/m    Body mass index is 16.64 kg/m .  Physical Exam   GENERAL: alert and no distress  NECK: no adenopathy, no asymmetry, masses, or scars  RESP: lungs clear to auscultation - no rales, rhonchi or wheezes  CV: regular rate and rhythm, normal S1 S2, no S3 or S4, no murmur, click or rub, no peripheral edema  ABDOMEN: soft, nontender, no hepatosplenomegaly, no masses and bowel sounds normal  MS: no gross musculoskeletal defects noted, no edema    Full labs pending.      The longitudinal plan of care for the diagnosis(es)/condition(s) as documented were addressed during this visit. Due to the added complexity in care, I will continue to support Sheila in the subsequent management and with ongoing continuity of care.        Signed Electronically by: David Ruiz MD

## 2024-10-05 DIAGNOSIS — I10 BENIGN ESSENTIAL HYPERTENSION: ICD-10-CM

## 2024-10-07 ENCOUNTER — OFFICE VISIT (OUTPATIENT)
Dept: FAMILY MEDICINE | Facility: OTHER | Age: 79
End: 2024-10-07
Attending: FAMILY MEDICINE
Payer: COMMERCIAL

## 2024-10-07 VITALS
BODY MASS INDEX: 16.64 KG/M2 | HEART RATE: 82 BPM | WEIGHT: 91 LBS | DIASTOLIC BLOOD PRESSURE: 58 MMHG | SYSTOLIC BLOOD PRESSURE: 134 MMHG | TEMPERATURE: 96.9 F | OXYGEN SATURATION: 77 %

## 2024-10-07 DIAGNOSIS — Z78.0 ASYMPTOMATIC MENOPAUSAL STATE: ICD-10-CM

## 2024-10-07 DIAGNOSIS — E78.2 MIXED HYPERLIPIDEMIA: ICD-10-CM

## 2024-10-07 DIAGNOSIS — J43.1 PANLOBULAR EMPHYSEMA (H): ICD-10-CM

## 2024-10-07 DIAGNOSIS — I25.10 CORONARY ARTERY DISEASE INVOLVING NATIVE CORONARY ARTERY OF NATIVE HEART WITHOUT ANGINA PECTORIS: Primary | ICD-10-CM

## 2024-10-07 LAB
ALBUMIN SERPL BCG-MCNC: 4.1 G/DL (ref 3.5–5.2)
ALP SERPL-CCNC: 157 U/L (ref 40–150)
ALT SERPL W P-5'-P-CCNC: 6 U/L (ref 0–50)
ANION GAP SERPL CALCULATED.3IONS-SCNC: 14 MMOL/L (ref 7–15)
AST SERPL W P-5'-P-CCNC: 15 U/L (ref 0–45)
BILIRUB SERPL-MCNC: 0.5 MG/DL
BUN SERPL-MCNC: 11.3 MG/DL (ref 8–23)
CALCIUM SERPL-MCNC: 9.3 MG/DL (ref 8.8–10.4)
CHLORIDE SERPL-SCNC: 96 MMOL/L (ref 98–107)
CHOLEST SERPL-MCNC: 172 MG/DL
CREAT SERPL-MCNC: 0.76 MG/DL (ref 0.51–0.95)
EGFRCR SERPLBLD CKD-EPI 2021: 79 ML/MIN/1.73M2
FASTING STATUS PATIENT QL REPORTED: YES
FASTING STATUS PATIENT QL REPORTED: YES
GLUCOSE SERPL-MCNC: 124 MG/DL (ref 70–99)
HCO3 SERPL-SCNC: 25 MMOL/L (ref 22–29)
HDLC SERPL-MCNC: 56 MG/DL
LDLC SERPL CALC-MCNC: 84 MG/DL
NONHDLC SERPL-MCNC: 116 MG/DL
POTASSIUM SERPL-SCNC: 3.6 MMOL/L (ref 3.4–5.3)
PROT SERPL-MCNC: 7.9 G/DL (ref 6.4–8.3)
SODIUM SERPL-SCNC: 135 MMOL/L (ref 135–145)
TRIGL SERPL-MCNC: 162 MG/DL

## 2024-10-07 PROCEDURE — 82947 ASSAY GLUCOSE BLOOD QUANT: CPT | Mod: ZL | Performed by: FAMILY MEDICINE

## 2024-10-07 PROCEDURE — 99214 OFFICE O/P EST MOD 30 MIN: CPT | Performed by: FAMILY MEDICINE

## 2024-10-07 PROCEDURE — 82465 ASSAY BLD/SERUM CHOLESTEROL: CPT | Mod: ZL | Performed by: FAMILY MEDICINE

## 2024-10-07 PROCEDURE — 84155 ASSAY OF PROTEIN SERUM: CPT | Mod: ZL | Performed by: FAMILY MEDICINE

## 2024-10-07 PROCEDURE — G0463 HOSPITAL OUTPT CLINIC VISIT: HCPCS

## 2024-10-07 PROCEDURE — G2211 COMPLEX E/M VISIT ADD ON: HCPCS | Performed by: FAMILY MEDICINE

## 2024-10-07 PROCEDURE — 36415 COLL VENOUS BLD VENIPUNCTURE: CPT | Mod: ZL | Performed by: FAMILY MEDICINE

## 2024-10-07 RX ORDER — METOPROLOL TARTRATE 50 MG
50 TABLET ORAL 2 TIMES DAILY
Qty: 180 TABLET | Refills: 3 | Status: SHIPPED | OUTPATIENT
Start: 2024-10-07

## 2024-10-07 ASSESSMENT — PAIN SCALES - GENERAL: PAINLEVEL: NO PAIN (0)

## 2024-11-07 ENCOUNTER — TELEPHONE (OUTPATIENT)
Dept: FAMILY MEDICINE | Facility: OTHER | Age: 79
End: 2024-11-07

## 2024-11-07 NOTE — TELEPHONE ENCOUNTER
9:52 AM    Reason for Call: OVERBOOK    Patient is having the following symptoms: anxiety     The patient is requesting an appointment for nov with Dr Ruiz.    Was an appointment offered for this call? No  If yes : Appointment type              Date    Preferred method for responding to this message: Telephone Call  What is your phone number ?[phone]     If we cannot reach you directly, may we leave a detailed response at the number you provided? Yes    Can this message wait until your PCP/provider returns, if unavailable today? Not applicable    Antoinette Jaramillo

## 2024-11-07 NOTE — TELEPHONE ENCOUNTER
Joanne wanted Dr. Ruiz to know that she is super anxious today and is wondering if he would call something in for her. I told her  he most likely would not, that she could go to urgent care if necessary . She took the appointment for the 12th  but wanted me to send a message

## 2024-11-07 NOTE — PROGRESS NOTES
Assessment & Plan     Anxiety  Really nice review of options with daughter present as well.  Discouraged but talked about benzos.  Discouraged but talked about hydroxyzine.  Trial of trazodone at night only sent.  We stressed deep breathing techniques to try and activate her parasympathetic nervous system and I explained how that all works.  Daughter really on board with this and we are trying to avoid cns active meds due to age.  Report ongoing concerns and sx.  Basically, try the breathing and try the pill and see where this goes.  Talked selective serotonin reuptake inhibitor which she wasn't really wanting to do that much for now.    - traZODone (DESYREL) 50 MG tablet; Take 1 tablet (50 mg) by mouth at bedtime.    COPD (chronic obstructive pulmonary disease) (H)  Better oxygen with the increase.  Helps the breathing but despite this the panic feeling has persisted.  Overall, plan as above.  The focus on breathing 4x/day is essentially prescribed today to her in an effort to avoid pills overall.          MED REC REQUIRED  Post Medication Reconciliation Status:         No follow-ups on file.    Jose Roberto Sosa is a 79 year old, presenting for the following health issues:  ER F/U    HPI     ED/UC Followup:    Facility:  [facility]  Date of visit: 11/8/24  Reason for visit: COPD exacerbation   Current Status: anxiety is the same, SOB is better on 3 liters oxygen              Review of Systems  Constitutional, HEENT, cardiovascular, pulmonary, gi and gu systems are negative, except as otherwise noted.      Objective    /52   Pulse 69   Temp 96.9  F (36.1  C) (Tympanic)   Wt 42.2 kg (93 lb)   SpO2 (!) 89%   BMI 17.01 kg/m    Body mass index is 17.01 kg/m .  Physical Exam   GENERAL: alert and no distress  NECK: no adenopathy, no asymmetry, masses, or scars  RESP: lungs clear to auscultation - no rales, rhonchi or wheezes and decreased breath sounds throughout  CV: regular rate and rhythm, normal S1 S2, no  S3 or S4, no murmur, click or rub, no peripheral edema  ABDOMEN: soft, nontender, no hepatosplenomegaly, no masses and bowel sounds normal  MS: no gross musculoskeletal defects noted, no edema    Full workup reviewed through ER.  Labs stable.  Cxr stable with basilar markings consistent all the way back to 2020.      The longitudinal plan of care for the diagnosis(es)/condition(s) as documented were addressed during this visit. Due to the added complexity in care, I will continue to support Sheila in the subsequent management and with ongoing continuity of care.        Signed Electronically by: David Ruiz MD

## 2024-11-08 ENCOUNTER — HOSPITAL ENCOUNTER (EMERGENCY)
Facility: HOSPITAL | Age: 79
Discharge: HOME | End: 2024-11-08
Attending: INTERNAL MEDICINE
Payer: COMMERCIAL

## 2024-11-08 ENCOUNTER — APPOINTMENT (OUTPATIENT)
Dept: GENERAL RADIOLOGY | Facility: HOSPITAL | Age: 79
End: 2024-11-08
Attending: EMERGENCY MEDICINE
Payer: COMMERCIAL

## 2024-11-08 VITALS
TEMPERATURE: 97.9 F | HEART RATE: 85 BPM | SYSTOLIC BLOOD PRESSURE: 125 MMHG | OXYGEN SATURATION: 90 % | WEIGHT: 93 LBS | BODY MASS INDEX: 17.01 KG/M2 | DIASTOLIC BLOOD PRESSURE: 81 MMHG | RESPIRATION RATE: 20 BRPM

## 2024-11-08 DIAGNOSIS — J44.1 COPD EXACERBATION (H): ICD-10-CM

## 2024-11-08 LAB
ANION GAP SERPL CALCULATED.3IONS-SCNC: 10 MMOL/L (ref 7–15)
BASE EXCESS BLDV CALC-SCNC: 6 MMOL/L (ref -3–3)
BASOPHILS # BLD AUTO: 0.1 10E3/UL (ref 0–0.2)
BASOPHILS NFR BLD AUTO: 1 %
BUN SERPL-MCNC: 12.4 MG/DL (ref 8–23)
CALCIUM SERPL-MCNC: 9.2 MG/DL (ref 8.8–10.4)
CHLORIDE SERPL-SCNC: 95 MMOL/L (ref 98–107)
CREAT SERPL-MCNC: 0.79 MG/DL (ref 0.51–0.95)
EGFRCR SERPLBLD CKD-EPI 2021: 76 ML/MIN/1.73M2
EOSINOPHIL # BLD AUTO: 0.2 10E3/UL (ref 0–0.7)
EOSINOPHIL NFR BLD AUTO: 2 %
ERYTHROCYTE [DISTWIDTH] IN BLOOD BY AUTOMATED COUNT: 13.3 % (ref 10–15)
FLUAV RNA SPEC QL NAA+PROBE: NEGATIVE
FLUBV RNA RESP QL NAA+PROBE: NEGATIVE
GLUCOSE SERPL-MCNC: 106 MG/DL (ref 70–99)
HCO3 BLDV-SCNC: 33 MMOL/L (ref 21–28)
HCO3 SERPL-SCNC: 29 MMOL/L (ref 22–29)
HCT VFR BLD AUTO: 37.8 % (ref 35–47)
HGB BLD-MCNC: 12.5 G/DL (ref 11.7–15.7)
IMM GRANULOCYTES # BLD: 0 10E3/UL
IMM GRANULOCYTES NFR BLD: 0 %
LYMPHOCYTES # BLD AUTO: 2.1 10E3/UL (ref 0.8–5.3)
LYMPHOCYTES NFR BLD AUTO: 24 %
MCH RBC QN AUTO: 28.8 PG (ref 26.5–33)
MCHC RBC AUTO-ENTMCNC: 33.1 G/DL (ref 31.5–36.5)
MCV RBC AUTO: 87 FL (ref 78–100)
MONOCYTES # BLD AUTO: 0.9 10E3/UL (ref 0–1.3)
MONOCYTES NFR BLD AUTO: 10 %
NEUTROPHILS # BLD AUTO: 5.7 10E3/UL (ref 1.6–8.3)
NEUTROPHILS NFR BLD AUTO: 63 %
NRBC # BLD AUTO: 0 10E3/UL
NRBC BLD AUTO-RTO: 0 /100
NT-PROBNP SERPL-MCNC: 543 PG/ML (ref 0–1800)
O2/TOTAL GAS SETTING VFR VENT: 36 %
OXYHGB MFR BLDV: 78 % (ref 70–75)
PCO2 BLDV: 56 MM HG (ref 40–50)
PH BLDV: 7.38 [PH] (ref 7.32–7.43)
PLATELET # BLD AUTO: 219 10E3/UL (ref 150–450)
PO2 BLDV: 46 MM HG (ref 25–47)
POTASSIUM SERPL-SCNC: 4.1 MMOL/L (ref 3.4–5.3)
RBC # BLD AUTO: 4.34 10E6/UL (ref 3.8–5.2)
RSV RNA SPEC NAA+PROBE: NEGATIVE
SAO2 % BLDV: 80.4 % (ref 70–75)
SARS-COV-2 RNA RESP QL NAA+PROBE: NEGATIVE
SODIUM SERPL-SCNC: 134 MMOL/L (ref 135–145)
TROPONIN T SERPL HS-MCNC: <6 NG/L
WBC # BLD AUTO: 9 10E3/UL (ref 4–11)

## 2024-11-08 PROCEDURE — 99285 EMERGENCY DEPT VISIT HI MDM: CPT | Mod: 25

## 2024-11-08 PROCEDURE — 36415 COLL VENOUS BLD VENIPUNCTURE: CPT | Performed by: EMERGENCY MEDICINE

## 2024-11-08 PROCEDURE — 99284 EMERGENCY DEPT VISIT MOD MDM: CPT | Performed by: INTERNAL MEDICINE

## 2024-11-08 PROCEDURE — 93010 ELECTROCARDIOGRAM REPORT: CPT | Performed by: INTERNAL MEDICINE

## 2024-11-08 PROCEDURE — 250N000011 HC RX IP 250 OP 636: Performed by: EMERGENCY MEDICINE

## 2024-11-08 PROCEDURE — 83880 ASSAY OF NATRIURETIC PEPTIDE: CPT | Performed by: EMERGENCY MEDICINE

## 2024-11-08 PROCEDURE — 250N000013 HC RX MED GY IP 250 OP 250 PS 637: Performed by: INTERNAL MEDICINE

## 2024-11-08 PROCEDURE — 71046 X-RAY EXAM CHEST 2 VIEWS: CPT

## 2024-11-08 PROCEDURE — 250N000009 HC RX 250: Performed by: EMERGENCY MEDICINE

## 2024-11-08 PROCEDURE — 85004 AUTOMATED DIFF WBC COUNT: CPT | Performed by: EMERGENCY MEDICINE

## 2024-11-08 PROCEDURE — 93005 ELECTROCARDIOGRAM TRACING: CPT

## 2024-11-08 PROCEDURE — 96374 THER/PROPH/DIAG INJ IV PUSH: CPT

## 2024-11-08 PROCEDURE — 250N000009 HC RX 250: Performed by: INTERNAL MEDICINE

## 2024-11-08 PROCEDURE — 80048 BASIC METABOLIC PNL TOTAL CA: CPT | Performed by: EMERGENCY MEDICINE

## 2024-11-08 PROCEDURE — 999N000157 HC STATISTIC RCP TIME EA 10 MIN

## 2024-11-08 PROCEDURE — 84484 ASSAY OF TROPONIN QUANT: CPT | Performed by: EMERGENCY MEDICINE

## 2024-11-08 PROCEDURE — 82805 BLOOD GASES W/O2 SATURATION: CPT | Performed by: EMERGENCY MEDICINE

## 2024-11-08 PROCEDURE — 94640 AIRWAY INHALATION TREATMENT: CPT

## 2024-11-08 PROCEDURE — 87637 SARSCOV2&INF A&B&RSV AMP PRB: CPT | Performed by: EMERGENCY MEDICINE

## 2024-11-08 RX ORDER — IPRATROPIUM BROMIDE AND ALBUTEROL SULFATE 2.5; .5 MG/3ML; MG/3ML
3 SOLUTION RESPIRATORY (INHALATION) ONCE
Status: COMPLETED | OUTPATIENT
Start: 2024-11-08 | End: 2024-11-08

## 2024-11-08 RX ORDER — METHYLPREDNISOLONE SODIUM SUCCINATE 125 MG/2ML
125 INJECTION INTRAMUSCULAR; INTRAVENOUS ONCE
Status: DISCONTINUED | OUTPATIENT
Start: 2024-11-08 | End: 2024-11-08

## 2024-11-08 RX ORDER — PREDNISONE 20 MG/1
TABLET ORAL
Qty: 5 TABLET | Refills: 0 | Status: SHIPPED | OUTPATIENT
Start: 2024-11-08 | End: 2024-11-15

## 2024-11-08 RX ORDER — METHYLPREDNISOLONE SODIUM SUCCINATE 125 MG/2ML
125 INJECTION INTRAMUSCULAR; INTRAVENOUS ONCE
Status: COMPLETED | OUTPATIENT
Start: 2024-11-08 | End: 2024-11-08

## 2024-11-08 RX ORDER — AZITHROMYCIN 250 MG/1
500 TABLET, FILM COATED ORAL ONCE
Status: COMPLETED | OUTPATIENT
Start: 2024-11-08 | End: 2024-11-08

## 2024-11-08 RX ORDER — AZITHROMYCIN 250 MG/1
TABLET, FILM COATED ORAL
Qty: 6 TABLET | Refills: 0 | Status: SHIPPED | OUTPATIENT
Start: 2024-11-08 | End: 2024-11-12

## 2024-11-08 RX ADMIN — IPRATROPIUM BROMIDE AND ALBUTEROL SULFATE 3 ML: .5; 3 SOLUTION RESPIRATORY (INHALATION) at 18:42

## 2024-11-08 RX ADMIN — AZITHROMYCIN DIHYDRATE 500 MG: 250 TABLET ORAL at 20:57

## 2024-11-08 RX ADMIN — IPRATROPIUM BROMIDE AND ALBUTEROL SULFATE 3 ML: .5; 3 SOLUTION RESPIRATORY (INHALATION) at 19:52

## 2024-11-08 RX ADMIN — METHYLPREDNISOLONE SODIUM SUCCINATE 125 MG: 125 INJECTION, POWDER, FOR SOLUTION INTRAMUSCULAR; INTRAVENOUS at 19:04

## 2024-11-08 ASSESSMENT — COLUMBIA-SUICIDE SEVERITY RATING SCALE - C-SSRS
6. HAVE YOU EVER DONE ANYTHING, STARTED TO DO ANYTHING, OR PREPARED TO DO ANYTHING TO END YOUR LIFE?: NO
2. HAVE YOU ACTUALLY HAD ANY THOUGHTS OF KILLING YOURSELF IN THE PAST MONTH?: NO
1. IN THE PAST MONTH, HAVE YOU WISHED YOU WERE DEAD OR WISHED YOU COULD GO TO SLEEP AND NOT WAKE UP?: NO

## 2024-11-08 ASSESSMENT — ACTIVITIES OF DAILY LIVING (ADL)
ADLS_ACUITY_SCORE: 0

## 2024-11-08 NOTE — ED TRIAGE NOTES
Patient presents c/o anxiety and dizziness x3-4 days. Also notes increased SOB. Denies fevers, worsening cough, chills, chest abd pain. Is on O2, 2LPM NC d/t COPD.  87% on 2LPM, states she is normally around 70%s on this amount, family at bedside agrees, patient states she has never adjusted her oxygen amt. Slight increased WOB, no distress.

## 2024-11-09 NOTE — ED NOTES
Patient discharged to Home at this time. Patient given written and verbal discharge instructions regarding home care, follow-up, and medications. Patient verbalized understanding of all discharge instructions. Rest and hydration encouraged. Patient encouraged to return to the ED if they experience new, worsening, or concerning symptoms.     Patients RX for azithromycin and prednisone sent to [facility] pharmacy.    Patient to follow-up with PCP in 3 days.

## 2024-11-09 NOTE — ED NOTES
"Pt presents with her daughter to the ED today for SOB and \"anxiety.\"  Pt is chronically on 1.5 LPM O2 at home. Pt reports her sats at home have been in the 70's and 80's. Pts daughter states she has been trying to turn up pts O2 at home but the pt does not allow O2 to be turned up at she was prescribed 1.5 LPM. Pt is a smoker. Pt has congested non-productive cough.   "

## 2024-11-10 ASSESSMENT — ENCOUNTER SYMPTOMS
DIZZINESS: 0
MYALGIAS: 0
COUGH: 0
FLANK PAIN: 0
VOMITING: 0
NECK STIFFNESS: 0
CHILLS: 0
WHEEZING: 0
BACK PAIN: 0
DYSURIA: 0
PALPITATIONS: 0
ANAL BLEEDING: 0
NERVOUS/ANXIOUS: 1
NAUSEA: 0
CONFUSION: 0
BLOOD IN STOOL: 0
FEVER: 0
HEADACHES: 0
NECK PAIN: 0
ABDOMINAL PAIN: 0
CHEST TIGHTNESS: 0
DIAPHORESIS: 0
NUMBNESS: 0
ABDOMINAL DISTENTION: 0
HEMATURIA: 0
ARTHRALGIAS: 0
COLOR CHANGE: 0
VOICE CHANGE: 0
WOUND: 0
SHORTNESS OF BREATH: 1
LIGHT-HEADEDNESS: 0

## 2024-11-11 LAB
ATRIAL RATE - MUSE: 82 BPM
DIASTOLIC BLOOD PRESSURE - MUSE: NORMAL MMHG
INTERPRETATION ECG - MUSE: NORMAL
P AXIS - MUSE: 23 DEGREES
PR INTERVAL - MUSE: 98 MS
QRS DURATION - MUSE: 78 MS
QT - MUSE: 392 MS
QTC - MUSE: 457 MS
R AXIS - MUSE: 76 DEGREES
SYSTOLIC BLOOD PRESSURE - MUSE: NORMAL MMHG
T AXIS - MUSE: 65 DEGREES
VENTRICULAR RATE- MUSE: 82 BPM

## 2024-11-11 NOTE — ED PROVIDER NOTES
History     Chief Complaint   Patient presents with    Anxiety    Shortness of Breath     The history is provided by the patient.   Shortness of Breath  Severity:  Moderate  Onset quality:  Gradual  Duration:  2 days  Timing:  Constant  Chronicity:  Recurrent  Associated symptoms: no abdominal pain, no chest pain, no cough, no diaphoresis, no fever, no headaches, no neck pain, no rash, no vomiting and no wheezing      Allergies:  Allergies   Allergen Reactions    Ciprofloxacin Other (See Comments)     Cipro  intolerance    Influenza Vaccines      Influenza virus vacc. Specific      Morphine Other (See Comments)     Becomes very mean    Nitrofurantoin Other (See Comments)     Macrobid  intolerance    Nitrofurantoin Macrocrystal Other (See Comments)     Macrobid  Intolerance         Problem List:    Patient Active Problem List    Diagnosis Date Noted    Protein-calorie malnutrition (H) 11/23/2020     Priority: Medium    COPD exacerbation (H) 06/07/2017     Priority: Medium    ACP (advance care planning) 01/25/2017     Priority: Medium     Advance Care Planning 1/25/2017: ACP Review of Chart / Resources Provided:  Reviewed chart for advance care plan.  Sheila Nuñez has been provided information and resources to begin or update their advance care plan.  Added by Celia Montenegro            COPD (chronic obstructive pulmonary disease) (H) 05/28/2013     Priority: Medium    Dyslipidemia 05/28/2013     Priority: Medium    Asthma, moderate persistent 01/03/2012     Priority: Medium    CHD (coronary heart disease) 07/27/2011     Priority: Medium    Tobacco abuse 07/27/2011     Priority: Medium        Past Medical History:    Past Medical History:   Diagnosis Date    Asthma 1/3/2012    CHD (coronary heart disease) 7/27/2011    Dyslipidemia 10/06/2011    Tobacco abuse 7/27/2011       Past Surgical History:    Past Surgical History:   Procedure Laterality Date    ANGIOGRAM      PHACOEMULSIFICATION WITH STANDARD  INTRAOCULAR LENS IMPLANT Left 2021    Procedure: Phacoemulsification of cataract with intraocular lens placement, left;  Surgeon: Fracisco aYrbrough MD;  Location: [facility] OR    PHACOEMULSIFICATION WITH STANDARD INTRAOCULAR LENS IMPLANT Right 2021    Procedure: CATARACT EXTRACTION WITH INTRA OCULAR LENS RIGHT EYE;  Surgeon: Fracisco Yarbrough MD;  Location: [facility] OR    stents         Family History:    Family History   Problem Relation Age of Onset    C.A.D. Father 63        cause of death    C.A.D. Mother 78        cause of death       Social History:  Marital Status:   [2]  Social History     Tobacco Use    Smoking status: Light Smoker     Current packs/day: 0.00     Average packs/day: 0.1 packs/day for 20.0 years (2.0 ttl pk-yrs)     Types: Cigarettes     Start date: 1995     Last attempt to quit: 2015     Years since quittin.2    Smokeless tobacco: Never    Tobacco comments:     does not want quit plan. working on quitting now.   Vaping Use    Vaping status: Never Used   Substance Use Topics    Alcohol use: No     Alcohol/week: 0.0 standard drinks of alcohol    Drug use: No        Medications:    azithromycin (ZITHROMAX Z-JOSE ALFREDO) 250 MG tablet  predniSONE (DELTASONE) 20 MG tablet  amLODIPine (NORVASC) 10 MG tablet  clopidogrel (PLAVIX) 75 MG tablet  lovastatin (MEVACOR) 20 MG tablet  metoprolol tartrate (LOPRESSOR) 50 MG tablet          Review of Systems   Constitutional:  Negative for chills, diaphoresis and fever.   HENT:  Negative for voice change.    Eyes:  Negative for visual disturbance.   Respiratory:  Positive for shortness of breath. Negative for cough, chest tightness and wheezing.    Cardiovascular:  Negative for chest pain, palpitations and leg swelling.   Gastrointestinal:  Negative for abdominal distention, abdominal pain, anal bleeding, blood in stool, nausea and vomiting.   Genitourinary:  Negative for decreased urine volume, dysuria, flank pain and hematuria.   Musculoskeletal:   Negative for arthralgias, back pain, gait problem, myalgias, neck pain and neck stiffness.   Skin:  Negative for color change, pallor, rash and wound.   Neurological:  Negative for dizziness, syncope, light-headedness, numbness and headaches.   Psychiatric/Behavioral:  Negative for confusion and suicidal ideas. The patient is nervous/anxious.        Physical Exam   BP: 168/77  Pulse: 85  Temp: 97.9  F (36.6  C)  Resp: 20  Weight: 42.2 kg (93 lb)  SpO2: (!) 88 %      Physical Exam  Vitals and nursing note reviewed.   Constitutional:       Appearance: She is well-developed.   HENT:      Head: Normocephalic and atraumatic.      Mouth/Throat:      Pharynx: No oropharyngeal exudate.   Eyes:      Conjunctiva/sclera: Conjunctivae normal.      Pupils: Pupils are equal, round, and reactive to light.   Neck:      Thyroid: No thyromegaly.      Vascular: No JVD.      Trachea: No tracheal deviation.   Cardiovascular:      Rate and Rhythm: Normal rate and regular rhythm.      Heart sounds: Normal heart sounds. No murmur heard.     No friction rub. No gallop.   Pulmonary:      Effort: Pulmonary effort is normal. No respiratory distress.      Breath sounds: Normal breath sounds. No stridor. No wheezing or rales.   Chest:      Chest wall: No tenderness.   Abdominal:      General: Bowel sounds are normal. There is no distension.      Palpations: Abdomen is soft. There is no mass.      Tenderness: There is no abdominal tenderness. There is no guarding or rebound.   Musculoskeletal:         General: No tenderness. Normal range of motion.      Cervical back: Normal range of motion and neck supple.   Lymphadenopathy:      Cervical: No cervical adenopathy.   Skin:     General: Skin is warm and dry.      Coloration: Skin is not pale.      Findings: No erythema or rash.   Neurological:      Mental Status: She is alert and oriented to person, place, and time.   Psychiatric:         Behavior: Behavior normal.         ED Course         Procedures             No results found for this or any previous visit (from the past 24 hours).    Medications   ipratropium - albuterol 0.5 mg/2.5 mg/3 mL (DUONEB) neb solution 3 mL (3 mLs Nebulization $Given 11/8/24 1842)   methylPREDNISolone Na Suc (solu-MEDROL) injection 125 mg (125 mg Intravenous $Given 11/8/24 1904)   ipratropium - albuterol 0.5 mg/2.5 mg/3 mL (DUONEB) neb solution 3 mL (3 mLs Nebulization $Given 11/8/24 1952)   azithromycin (ZITHROMAX) tablet 500 mg (500 mg Oral $Given 11/8/24 2057)       Assessments & Plan (with Medical Decision Making)   Brought by daughter for low Spo2 reading at home  She is on 2 lit o2 at home but needed 3 lit/ m in ER to keep spo2 over 91  Looks comfortable , no distress    Labs reviewed  CXR: bilateral fibrosis, infiltration?    Zithro and prednisone started, I recommended follow-up with PCP for reevaluation    I have reviewed the nursing notes.    I have reviewed the findings, diagnosis, plan and need for follow up with the patient.        Discharge Medication List as of 11/8/2024  8:50 PM        START taking these medications    Details   azithromycin (ZITHROMAX Z-JOSE ALFREDO) 250 MG tablet Two tablets on the first day, then one tablet daily for the next 4 days, Disp-6 tablet, R-0, E-Prescribe      predniSONE (DELTASONE) 20 MG tablet 1 tab daily for 3 days, then 1/2 tab daily for 4 days, Disp-5 tablet, R-0, E-Prescribe             Final diagnoses:   COPD exacerbation (H)       11/8/2024   [facility]       Fer Campos MD  11/10/24 1935

## 2024-11-12 ENCOUNTER — OFFICE VISIT (OUTPATIENT)
Dept: FAMILY MEDICINE | Facility: OTHER | Age: 79
End: 2024-11-12
Attending: FAMILY MEDICINE
Payer: COMMERCIAL

## 2024-11-12 VITALS
SYSTOLIC BLOOD PRESSURE: 120 MMHG | DIASTOLIC BLOOD PRESSURE: 52 MMHG | OXYGEN SATURATION: 89 % | WEIGHT: 93 LBS | BODY MASS INDEX: 17.01 KG/M2 | HEART RATE: 69 BPM | TEMPERATURE: 96.9 F

## 2024-11-12 DIAGNOSIS — J43.1 PANLOBULAR EMPHYSEMA (H): ICD-10-CM

## 2024-11-12 DIAGNOSIS — F41.9 ANXIETY: Primary | ICD-10-CM

## 2024-11-12 PROCEDURE — G0463 HOSPITAL OUTPT CLINIC VISIT: HCPCS

## 2024-11-12 RX ORDER — TRAZODONE HYDROCHLORIDE 50 MG/1
50 TABLET, FILM COATED ORAL AT BEDTIME
Qty: 30 TABLET | Refills: 1 | Status: SHIPPED | OUTPATIENT
Start: 2024-11-12

## 2024-11-12 ASSESSMENT — ANXIETY QUESTIONNAIRES
IF YOU CHECKED OFF ANY PROBLEMS ON THIS QUESTIONNAIRE, HOW DIFFICULT HAVE THESE PROBLEMS MADE IT FOR YOU TO DO YOUR WORK, TAKE CARE OF THINGS AT HOME, OR GET ALONG WITH OTHER PEOPLE: SOMEWHAT DIFFICULT
8. IF YOU CHECKED OFF ANY PROBLEMS, HOW DIFFICULT HAVE THESE MADE IT FOR YOU TO DO YOUR WORK, TAKE CARE OF THINGS AT HOME, OR GET ALONG WITH OTHER PEOPLE?: SOMEWHAT DIFFICULT
3. WORRYING TOO MUCH ABOUT DIFFERENT THINGS: NOT AT ALL
7. FEELING AFRAID AS IF SOMETHING AWFUL MIGHT HAPPEN: SEVERAL DAYS
7. FEELING AFRAID AS IF SOMETHING AWFUL MIGHT HAPPEN: SEVERAL DAYS
4. TROUBLE RELAXING: NOT AT ALL
6. BECOMING EASILY ANNOYED OR IRRITABLE: MORE THAN HALF THE DAYS
1. FEELING NERVOUS, ANXIOUS, OR ON EDGE: SEVERAL DAYS
2. NOT BEING ABLE TO STOP OR CONTROL WORRYING: NOT AT ALL
GAD7 TOTAL SCORE: 5
GAD7 TOTAL SCORE: 5
5. BEING SO RESTLESS THAT IT IS HARD TO SIT STILL: SEVERAL DAYS
GAD7 TOTAL SCORE: 5

## 2024-11-12 ASSESSMENT — PAIN SCALES - GENERAL: PAINLEVEL_OUTOF10: NO PAIN (0)

## 2024-11-15 ENCOUNTER — HOSPITAL ENCOUNTER (EMERGENCY)
Facility: HOSPITAL | Age: 79
Discharge: HOME | End: 2024-11-16
Attending: EMERGENCY MEDICINE
Payer: COMMERCIAL

## 2024-11-15 ENCOUNTER — APPOINTMENT (OUTPATIENT)
Dept: GENERAL RADIOLOGY | Facility: HOSPITAL | Age: 79
End: 2024-11-15
Attending: EMERGENCY MEDICINE
Payer: COMMERCIAL

## 2024-11-15 DIAGNOSIS — E86.0 DEHYDRATION: ICD-10-CM

## 2024-11-15 LAB
ALBUMIN SERPL BCG-MCNC: 4.3 G/DL (ref 3.5–5.2)
ALBUMIN UR-MCNC: NEGATIVE MG/DL
ALP SERPL-CCNC: 141 U/L (ref 40–150)
ALT SERPL W P-5'-P-CCNC: 9 U/L (ref 0–50)
ANION GAP SERPL CALCULATED.3IONS-SCNC: 12 MMOL/L (ref 7–15)
APPEARANCE UR: CLEAR
AST SERPL W P-5'-P-CCNC: 18 U/L (ref 0–45)
ATRIAL RATE - MUSE: 100 BPM
BASE EXCESS BLDV CALC-SCNC: 7.2 MMOL/L (ref -3–3)
BASOPHILS # BLD AUTO: 0.1 10E3/UL (ref 0–0.2)
BASOPHILS NFR BLD AUTO: 0 %
BILIRUB SERPL-MCNC: 0.3 MG/DL
BILIRUB UR QL STRIP: NEGATIVE
BUN SERPL-MCNC: 15.1 MG/DL (ref 8–23)
CALCIUM SERPL-MCNC: 10.2 MG/DL (ref 8.8–10.4)
CHLORIDE SERPL-SCNC: 91 MMOL/L (ref 98–107)
COLOR UR AUTO: ABNORMAL
CREAT SERPL-MCNC: 0.7 MG/DL (ref 0.51–0.95)
DIASTOLIC BLOOD PRESSURE - MUSE: NORMAL MMHG
EGFRCR SERPLBLD CKD-EPI 2021: 87 ML/MIN/1.73M2
EOSINOPHIL # BLD AUTO: 0.1 10E3/UL (ref 0–0.7)
EOSINOPHIL NFR BLD AUTO: 1 %
ERYTHROCYTE [DISTWIDTH] IN BLOOD BY AUTOMATED COUNT: 13.3 % (ref 10–15)
FLUAV RNA SPEC QL NAA+PROBE: NEGATIVE
FLUBV RNA RESP QL NAA+PROBE: NEGATIVE
GLUCOSE SERPL-MCNC: 132 MG/DL (ref 70–99)
GLUCOSE UR STRIP-MCNC: NEGATIVE MG/DL
HCO3 BLDV-SCNC: 34 MMOL/L (ref 21–28)
HCO3 SERPL-SCNC: 29 MMOL/L (ref 22–29)
HCT VFR BLD AUTO: 40.8 % (ref 35–47)
HGB BLD-MCNC: 13.5 G/DL (ref 11.7–15.7)
HGB UR QL STRIP: NEGATIVE
HOLD SPECIMEN: NORMAL
HOLD SPECIMEN: NORMAL
IMM GRANULOCYTES # BLD: 0 10E3/UL
IMM GRANULOCYTES NFR BLD: 0 %
INTERPRETATION ECG - MUSE: NORMAL
KETONES UR STRIP-MCNC: NEGATIVE MG/DL
LACTATE SERPL-SCNC: 1.6 MMOL/L (ref 0.7–2)
LEUKOCYTE ESTERASE UR QL STRIP: NEGATIVE
LYMPHOCYTES # BLD AUTO: 1.9 10E3/UL (ref 0.8–5.3)
LYMPHOCYTES NFR BLD AUTO: 17 %
MCH RBC QN AUTO: 29.2 PG (ref 26.5–33)
MCHC RBC AUTO-ENTMCNC: 33.1 G/DL (ref 31.5–36.5)
MCV RBC AUTO: 88 FL (ref 78–100)
MONOCYTES # BLD AUTO: 1 10E3/UL (ref 0–1.3)
MONOCYTES NFR BLD AUTO: 9 %
MUCOUS THREADS #/AREA URNS LPF: PRESENT /LPF
NEUTROPHILS # BLD AUTO: 8.1 10E3/UL (ref 1.6–8.3)
NEUTROPHILS NFR BLD AUTO: 72 %
NITRATE UR QL: NEGATIVE
NRBC # BLD AUTO: 0 10E3/UL
NRBC BLD AUTO-RTO: 0 /100
O2/TOTAL GAS SETTING VFR VENT: 36 %
OXYHGB MFR BLDV: 82 % (ref 70–75)
P AXIS - MUSE: 77 DEGREES
PCO2 BLDV: 55 MM HG (ref 40–50)
PH BLDV: 7.4 [PH] (ref 7.32–7.43)
PH UR STRIP: 7.5 [PH] (ref 4.7–8)
PLATELET # BLD AUTO: 234 10E3/UL (ref 150–450)
PO2 BLDV: 50 MM HG (ref 25–47)
POTASSIUM SERPL-SCNC: 3.7 MMOL/L (ref 3.4–5.3)
PR INTERVAL - MUSE: 134 MS
PROCALCITONIN SERPL IA-MCNC: 0.05 NG/ML
PROT SERPL-MCNC: 8.3 G/DL (ref 6.4–8.3)
QRS DURATION - MUSE: 70 MS
QT - MUSE: 348 MS
QTC - MUSE: 448 MS
R AXIS - MUSE: 67 DEGREES
RBC # BLD AUTO: 4.63 10E6/UL (ref 3.8–5.2)
RBC URINE: <1 /HPF
RSV RNA SPEC NAA+PROBE: NEGATIVE
SAO2 % BLDV: 85.2 % (ref 70–75)
SARS-COV-2 RNA RESP QL NAA+PROBE: NEGATIVE
SODIUM SERPL-SCNC: 132 MMOL/L (ref 135–145)
SP GR UR STRIP: 1.01 (ref 1–1.03)
SQUAMOUS EPITHELIAL: 0 /HPF
SYSTOLIC BLOOD PRESSURE - MUSE: NORMAL MMHG
T AXIS - MUSE: 57 DEGREES
UROBILINOGEN UR STRIP-MCNC: NORMAL MG/DL
VENTRICULAR RATE- MUSE: 100 BPM
WBC # BLD AUTO: 11.1 10E3/UL (ref 4–11)
WBC URINE: 1 /HPF

## 2024-11-15 PROCEDURE — 84145 PROCALCITONIN (PCT): CPT | Performed by: EMERGENCY MEDICINE

## 2024-11-15 PROCEDURE — 87637 SARSCOV2&INF A&B&RSV AMP PRB: CPT | Performed by: EMERGENCY MEDICINE

## 2024-11-15 PROCEDURE — 71045 X-RAY EXAM CHEST 1 VIEW: CPT

## 2024-11-15 PROCEDURE — 258N000003 HC RX IP 258 OP 636: Performed by: EMERGENCY MEDICINE

## 2024-11-15 PROCEDURE — 250N000009 HC RX 250: Performed by: EMERGENCY MEDICINE

## 2024-11-15 PROCEDURE — 83605 ASSAY OF LACTIC ACID: CPT | Performed by: EMERGENCY MEDICINE

## 2024-11-15 PROCEDURE — 82805 BLOOD GASES W/O2 SATURATION: CPT | Performed by: EMERGENCY MEDICINE

## 2024-11-15 PROCEDURE — 80053 COMPREHEN METABOLIC PANEL: CPT | Performed by: EMERGENCY MEDICINE

## 2024-11-15 PROCEDURE — 85025 COMPLETE CBC W/AUTO DIFF WBC: CPT | Performed by: EMERGENCY MEDICINE

## 2024-11-15 PROCEDURE — 87086 URINE CULTURE/COLONY COUNT: CPT | Performed by: EMERGENCY MEDICINE

## 2024-11-15 PROCEDURE — 250N000011 HC RX IP 250 OP 636: Performed by: EMERGENCY MEDICINE

## 2024-11-15 PROCEDURE — 36415 COLL VENOUS BLD VENIPUNCTURE: CPT | Performed by: EMERGENCY MEDICINE

## 2024-11-15 PROCEDURE — 81001 URINALYSIS AUTO W/SCOPE: CPT | Performed by: EMERGENCY MEDICINE

## 2024-11-15 PROCEDURE — 94640 AIRWAY INHALATION TREATMENT: CPT

## 2024-11-15 PROCEDURE — 99285 EMERGENCY DEPT VISIT HI MDM: CPT | Mod: 25

## 2024-11-15 PROCEDURE — 96365 THER/PROPH/DIAG IV INF INIT: CPT

## 2024-11-15 PROCEDURE — 99284 EMERGENCY DEPT VISIT MOD MDM: CPT | Performed by: EMERGENCY MEDICINE

## 2024-11-15 PROCEDURE — 87040 BLOOD CULTURE FOR BACTERIA: CPT | Performed by: EMERGENCY MEDICINE

## 2024-11-15 PROCEDURE — 93010 ELECTROCARDIOGRAM REPORT: CPT | Performed by: INTERNAL MEDICINE

## 2024-11-15 RX ORDER — METHYLPREDNISOLONE SODIUM SUCCINATE 40 MG/ML
40 INJECTION INTRAMUSCULAR; INTRAVENOUS ONCE
Status: COMPLETED | OUTPATIENT
Start: 2024-11-15 | End: 2024-11-15

## 2024-11-15 RX ORDER — MAGNESIUM SULFATE HEPTAHYDRATE 40 MG/ML
2 INJECTION, SOLUTION INTRAVENOUS ONCE
Status: COMPLETED | OUTPATIENT
Start: 2024-11-15 | End: 2024-11-15

## 2024-11-15 RX ORDER — IPRATROPIUM BROMIDE AND ALBUTEROL SULFATE 2.5; .5 MG/3ML; MG/3ML
3 SOLUTION RESPIRATORY (INHALATION) ONCE
Status: COMPLETED | OUTPATIENT
Start: 2024-11-15 | End: 2024-11-15

## 2024-11-15 RX ADMIN — MAGNESIUM SULFATE HEPTAHYDRATE 2 G: 40 INJECTION, SOLUTION INTRAVENOUS at 22:35

## 2024-11-15 RX ADMIN — IPRATROPIUM BROMIDE AND ALBUTEROL SULFATE 3 ML: .5; 3 SOLUTION RESPIRATORY (INHALATION) at 22:06

## 2024-11-15 RX ADMIN — SODIUM CHLORIDE 1000 ML: 9 INJECTION, SOLUTION INTRAVENOUS at 22:30

## 2024-11-15 ASSESSMENT — COLUMBIA-SUICIDE SEVERITY RATING SCALE - C-SSRS
1. IN THE PAST MONTH, HAVE YOU WISHED YOU WERE DEAD OR WISHED YOU COULD GO TO SLEEP AND NOT WAKE UP?: NO
2. HAVE YOU ACTUALLY HAD ANY THOUGHTS OF KILLING YOURSELF IN THE PAST MONTH?: NO
6. HAVE YOU EVER DONE ANYTHING, STARTED TO DO ANYTHING, OR PREPARED TO DO ANYTHING TO END YOUR LIFE?: NO

## 2024-11-15 ASSESSMENT — ACTIVITIES OF DAILY LIVING (ADL)
ADLS_ACUITY_SCORE: 0
ADLS_ACUITY_SCORE: 0

## 2024-11-15 ASSESSMENT — ENCOUNTER SYMPTOMS
COUGH: 1
SHORTNESS OF BREATH: 1
FEVER: 0
CHILLS: 0

## 2024-11-16 VITALS
HEART RATE: 90 BPM | TEMPERATURE: 97.9 F | OXYGEN SATURATION: 95 % | BODY MASS INDEX: 17.32 KG/M2 | RESPIRATION RATE: 37 BRPM | HEIGHT: 62 IN | DIASTOLIC BLOOD PRESSURE: 63 MMHG | WEIGHT: 94.14 LBS | SYSTOLIC BLOOD PRESSURE: 122 MMHG

## 2024-11-16 NOTE — ED PROVIDER NOTES
History     Chief Complaint   Patient presents with    Shortness of Breath     HPI  Sheila Nuñez is a 79 year old female who is here with dizziness.  Recently completed antibiotics and steroids for COPD exacerbation.  Felt dizzy, normally is on 3 L due to her COPD, cranks up to 4 L did feel a lot better so came here.  Dizziness is only upon standing, is not persistent after she stands up or sits back down.    Allergies:  Allergies   Allergen Reactions    Ciprofloxacin Other (See Comments)     Cipro  intolerance    Influenza Vaccines      Influenza virus vacc. Specific      Morphine Other (See Comments)     Becomes very mean    Nitrofurantoin Other (See Comments)     Macrobid  intolerance    Nitrofurantoin Macrocrystal Other (See Comments)     Macrobid  Intolerance         Problem List:    Patient Active Problem List    Diagnosis Date Noted    Protein-calorie malnutrition (H) 11/23/2020     Priority: Medium    COPD exacerbation (H) 06/07/2017     Priority: Medium    ACP (advance care planning) 01/25/2017     Priority: Medium     Advance Care Planning 1/25/2017: ACP Review of Chart / Resources Provided:  Reviewed chart for advance care plan.  Sheila Nuñez has been provided information and resources to begin or update their advance care plan.  Added by Celia Montenegro            COPD (chronic obstructive pulmonary disease) (H) 05/28/2013     Priority: Medium    Dyslipidemia 05/28/2013     Priority: Medium    Asthma, moderate persistent 01/03/2012     Priority: Medium    CHD (coronary heart disease) 07/27/2011     Priority: Medium    Tobacco abuse 07/27/2011     Priority: Medium        Past Medical History:    Past Medical History:   Diagnosis Date    Asthma 1/3/2012    CHD (coronary heart disease) 7/27/2011    Dyslipidemia 10/06/2011    Tobacco abuse 7/27/2011       Past Surgical History:    Past Surgical History:   Procedure Laterality Date    ANGIOGRAM      PHACOEMULSIFICATION WITH STANDARD INTRAOCULAR  "LENS IMPLANT Left 2021    Procedure: Phacoemulsification of cataract with intraocular lens placement, left;  Surgeon: Fracisco Yarbrough MD;  Location: [facility]    PHACOEMULSIFICATION WITH STANDARD INTRAOCULAR LENS IMPLANT Right 2021    Procedure: CATARACT EXTRACTION WITH INTRA OCULAR LENS RIGHT EYE;  Surgeon: Fracisco Yarbrough MD;  Location: [facility] OR    stents         Family History:    Family History   Problem Relation Age of Onset    C.A.D. Father 63        cause of death    C.A.D. Mother 78        cause of death       Social History:  Marital Status:   [2]  Social History     Tobacco Use    Smoking status: Light Smoker     Current packs/day: 0.00     Average packs/day: 0.1 packs/day for 20.0 years (2.0 ttl pk-yrs)     Types: Cigarettes     Start date: 1995     Last attempt to quit: 2015     Years since quittin.2    Smokeless tobacco: Never    Tobacco comments:     does not want quit plan. working on quitting now.   Vaping Use    Vaping status: Never Used   Substance Use Topics    Alcohol use: No     Alcohol/week: 0.0 standard drinks of alcohol    Drug use: No        Medications:    amLODIPine (NORVASC) 10 MG tablet  clopidogrel (PLAVIX) 75 MG tablet  lovastatin (MEVACOR) 20 MG tablet  metoprolol tartrate (LOPRESSOR) 50 MG tablet  traZODone (DESYREL) 50 MG tablet          Review of Systems   Constitutional:  Negative for chills and fever.   Respiratory:  Positive for cough and shortness of breath.    All other systems reviewed and are negative.      Physical Exam   BP: 163/79  Pulse: 102  Temp: 97.9  F (36.6  C)  Resp: (!) 28  Height: 157.5 cm (5' 2\")  Weight: 42.7 kg (94 lb 2.2 oz)  SpO2: (!) 90 %      Physical Exam  Constitutional:       General: She is not in acute distress.     Appearance: She is well-developed. She is not diaphoretic.   HENT:      Head: Normocephalic and atraumatic.      Right Ear: External ear normal.      Left Ear: External ear normal.      Nose: No congestion or " rhinorrhea.      Mouth/Throat:      Pharynx: Oropharynx is clear. No oropharyngeal exudate.   Eyes:      General: No scleral icterus.     Pupils: Pupils are equal, round, and reactive to light.   Cardiovascular:      Rate and Rhythm: Normal rate and regular rhythm.      Heart sounds: Normal heart sounds.   Pulmonary:      Effort: No respiratory distress.      Breath sounds: Decreased breath sounds present.   Abdominal:      General: Bowel sounds are normal.      Palpations: Abdomen is soft.      Tenderness: There is no abdominal tenderness.   Musculoskeletal:         General: No tenderness.      Cervical back: Normal range of motion and neck supple.      Right lower leg: No edema.      Left lower leg: No edema.   Skin:     General: Skin is warm.      Capillary Refill: Capillary refill takes less than 2 seconds.      Findings: No rash.   Neurological:      Mental Status: She is alert. Mental status is at baseline.      Cranial Nerves: No cranial nerve deficit.   Psychiatric:         Mood and Affect: Mood normal.         Behavior: Behavior normal.         ED Course        Procedures             Critical Care time:               Results for orders placed or performed during the hospital encounter of 11/15/24 (from the past 24 hours)   EKG 12 lead   Result Value Ref Range    Systolic Blood Pressure  mmHg    Diastolic Blood Pressure  mmHg    Ventricular Rate 100 BPM    Atrial Rate 100 BPM    NJ Interval 134 ms    QRS Duration 70 ms     ms    QTc 448 ms    P Axis 77 degrees    R AXIS 67 degrees    T Axis 57 degrees    Interpretation ECG       Sinus rhythm  Low voltage QRS  Borderline ECG  When compared with ECG of 08-Nov-2024 18:57,  Premature supraventricular complexes are no longer Present  NJ interval has increased     CBC with platelets differential    Narrative    The following orders were created for panel order CBC with platelets differential.  Procedure                               Abnormality          Status                     ---------                               -----------         ------                     CBC with platelets and d...[[id number]]  Abnormal            Final result                 Please view results for these tests on the individual orders.   Comprehensive metabolic panel   Result Value Ref Range    Sodium 132 (L) 135 - 145 mmol/L    Potassium 3.7 3.4 - 5.3 mmol/L    Carbon Dioxide (CO2) 29 22 - 29 mmol/L    Anion Gap 12 7 - 15 mmol/L    Urea Nitrogen 15.1 8.0 - 23.0 mg/dL    Creatinine 0.70 0.51 - 0.95 mg/dL    GFR Estimate 87 >60 mL/min/1.73m2    Calcium 10.2 8.8 - 10.4 mg/dL    Chloride 91 (L) 98 - 107 mmol/L    Glucose 132 (H) 70 - 99 mg/dL    Alkaline Phosphatase 141 40 - 150 U/L    AST 18 0 - 45 U/L    ALT 9 0 - 50 U/L    Protein Total 8.3 6.4 - 8.3 g/dL    Albumin 4.3 3.5 - 5.2 g/dL    Bilirubin Total 0.3 <=1.2 mg/dL   Lactic Acid Whole Blood with 1X Repeat in 2 HR when >2   Result Value Ref Range    Lactic Acid, Initial 1.6 0.7 - 2.0 mmol/L   Blood gas venous   Result Value Ref Range    pH Venous 7.40 7.32 - 7.43    pCO2 Venous 55 (H) 40 - 50 mm Hg    pO2 Venous 50 (H) 25 - 47 mm Hg    Bicarbonate Venous 34 (H) 21 - 28 mmol/L    Base Excess/Deficit Venous 7.2 (H) -3.0 - 3.0 mmol/L    FIO2 36     Oxyhemoglobin Venous 82 (H) 70 - 75 %    O2 Sat, Venous 85.2 (H) 70.0 - 75.0 %    Narrative    In healthy individuals, oxyhemoglobin (O2Hb) and oxygen saturation (SO2) are approximately equal. In the presence of dyshemoglobins, oxyhemoglobin can be considerably lower than oxygen saturation.   Procalcitonin   Result Value Ref Range    Procalcitonin 0.05 <0.50 ng/mL   [place] Draw    Narrative    The following orders were created for panel order [place] Draw.  Procedure                               Abnormality         Status                     ---------                               -----------         ------                     Extra Blue Top Tube[[id number]]                              Final  result               Extra Red Top Tube[[id number]]                               Final result               Extra Purple Top Tube[[id number]]                                                         Please view results for these tests on the individual orders.   CBC with platelets and differential   Result Value Ref Range    WBC Count 11.1 (H) 4.0 - 11.0 10e3/uL    RBC Count 4.63 3.80 - 5.20 10e6/uL    Hemoglobin 13.5 11.7 - 15.7 g/dL    Hematocrit 40.8 35.0 - 47.0 %    MCV 88 78 - 100 fL    MCH 29.2 26.5 - 33.0 pg    MCHC 33.1 31.5 - 36.5 g/dL    RDW 13.3 10.0 - 15.0 %    Platelet Count 234 150 - 450 10e3/uL    % Neutrophils 72 %    % Lymphocytes 17 %    % Monocytes 9 %    % Eosinophils 1 %    % Basophils 0 %    % Immature Granulocytes 0 %    NRBCs per 100 WBC 0 <1 /100    Absolute Neutrophils 8.1 1.6 - 8.3 10e3/uL    Absolute Lymphocytes 1.9 0.8 - 5.3 10e3/uL    Absolute Monocytes 1.0 0.0 - 1.3 10e3/uL    Absolute Eosinophils 0.1 0.0 - 0.7 10e3/uL    Absolute Basophils 0.1 0.0 - 0.2 10e3/uL    Absolute Immature Granulocytes 0.0 <=0.4 10e3/uL    Absolute NRBCs 0.0 10e3/uL   Extra Blue Top Tube   Result Value Ref Range    Hold Specimen JIC    Extra Red Top Tube   Result Value Ref Range    Hold Specimen JIC    Influenza A/B, RSV, & SARS-CoV2 PCR (COVID-19) Nasopharyngeal    Specimen: Nasopharyngeal; Swab   Result Value Ref Range    Influenza A PCR Negative Negative    Influenza B PCR Negative Negative    RSV PCR Negative Negative    SARS CoV2 PCR Negative Negative    Narrative    Testing was performed using the Xpert Xpress CoV2/Flu/RSV Assay on the [employer] GeneXpert Instrument. This test should be ordered for the detection of SARS-CoV2, influenza, and RSV viruses in individuals with signs and symptoms of respiratory tract infection. This test is for in vitro diagnostic use under the [place] FDA for laboratories certified under CLIA to perform high or moderate complexity testing. This test has been [place] FDA cleared. A  negative result does not rule out the presence of PCR inhibitors in the specimen or target RNA in concentration below the limit of detection for the assay. If only one viral target is positive but coinfection with multiple targets is suspected, the sample should be re-tested with another FDA cleared, approved, or authorized test, if coninfection would change clinical management. This test was validated by the [facility]. These laboratories are certified under the Clinical Laboratory Improvement Amendments of 1988 (CLIA-88) as qualified to perfom high complexity laboratory testing.   UA with Microscopic   Result Value Ref Range    Color Urine Light Yellow Colorless, Straw, Light Yellow, Yellow    Appearance Urine Clear Clear    Glucose Urine Negative Negative mg/dL    Bilirubin Urine Negative Negative    Ketones Urine Negative Negative mg/dL    Specific Gravity Urine 1.011 1.003 - 1.035    Blood Urine Negative Negative    pH Urine 7.5 4.7 - 8.0    Protein Albumin Urine Negative Negative mg/dL    Urobilinogen Urine Normal Normal, 2.0 mg/dL    Nitrite Urine Negative Negative    Leukocyte Esterase Urine Negative Negative    Mucus Urine Present (A) None Seen /LPF    RBC Urine <1 <=2 /HPF    WBC Urine 1 <=5 /HPF    Squamous Epithelials Urine 0 <=1 /HPF       Medications   sodium chloride (PF) 0.9% PF flush 3 mL (has no administration in time range)   sodium chloride (PF) 0.9% PF flush 3 mL (has no administration in time range)   methylPREDNISolone sodium succinate (SOLU-MEDROL) injection 40 mg (40 mg Intravenous Not Given 11/15/24 2231)   ipratropium - albuterol 0.5 mg/2.5 mg/3 mL (DUONEB) neb solution 3 mL (3 mLs Nebulization $Given 11/15/24 2206)   sodium chloride 0.9% BOLUS 1,000 mL (0 mLs Intravenous Stopped 11/16/24 0000)   magnesium sulfate 2 g in 50 mL sterile water intermittent infusion (0 g Intravenous Stopped 11/15/24 2333)       Assessments & Plan (with Medical Decision Making)     I have  reviewed the nursing notes.    I have reviewed the findings, diagnosis, plan and need for follow up with the patient.          Medical Decision Making  The patient's presentation was of moderate complexity (an acute illness with systemic symptoms).    The patient's evaluation involved:  ordering and/or review of 3+ test(s) in this encounter (see separate area of note for details)    The patient's management necessitated moderate risk (prescription drug management including medications given in the ED).    79-year-old female here with COPD exacerbation versus pneumonia versus dehydration.  Fluids, 1 [place] treated magnesium as she is not moving a lot of air and will need a few nebulizers and additional steroids.    Reassessment  Patient was asymptomatic after first liter of fluids were administered.  Was back on 3 L of oxygen at time of discharge which is her baseline.  Felt comfortable going home which I think is reasonable given she has no more symptoms.  Encouraged to continue drinking water at home.    Discharge Medication List as of 11/16/2024 12:03 AM          Final diagnoses:   Dehydration       11/15/2024   [facility]       Arslan Harrington MD  11/16/24 0219

## 2024-11-16 NOTE — ED TRIAGE NOTES
BIBA for c/o worsening SOB. Finished course of PO abx last week. Chronically on 2-3LPM O2 at home.

## 2024-11-17 LAB
ATRIAL RATE - MUSE: 100 BPM
BACTERIA UR CULT: NORMAL
DIASTOLIC BLOOD PRESSURE - MUSE: NORMAL MMHG
INTERPRETATION ECG - MUSE: NORMAL
P AXIS - MUSE: 77 DEGREES
PR INTERVAL - MUSE: 134 MS
QRS DURATION - MUSE: 70 MS
QT - MUSE: 348 MS
QTC - MUSE: 448 MS
R AXIS - MUSE: 67 DEGREES
SYSTOLIC BLOOD PRESSURE - MUSE: NORMAL MMHG
T AXIS - MUSE: 57 DEGREES
VENTRICULAR RATE- MUSE: 100 BPM

## 2024-11-21 LAB — BACTERIA BLD CULT: NO GROWTH

## 2024-11-27 ENCOUNTER — TELEPHONE (OUTPATIENT)
Dept: FAMILY MEDICINE | Facility: OTHER | Age: 79
End: 2024-11-27

## 2024-11-27 DIAGNOSIS — J44.9 CHRONIC OBSTRUCTIVE PULMONARY DISEASE, UNSPECIFIED COPD TYPE (H): Primary | ICD-10-CM

## 2024-11-27 NOTE — TELEPHONE ENCOUNTER
1:59 PM    Reason for Call: Phone Call    Description: Sheila went to [facility] [place] for a new tank refill and they need an update with O2 needs with Dr Ruiz and Dr Ruiz to order for portable concentrator    Phone [phone]  Fax [phone]    Was an appointment offered for this call? No  If yes : Appointment type              Date    Preferred method for responding to this message: Telephone Call  What is your phone number ? [phone]    If we cannot reach you directly, may we leave a detailed response at the number you provided? Yes    Can this message wait until your PCP/provider returns, if available today? YES, No

## 2024-12-17 ENCOUNTER — HOSPITAL ENCOUNTER (EMERGENCY)
Facility: HOSPITAL | Age: 79
Discharge: OTHER INSTITUTION | End: 2024-12-17
Attending: EMERGENCY MEDICINE
Payer: COMMERCIAL

## 2024-12-17 ENCOUNTER — APPOINTMENT (OUTPATIENT)
Dept: CT IMAGING | Facility: HOSPITAL | Age: 79
End: 2024-12-17
Attending: EMERGENCY MEDICINE
Payer: COMMERCIAL

## 2024-12-17 VITALS
DIASTOLIC BLOOD PRESSURE: 52 MMHG | BODY MASS INDEX: 18.01 KG/M2 | RESPIRATION RATE: 21 BRPM | HEART RATE: 95 BPM | WEIGHT: 97.88 LBS | SYSTOLIC BLOOD PRESSURE: 98 MMHG | HEIGHT: 62 IN | OXYGEN SATURATION: 97 %

## 2024-12-17 DIAGNOSIS — I71.33 RUPTURED INFRARENAL ABDOMINAL AORTIC ANEURYSM (AAA) (H): ICD-10-CM

## 2024-12-17 LAB
ABO + RH BLD: NORMAL
ALBUMIN SERPL BCG-MCNC: 3.8 G/DL (ref 3.5–5.2)
ALP SERPL-CCNC: 131 U/L (ref 40–150)
ALT SERPL W P-5'-P-CCNC: 9 U/L (ref 0–50)
ANION GAP SERPL CALCULATED.3IONS-SCNC: 16 MMOL/L (ref 7–15)
AST SERPL W P-5'-P-CCNC: 19 U/L (ref 0–45)
BASOPHILS # BLD AUTO: 0.1 10E3/UL (ref 0–0.2)
BASOPHILS NFR BLD AUTO: 0 %
BILIRUB SERPL-MCNC: 0.3 MG/DL
BLD GP AB SCN SERPL QL: NEGATIVE
BLD PROD TYP BPU: NORMAL
BLOOD COMPONENT TYPE: NORMAL
BUN SERPL-MCNC: 15.9 MG/DL (ref 8–23)
CALCIUM SERPL-MCNC: 9.1 MG/DL (ref 8.8–10.4)
CHLORIDE SERPL-SCNC: 94 MMOL/L (ref 98–107)
CODING SYSTEM: NORMAL
CREAT SERPL-MCNC: 1.02 MG/DL (ref 0.51–0.95)
CROSSMATCH: NORMAL
EGFRCR SERPLBLD CKD-EPI 2021: 56 ML/MIN/1.73M2
EOSINOPHIL # BLD AUTO: 0.2 10E3/UL (ref 0–0.7)
EOSINOPHIL NFR BLD AUTO: 1 %
ERYTHROCYTE [DISTWIDTH] IN BLOOD BY AUTOMATED COUNT: 13.4 % (ref 10–15)
GLUCOSE SERPL-MCNC: 338 MG/DL (ref 70–99)
HCO3 SERPL-SCNC: 26 MMOL/L (ref 22–29)
HCT VFR BLD AUTO: 36.5 % (ref 35–47)
HGB BLD-MCNC: 11.6 G/DL (ref 11.7–15.7)
IMM GRANULOCYTES # BLD: 0.3 10E3/UL
IMM GRANULOCYTES NFR BLD: 1 %
INR PPP: 1.12 (ref 0.85–1.15)
LACTATE SERPL-SCNC: 4.8 MMOL/L (ref 0.7–2)
LACTATE SERPL-SCNC: 5.7 MMOL/L (ref 0.7–2)
LIPASE SERPL-CCNC: 49 U/L (ref 13–60)
LYMPHOCYTES # BLD AUTO: 3.6 10E3/UL (ref 0.8–5.3)
LYMPHOCYTES NFR BLD AUTO: 14 %
MCH RBC QN AUTO: 29 PG (ref 26.5–33)
MCHC RBC AUTO-ENTMCNC: 31.8 G/DL (ref 31.5–36.5)
MCV RBC AUTO: 91 FL (ref 78–100)
MONOCYTES # BLD AUTO: 1 10E3/UL (ref 0–1.3)
MONOCYTES NFR BLD AUTO: 4 %
NEUTROPHILS # BLD AUTO: 21.3 10E3/UL (ref 1.6–8.3)
NEUTROPHILS NFR BLD AUTO: 80 %
NRBC # BLD AUTO: 0 10E3/UL
NRBC BLD AUTO-RTO: 0 /100
PLATELET # BLD AUTO: 264 10E3/UL (ref 150–450)
POTASSIUM SERPL-SCNC: 3.3 MMOL/L (ref 3.4–5.3)
PROT SERPL-MCNC: 6.9 G/DL (ref 6.4–8.3)
RADIOLOGIST FLAGS: ABNORMAL
RBC # BLD AUTO: 4 10E6/UL (ref 3.8–5.2)
SODIUM SERPL-SCNC: 136 MMOL/L (ref 135–145)
SPECIMEN EXP DATE BLD: NORMAL
UNIT ABO/RH: NORMAL
UNIT NUMBER: NORMAL
UNIT STATUS: NORMAL
UNIT TYPE ISBT: 9500
WBC # BLD AUTO: 26.6 10E3/UL (ref 4–11)

## 2024-12-17 PROCEDURE — 36415 COLL VENOUS BLD VENIPUNCTURE: CPT | Performed by: EMERGENCY MEDICINE

## 2024-12-17 PROCEDURE — 82310 ASSAY OF CALCIUM: CPT | Performed by: EMERGENCY MEDICINE

## 2024-12-17 PROCEDURE — 85025 COMPLETE CBC W/AUTO DIFF WBC: CPT | Performed by: EMERGENCY MEDICINE

## 2024-12-17 PROCEDURE — 86900 BLOOD TYPING SEROLOGIC ABO: CPT | Performed by: EMERGENCY MEDICINE

## 2024-12-17 PROCEDURE — 258N000003 HC RX IP 258 OP 636: Performed by: EMERGENCY MEDICINE

## 2024-12-17 PROCEDURE — 83605 ASSAY OF LACTIC ACID: CPT | Performed by: EMERGENCY MEDICINE

## 2024-12-17 PROCEDURE — P9016 RBC LEUKOCYTES REDUCED: HCPCS | Performed by: EMERGENCY MEDICINE

## 2024-12-17 PROCEDURE — 36430 TRANSFUSION BLD/BLD COMPNT: CPT

## 2024-12-17 PROCEDURE — 71260 CT THORAX DX C+: CPT

## 2024-12-17 PROCEDURE — 96374 THER/PROPH/DIAG INJ IV PUSH: CPT | Mod: XU

## 2024-12-17 PROCEDURE — 96361 HYDRATE IV INFUSION ADD-ON: CPT

## 2024-12-17 PROCEDURE — 86923 COMPATIBILITY TEST ELECTRIC: CPT | Performed by: EMERGENCY MEDICINE

## 2024-12-17 PROCEDURE — 99291 CRITICAL CARE FIRST HOUR: CPT | Mod: 25

## 2024-12-17 PROCEDURE — 82565 ASSAY OF CREATININE: CPT | Performed by: EMERGENCY MEDICINE

## 2024-12-17 PROCEDURE — 250N000011 HC RX IP 250 OP 636: Performed by: EMERGENCY MEDICINE

## 2024-12-17 PROCEDURE — 99291 CRITICAL CARE FIRST HOUR: CPT | Performed by: EMERGENCY MEDICINE

## 2024-12-17 PROCEDURE — 86920 COMPATIBILITY TEST SPIN: CPT | Performed by: EMERGENCY MEDICINE

## 2024-12-17 PROCEDURE — 85610 PROTHROMBIN TIME: CPT | Performed by: EMERGENCY MEDICINE

## 2024-12-17 PROCEDURE — 83690 ASSAY OF LIPASE: CPT | Performed by: EMERGENCY MEDICINE

## 2024-12-17 RX ORDER — IOPAMIDOL 755 MG/ML
48 INJECTION, SOLUTION INTRAVASCULAR ONCE
Status: DISCONTINUED | OUTPATIENT
Start: 2024-12-17 | End: 2024-12-17 | Stop reason: HOSPADM

## 2024-12-17 RX ORDER — IOPAMIDOL 755 MG/ML
48 INJECTION, SOLUTION INTRAVASCULAR ONCE
Status: COMPLETED | OUTPATIENT
Start: 2024-12-17 | End: 2024-12-17

## 2024-12-17 RX ORDER — ESMOLOL HYDROCHLORIDE 20 MG/ML
50-200 INJECTION, SOLUTION INTRAVENOUS CONTINUOUS
Status: DISCONTINUED | OUTPATIENT
Start: 2024-12-17 | End: 2024-12-17 | Stop reason: HOSPADM

## 2024-12-17 RX ORDER — KETOROLAC TROMETHAMINE 15 MG/ML
15 INJECTION, SOLUTION INTRAMUSCULAR; INTRAVENOUS ONCE
Status: COMPLETED | OUTPATIENT
Start: 2024-12-17 | End: 2024-12-17

## 2024-12-17 RX ADMIN — IOPAMIDOL 48 ML: 755 INJECTION, SOLUTION INTRAVENOUS at 02:19

## 2024-12-17 RX ADMIN — SODIUM CHLORIDE 1000 ML: 9 INJECTION, SOLUTION INTRAVENOUS at 01:30

## 2024-12-17 RX ADMIN — KETOROLAC TROMETHAMINE 15 MG: 15 INJECTION, SOLUTION INTRAMUSCULAR; INTRAVENOUS at 01:31

## 2024-12-17 ASSESSMENT — ACTIVITIES OF DAILY LIVING (ADL)
ADLS_ACUITY_SCORE: 41

## 2024-12-17 ASSESSMENT — ENCOUNTER SYMPTOMS
FEVER: 0
COUGH: 1
CHILLS: 0
SHORTNESS OF BREATH: 1

## 2024-12-17 NOTE — ED PROVIDER NOTES
History     Chief Complaint   Patient presents with    Abdominal Pain    Shortness of Breath     HPI  Sheila Nuñez is a 79 year old female who is here with difficulty breathing.  Chronically short of breath but worse recently.  Cough is the same and normally is.  When asked what she means by difficulty breathing, she is breathing too quickly.  Also with abdominal pain.  Right lower quadrant.  Intermittent.  Frequent bowel movements no vomiting.    Allergies:  Allergies   Allergen Reactions    Ciprofloxacin Other (See Comments)     Cipro  intolerance    Influenza Vaccines      Influenza virus vacc. Specific      Morphine Other (See Comments)     Becomes very mean    Nitrofurantoin Other (See Comments)     Macrobid  intolerance    Nitrofurantoin Macrocrystal Other (See Comments)     Macrobid  Intolerance         Problem List:    Patient Active Problem List    Diagnosis Date Noted    Protein-calorie malnutrition (H) 11/23/2020     Priority: Medium    COPD exacerbation (H) 06/07/2017     Priority: Medium    ACP (advance care planning) 01/25/2017     Priority: Medium     Advance Care Planning 1/25/2017: ACP Review of Chart / Resources Provided:  Reviewed chart for advance care plan.  Sheila Nuñez has been provided information and resources to begin or update their advance care plan.  Added by Celia Montenegro            COPD (chronic obstructive pulmonary disease) (H) 05/28/2013     Priority: Medium    Dyslipidemia 05/28/2013     Priority: Medium    Asthma, moderate persistent 01/03/2012     Priority: Medium    CHD (coronary heart disease) 07/27/2011     Priority: Medium    Tobacco abuse 07/27/2011     Priority: Medium        Past Medical History:    Past Medical History:   Diagnosis Date    Asthma 1/3/2012    CHD (coronary heart disease) 7/27/2011    Dyslipidemia 10/06/2011    Tobacco abuse 7/27/2011       Past Surgical History:    Past Surgical History:   Procedure Laterality Date    ANGIOGRAM       "PHACOEMULSIFICATION WITH STANDARD INTRAOCULAR LENS IMPLANT Left 2021    Procedure: Phacoemulsification of cataract with intraocular lens placement, left;  Surgeon: Fracisco Yarbrough MD;  Location: [facility]    PHACOEMULSIFICATION WITH STANDARD INTRAOCULAR LENS IMPLANT Right 2021    Procedure: CATARACT EXTRACTION WITH INTRA OCULAR LENS RIGHT EYE;  Surgeon: Fracisco Yarbrough MD;  Location: [facility] OR    stents         Family History:    Family History   Problem Relation Age of Onset    C.A.D. Father 63        cause of death    C.A.D. Mother 78        cause of death       Social History:  Marital Status:   [2]  Social History     Tobacco Use    Smoking status: Light Smoker     Current packs/day: 0.00     Average packs/day: 0.1 packs/day for 20.0 years (2.0 ttl pk-yrs)     Types: Cigarettes     Start date: 1995     Last attempt to quit: 2015     Years since quittin.3    Smokeless tobacco: Never    Tobacco comments:     does not want quit plan. working on quitting now.   Vaping Use    Vaping status: Never Used   Substance Use Topics    Alcohol use: No     Alcohol/week: 0.0 standard drinks of alcohol    Drug use: No        Medications:    amLODIPine (NORVASC) 10 MG tablet  clopidogrel (PLAVIX) 75 MG tablet  lovastatin (MEVACOR) 20 MG tablet  metoprolol tartrate (LOPRESSOR) 50 MG tablet  traZODone (DESYREL) 50 MG tablet          Review of Systems   Constitutional:  Negative for chills and fever.   Respiratory:  Positive for cough and shortness of breath.        Physical Exam   BP: 109/64  Pulse: 92  Temp: (P) 97.4  F (36.3  C)  Resp: 24  Height: 157.5 cm (5' 2\")  Weight: 44.4 kg (97 lb 14.2 oz)  SpO2: 94 %      Physical Exam  Constitutional:       General: She is not in acute distress.     Appearance: She is not diaphoretic.   HENT:      Head: Normocephalic and atraumatic.      Right Ear: External ear normal.      Left Ear: External ear normal.      Nose: No congestion or rhinorrhea.      " Mouth/Throat:      Pharynx: Oropharynx is clear. No oropharyngeal exudate.   Eyes:      General: No scleral icterus.     Pupils: Pupils are equal, round, and reactive to light.   Cardiovascular:      Rate and Rhythm: Normal rate and regular rhythm.      Heart sounds: Normal heart sounds.   Pulmonary:      Effort: No respiratory distress.      Breath sounds: Normal breath sounds.      Comments: No tachypnea, decreased breath sounds throughout  Abdominal:      Comments: Would not allow for abdominal exam, appears mildly distended   Musculoskeletal:         General: No tenderness.      Cervical back: Normal range of motion and neck supple.      Right lower leg: No edema.      Left lower leg: No edema.   Skin:     General: Skin is warm.      Capillary Refill: Capillary refill takes less than 2 seconds.      Findings: No rash.   Neurological:      Mental Status: Mental status is at baseline.      Cranial Nerves: No cranial nerve deficit.   Psychiatric:         Mood and Affect: Mood normal.         Behavior: Behavior normal.         ED Course     ED Course as of 12/17/24 0422   Tue Dec 17, 2024   0250 Rads = AAA infra-renal 5.9cm, lots of blood preducts retroperitoneal, contained ruptured AAA, no active extravasation   0250 Infrarenal down to a   0251 Just abvove bifurcation, 6.5cm in craniocaudal direction   0311 Vascular Henry mckenna   0320 Jorgenson called back, aware of case, reviewing images     Procedures             Critical Care time:  was 69 minutes for this patient excluding procedures.    IV Antibiotics given and/or elevated Lactate of 5.7 and no sepsis note found - Delete this reminder and enter the sepsis note or '.edcms' before signing chart.>>>         Results for orders placed or performed during the hospital encounter of 12/17/24 (from the past 24 hours)   CBC with platelets differential    Narrative    The following orders were created for panel order CBC with platelets differential.  Procedure                                Abnormality         Status                     ---------                               -----------         ------                     CBC with platelets and d...[[id number]]  Abnormal            Final result                 Please view results for these tests on the individual orders.   INR   Result Value Ref Range    INR 1.12 0.85 - 1.15   Comprehensive metabolic panel   Result Value Ref Range    Sodium 136 135 - 145 mmol/L    Potassium 3.3 (L) 3.4 - 5.3 mmol/L    Carbon Dioxide (CO2) 26 22 - 29 mmol/L    Anion Gap 16 (H) 7 - 15 mmol/L    Urea Nitrogen 15.9 8.0 - 23.0 mg/dL    Creatinine 1.02 (H) 0.51 - 0.95 mg/dL    GFR Estimate 56 (L) >60 mL/min/1.73m2    Calcium 9.1 8.8 - 10.4 mg/dL    Chloride 94 (L) 98 - 107 mmol/L    Glucose 338 (H) 70 - 99 mg/dL    Alkaline Phosphatase 131 40 - 150 U/L    AST 19 0 - 45 U/L    ALT 9 0 - 50 U/L    Protein Total 6.9 6.4 - 8.3 g/dL    Albumin 3.8 3.5 - 5.2 g/dL    Bilirubin Total 0.3 <=1.2 mg/dL   Lipase   Result Value Ref Range    Lipase 49 13 - 60 U/L   Lactic acid whole blood with 1x repeat in 2 hr when >2   Result Value Ref Range    Lactic Acid, Initial 5.7 (HH) 0.7 - 2.0 mmol/L   CBC with platelets and differential   Result Value Ref Range    WBC Count 26.6 (H) 4.0 - 11.0 10e3/uL    RBC Count 4.00 3.80 - 5.20 10e6/uL    Hemoglobin 11.6 (L) 11.7 - 15.7 g/dL    Hematocrit 36.5 35.0 - 47.0 %    MCV 91 78 - 100 fL    MCH 29.0 26.5 - 33.0 pg    MCHC 31.8 31.5 - 36.5 g/dL    RDW 13.4 10.0 - 15.0 %    Platelet Count 264 150 - 450 10e3/uL    % Neutrophils 80 %    % Lymphocytes 14 %    % Monocytes 4 %    % Eosinophils 1 %    % Basophils 0 %    % Immature Granulocytes 1 %    NRBCs per 100 WBC 0 <1 /100    Absolute Neutrophils 21.3 (H) 1.6 - 8.3 10e3/uL    Absolute Lymphocytes 3.6 0.8 - 5.3 10e3/uL    Absolute Monocytes 1.0 0.0 - 1.3 10e3/uL    Absolute Eosinophils 0.2 0.0 - 0.7 10e3/uL    Absolute Basophils 0.1 0.0 - 0.2 10e3/uL    Absolute Immature  Granulocytes 0.3 <=0.4 10e3/uL    Absolute NRBCs 0.0 10e3/uL   ABO/Rh type and screen    Narrative    The following orders were created for panel order ABO/Rh type and screen.  Procedure                               Abnormality         Status                     ---------                               -----------         ------                     Adult Type and Screen[[id number]]                            In process                   Please view results for these tests on the individual orders.   CT Chest/Abdomen/Pelvis w Contrast   Result Value Ref Range    Radiologist flags Active arterial bleeding or acute aortic pathology (AA)     Narrative    EXAM: CT CHEST/ABDOMEN/PELVIS W CONTRAST  LOCATION: [facility]  DATE: 12/17/2024    INDICATION: sob, abd pain rlq  COMPARISON: None.  TECHNIQUE: CT scan of the chest, abdomen, and pelvis was performed following injection of IV contrast. Multiplanar reformats were obtained. Dose reduction techniques were used.   CONTRAST: Isovue 370 48ml    FINDINGS:     LUNGS AND PLEURA: Emphysematous changes of the lungs. Fibrotic changes at the lung bases. Diffuse airway thickening. No significant pleural fluid.    MEDIASTINUM/AXILLAE: Multinodular thyroid goiter. Diffuse atherosclerotic calcifications of the aortic arch and descending thoracic aorta. No central pulmonary artery embolus.    CORONARY ARTERY CALCIFICATION: Severe atherosclerotic coronary artery calcifications and/or stents.    HEPATOBILIARY: Benign hepatic cysts which require no dedicated follow-up. Unremarkable gallbladder.    PANCREAS: Slightly atrophic.    SPLEEN: Normal.    ADRENAL GLANDS: Normal.    KIDNEYS/BLADDER: Atrophic left kidney. Hypertrophied right kidney. Small renal cysts which require no dedicated follow-up. No hydronephrosis. Decompressed urinary bladder.    BOWEL: Colonic diverticulosis. No bowel obstruction. Trace free fluid in the dependent pelvis.    LYMPH NODES: Normal.    VASCULATURE:  Infrarenal AAA measuring 5.9 x 5.2 cm (series 10 image 93). Significant periaortic and retroperitoneal blood products, compatible with ruptured AAA. No significant extravasation of intravenous contrast at this time. Diffuse atherosclerotic   calcifications of the aorta and its branches.    PELVIC ORGANS: Hysterectomy.    MUSCULOSKELETAL: Normal.      Impression    IMPRESSION:    1.  Infrarenal AAA measuring 5.9 x 5.2 cm. Significant periaortic and retroperitoneal blood products, compatible with ruptured AAA. No significant extravasation of intravenous contrast at this time. Recommend Vascular Surgery consult.  2.  Emphysematous changes of the lungs.  3.  Severe atherosclerotic coronary artery calcifications and/or stents.  4.  Multinodular thyroid goiter. Thyroid ultrasound can be considered for further evaluation, once clinically feasible.      [Critical Result: Active arterial bleeding or acute aortic pathology]    Finding was identified on 12/17/2024 2:42 AM CST.     Dr. Harrington was contacted by me on 12/17/2024 2:49 AM CST and verbalized understanding of the critical result.      Lactic acid whole blood   Result Value Ref Range    Lactic Acid 4.8 (HH) 0.7 - 2.0 mmol/L       Medications   iopamidol (ISOVUE-370) solution 48 mL (has no administration in time range)   sodium chloride (PF) 0.9% PF flush 50 mL (has no administration in time range)   esmolol 20 mg/mL (BREVIBLOC) infusion 100 mL ( Intravenous Handoff 12/17/24 0351)   iopamidol (ISOVUE-370) solution 48 mL (48 mLs Intravenous $Given 12/17/24 0219)   sodium chloride (PF) 0.9% PF flush 50 mL (50 mLs Intravenous $Given 12/17/24 0218)   sodium chloride 0.9% BOLUS 1,000 mL (0 mLs Intravenous Stopped 12/17/24 0244)   ketorolac (TORADOL) injection 15 mg (15 mg Intravenous $Given 12/17/24 0131)       Assessments & Plan (with Medical Decision Making)     I have reviewed the nursing notes.    I have reviewed the findings, diagnosis, plan and need for follow up with  the patient.  Critical Care Addendum  My initial assessment, based on my review of prehospital provider report, review of nursing observations, review of vital signs, focused history, physical exam, and review of cardiac rhythm monitor, established a high suspicion that Sheila Nuñez has ruptured abdominal aortic aneurysm, which requires immediate intervention, and therefore she is critically ill.     After the initial assessment, the care team initiated multiple lab tests, initiated IV fluid administration, initiated medication therapy with prbc, esmolol, and consulted with dr mckenna (vascular, [facility])  to provide stabilization care. Due to the critical nature of this patient, I reassessed nursing observations, vital signs, physical exam, review of cardiac rhythm monitor, mental status, neurologic status, and respiratory status multiple times prior to her disposition.     Time also spent performing documentation, discussion with family to obtain medical information for decision making, reviewing test results, discussion with consultants, and coordination of care.     Critical care time (excluding teaching time and procedures): 69 minutes.           Medical Decision Making  The patient's presentation was of high complexity (an acute health issue posing potential threat to life or bodily function).    The patient's evaluation involved:  an assessment requiring an independent historian (daughter)  ordering and/or review of 3+ test(s) in this encounter (see separate area of note for details)    The patient's management necessitated high risk (a parenteral controlled substance).    79-year-old female here with difficulty breathing and abdominal pain.  Will obtain CT chest abdomen pelvis, I suspect breathing issues are related to her chronic smoking in context of severe COPD.  Would not allow me to examine her abdomen so we will obtain CT scan to further elucidate etiology of her pain.    Reassessment  I  ordered CT chest abdomen pelvis to determine if there is anything pulmonary aside from COPD causing her difficulty breathing, her other complaint was abdominal pain so included on abdomen pelvis.    Report came back that patient likely has a ruptured AAA.  At that point, immediately sent images to [facility], discussed case with Dr. Malone at [facility] who graciously accepted the patient.  Her plan was to send her over with 1 unit of PRBC and would give them if her systolic to drop below 80 or if she became severely altered.  I also sent crew with esmolol, only giving if her systolic was above 120.  I informed patient, her spouse and her daughter, that she is in grave condition and may not survive to transfer, they were aware.    New Prescriptions    No medications on file       Final diagnoses:   Ruptured infrarenal abdominal aortic aneurysm (AAA) (H)       12/17/2024   [facility]       Arslan Harrington MD  12/17/24 1445

## 2024-12-17 NOTE — ED TRIAGE NOTES
"Patient presents to ED via [facility] from home with c/o abdominal pain \"all over started at 0300 when getting up to BR and c/o sob\". Last BM yesterday, currently on bedpan. Denies N/V/D. Abdominal pain \"8/10 sharp\". Per EMS neb given en-route for c/o SOB, per patient and EMS neb helpful. Denies chest pain.         "

## 2024-12-17 NOTE — ED NOTES
Patient transferred to [facility] ER via [facility]. Patient  and daughter at bedside upon transfer. 1 unit of blood spiked and primed at 0350 and handed off to EMS, blood NOT infusing upon transfer just given to EMS with specific instructions per MD when to initiate if needed while en route to [place]. Also Esmolol drip unopened sent with EMS with specific instructions per MD given to EMS upon handoff for when to initiate only if needed (see MAR). Nurse to nurse given to TACHO Davis. Stat doc notified by MD.

## 2024-12-18 LAB
BLD PROD TYP BPU: NORMAL
BLOOD COMPONENT TYPE: NORMAL
CODING SYSTEM: NORMAL
CROSSMATCH: NORMAL
ISSUE DATE AND TIME: NORMAL
UNIT ABO/RH: NORMAL
UNIT NUMBER: NORMAL
UNIT STATUS: NORMAL
UNIT TYPE ISBT: 9500

## (undated) DEVICE — HANDPIECE-CAPSULEGUARD I/A STELLARIS

## (undated) DEVICE — CHANG NUCLEUS HYDRODISSECTOR BEVEL ANGLE 47 DEGREE .40 X 18MM

## (undated) DEVICE — CANNULA-VISCOFLOW 25G 9MM 45 DEGREE ANGLE

## (undated) DEVICE — KNIFE-X STAR SLIT 2.4MM

## (undated) DEVICE — BIN-LENS IMPLANT CART

## (undated) DEVICE — PHACO ACCESSORY PACK 2.2MM

## (undated) DEVICE — BIN-TECNIS DCB00 LENSES

## (undated) DEVICE — BETADINE 5% STERILE OPHTHALMIC SOLUTION 1 OZ.

## (undated) DEVICE — GLV-7.0 PROTEXIS PI CLASSIC LF/PF

## (undated) DEVICE — INSTRUMENT WIPE-VISIWIPE

## (undated) DEVICE — BIN-CATARACT BIN

## (undated) DEVICE — GLV-7.5 PROTEXIS PI CLASSIC LF/PF

## (undated) DEVICE — KNIFE-MVR 20GA/45 DEGREE ANGLED

## (undated) DEVICE — DRSG-TEGADERM MEDIUM #1626

## (undated) DEVICE — PACK-EYE-CUSTOM

## (undated) DEVICE — PHACO NEEDLE-MICS 2.0 STRAIGHT

## (undated) DEVICE — CYSTOTOME-IRRIGATING  25G

## (undated) DEVICE — CANNULA-VISCOFLOW 30G 9MM BEND

## (undated) DEVICE — PACK-PHACO STELLARIS